# Patient Record
Sex: MALE | Race: WHITE | Employment: OTHER | URBAN - NONMETROPOLITAN AREA
[De-identification: names, ages, dates, MRNs, and addresses within clinical notes are randomized per-mention and may not be internally consistent; named-entity substitution may affect disease eponyms.]

---

## 2017-01-13 DIAGNOSIS — Z76.0 MEDICATION REFILL: Primary | ICD-10-CM

## 2017-01-13 RX ORDER — OMEPRAZOLE 40 MG/1
40 CAPSULE, DELAYED RELEASE ORAL 2 TIMES DAILY
Qty: 180 CAPSULE | Refills: 3 | Status: SHIPPED | OUTPATIENT
Start: 2017-01-13 | End: 2018-04-18 | Stop reason: SDUPTHER

## 2017-01-13 RX ORDER — TIZANIDINE 4 MG/1
4 TABLET ORAL 2 TIMES DAILY
Qty: 180 TABLET | Refills: 3 | Status: SHIPPED | OUTPATIENT
Start: 2017-01-13 | End: 2018-06-12 | Stop reason: SDUPTHER

## 2017-02-16 ENCOUNTER — OFFICE VISIT (OUTPATIENT)
Dept: PRIMARY CARE CLINIC | Age: 57
End: 2017-02-16
Payer: COMMERCIAL

## 2017-02-16 VITALS
OXYGEN SATURATION: 98 % | BODY MASS INDEX: 33.03 KG/M2 | SYSTOLIC BLOOD PRESSURE: 130 MMHG | DIASTOLIC BLOOD PRESSURE: 86 MMHG | HEIGHT: 69 IN | HEART RATE: 72 BPM | RESPIRATION RATE: 16 BRPM | WEIGHT: 223 LBS

## 2017-02-16 DIAGNOSIS — J40 BRONCHITIS: Primary | ICD-10-CM

## 2017-02-16 PROCEDURE — 99213 OFFICE O/P EST LOW 20 MIN: CPT | Performed by: FAMILY MEDICINE

## 2017-02-16 PROCEDURE — 96372 THER/PROPH/DIAG INJ SC/IM: CPT | Performed by: FAMILY MEDICINE

## 2017-02-16 RX ORDER — ALBUTEROL SULFATE 0.63 MG/3ML
1 SOLUTION RESPIRATORY (INHALATION) EVERY 6 HOURS PRN
COMMUNITY
End: 2017-02-16 | Stop reason: ALTCHOICE

## 2017-02-16 RX ORDER — GABAPENTIN 600 MG/1
600 TABLET ORAL 3 TIMES DAILY
Qty: 270 TABLET | Refills: 0 | Status: CANCELLED | OUTPATIENT
Start: 2017-02-16

## 2017-02-16 RX ORDER — PREDNISONE 20 MG/1
40 TABLET ORAL DAILY
Qty: 10 TABLET | Refills: 0 | Status: SHIPPED | OUTPATIENT
Start: 2017-02-16 | End: 2017-02-21

## 2017-02-16 RX ORDER — METHYLPREDNISOLONE SODIUM SUCCINATE 40 MG/ML
40 INJECTION, POWDER, LYOPHILIZED, FOR SOLUTION INTRAMUSCULAR; INTRAVENOUS ONCE
Status: COMPLETED | OUTPATIENT
Start: 2017-02-16 | End: 2017-02-16

## 2017-02-16 RX ORDER — IPRATROPIUM BROMIDE AND ALBUTEROL SULFATE 2.5; .5 MG/3ML; MG/3ML
1 SOLUTION RESPIRATORY (INHALATION) ONCE
Status: COMPLETED | OUTPATIENT
Start: 2017-02-16 | End: 2017-02-16

## 2017-02-16 RX ORDER — ALBUTEROL SULFATE 90 UG/1
2 AEROSOL, METERED RESPIRATORY (INHALATION) EVERY 4 HOURS PRN
Qty: 1 INHALER | Refills: 3 | Status: SHIPPED | OUTPATIENT
Start: 2017-02-16 | End: 2018-03-15

## 2017-02-16 RX ORDER — DOXYCYCLINE HYCLATE 100 MG
100 TABLET ORAL 2 TIMES DAILY
Qty: 20 TABLET | Refills: 0 | Status: SHIPPED | OUTPATIENT
Start: 2017-02-16 | End: 2017-02-26

## 2017-02-16 RX ADMIN — IPRATROPIUM BROMIDE AND ALBUTEROL SULFATE 1 AMPULE: 2.5; .5 SOLUTION RESPIRATORY (INHALATION) at 16:26

## 2017-02-16 RX ADMIN — METHYLPREDNISOLONE SODIUM SUCCINATE 40 MG: 40 INJECTION, POWDER, LYOPHILIZED, FOR SOLUTION INTRAMUSCULAR; INTRAVENOUS at 16:24

## 2017-02-16 ASSESSMENT — ENCOUNTER SYMPTOMS
COUGH: 1
SORE THROAT: 1
SHORTNESS OF BREATH: 1
WHEEZING: 1

## 2017-03-01 ENCOUNTER — OFFICE VISIT (OUTPATIENT)
Dept: PRIMARY CARE CLINIC | Age: 57
End: 2017-03-01
Payer: COMMERCIAL

## 2017-03-01 VITALS
DIASTOLIC BLOOD PRESSURE: 80 MMHG | WEIGHT: 223.38 LBS | HEIGHT: 69 IN | RESPIRATION RATE: 16 BRPM | SYSTOLIC BLOOD PRESSURE: 142 MMHG | OXYGEN SATURATION: 92 % | BODY MASS INDEX: 33.08 KG/M2 | HEART RATE: 92 BPM

## 2017-03-01 DIAGNOSIS — B37.0 THRUSH, ORAL: ICD-10-CM

## 2017-03-01 DIAGNOSIS — L50.9 HIVES: Primary | ICD-10-CM

## 2017-03-01 PROCEDURE — 99213 OFFICE O/P EST LOW 20 MIN: CPT | Performed by: FAMILY MEDICINE

## 2017-03-01 RX ORDER — MONTELUKAST SODIUM 10 MG/1
10 TABLET ORAL DAILY
Qty: 30 TABLET | Refills: 3 | Status: SHIPPED | OUTPATIENT
Start: 2017-03-01 | End: 2017-07-03 | Stop reason: SDUPTHER

## 2017-03-01 ASSESSMENT — ENCOUNTER SYMPTOMS
COUGH: 0
SINUS PRESSURE: 0
SHORTNESS OF BREATH: 0

## 2017-03-14 DIAGNOSIS — B37.0 THRUSH, ORAL: ICD-10-CM

## 2017-05-05 ENCOUNTER — EMPLOYEE WELLNESS (OUTPATIENT)
Dept: OTHER | Age: 57
End: 2017-05-05

## 2017-05-05 LAB
CHOLESTEROL, TOTAL: 168 MG/DL (ref 160–199)
GLUCOSE BLD-MCNC: 84 MG/DL (ref 74–109)
HDLC SERPL-MCNC: 38 MG/DL (ref 55–121)
LDL CHOLESTEROL CALCULATED: 101 MG/DL
TRIGL SERPL-MCNC: 145 MG/DL (ref 150–199)

## 2017-07-03 ENCOUNTER — OFFICE VISIT (OUTPATIENT)
Dept: PRIMARY CARE CLINIC | Age: 57
End: 2017-07-03
Payer: COMMERCIAL

## 2017-07-03 VITALS
BODY MASS INDEX: 32.44 KG/M2 | HEART RATE: 72 BPM | DIASTOLIC BLOOD PRESSURE: 80 MMHG | HEIGHT: 69 IN | SYSTOLIC BLOOD PRESSURE: 150 MMHG | TEMPERATURE: 98.6 F | WEIGHT: 219 LBS | OXYGEN SATURATION: 98 %

## 2017-07-03 DIAGNOSIS — M54.12 CERVICAL RADICULOPATHY: ICD-10-CM

## 2017-07-03 DIAGNOSIS — M50.30 DDD (DEGENERATIVE DISC DISEASE), CERVICAL: ICD-10-CM

## 2017-07-03 DIAGNOSIS — L50.9 HIVES: ICD-10-CM

## 2017-07-03 DIAGNOSIS — M51.36 DDD (DEGENERATIVE DISC DISEASE), LUMBAR: ICD-10-CM

## 2017-07-03 DIAGNOSIS — I10 ESSENTIAL HYPERTENSION: Primary | ICD-10-CM

## 2017-07-03 PROCEDURE — 99214 OFFICE O/P EST MOD 30 MIN: CPT | Performed by: FAMILY MEDICINE

## 2017-07-03 RX ORDER — GABAPENTIN 600 MG/1
600 TABLET ORAL 3 TIMES DAILY
Qty: 270 TABLET | Refills: 0 | Status: SHIPPED | OUTPATIENT
Start: 2017-07-03 | End: 2018-02-20 | Stop reason: SDUPTHER

## 2017-07-03 RX ORDER — MONTELUKAST SODIUM 10 MG/1
10 TABLET ORAL DAILY
Qty: 90 TABLET | Refills: 3 | Status: SHIPPED | OUTPATIENT
Start: 2017-07-03 | End: 2018-07-18 | Stop reason: SDUPTHER

## 2017-07-03 RX ORDER — GABAPENTIN 600 MG/1
TABLET ORAL
Qty: 90 TABLET | Refills: 0 | OUTPATIENT
Start: 2017-07-03

## 2017-07-03 RX ORDER — LISINOPRIL 10 MG/1
10 TABLET ORAL DAILY
Qty: 90 TABLET | Refills: 3 | Status: SHIPPED | OUTPATIENT
Start: 2017-07-03 | End: 2018-03-15

## 2017-07-03 ASSESSMENT — ENCOUNTER SYMPTOMS
COUGH: 0
BACK PAIN: 1
SHORTNESS OF BREATH: 0

## 2017-08-03 RX ORDER — LISINOPRIL 10 MG/1
10 TABLET ORAL DAILY
Qty: 30 TABLET | Refills: 11 | Status: SHIPPED | OUTPATIENT
Start: 2017-08-03 | End: 2018-08-09 | Stop reason: SDUPTHER

## 2017-12-07 ENCOUNTER — OFFICE VISIT (OUTPATIENT)
Dept: PRIMARY CARE CLINIC | Age: 57
End: 2017-12-07
Payer: COMMERCIAL

## 2017-12-07 DIAGNOSIS — Z23 FLU VACCINE NEED: Primary | ICD-10-CM

## 2017-12-07 PROCEDURE — 90471 IMMUNIZATION ADMIN: CPT | Performed by: FAMILY MEDICINE

## 2017-12-07 PROCEDURE — 90688 IIV4 VACCINE SPLT 0.5 ML IM: CPT | Performed by: FAMILY MEDICINE

## 2017-12-07 NOTE — PROGRESS NOTES
After obtaining consent, and per orders of Dr. Jennifer De La Torre, injection of Flu given in Left deltoid by Toya Marino. Patient instructed to remain in clinic for 20 minutes afterwards, and to report any adverse reaction to me immediately.

## 2017-12-20 ENCOUNTER — OFFICE VISIT (OUTPATIENT)
Dept: PRIMARY CARE CLINIC | Age: 57
End: 2017-12-20
Payer: COMMERCIAL

## 2017-12-20 VITALS
HEIGHT: 69 IN | OXYGEN SATURATION: 95 % | WEIGHT: 219 LBS | BODY MASS INDEX: 32.44 KG/M2 | TEMPERATURE: 97.1 F | DIASTOLIC BLOOD PRESSURE: 84 MMHG | SYSTOLIC BLOOD PRESSURE: 134 MMHG | HEART RATE: 78 BPM

## 2017-12-20 DIAGNOSIS — B37.81 THRUSH OF MOUTH AND ESOPHAGUS (HCC): ICD-10-CM

## 2017-12-20 DIAGNOSIS — B37.0 THRUSH OF MOUTH AND ESOPHAGUS (HCC): ICD-10-CM

## 2017-12-20 DIAGNOSIS — R50.9 FEVER, UNSPECIFIED FEVER CAUSE: Primary | ICD-10-CM

## 2017-12-20 DIAGNOSIS — A49.9 BACTERIAL INFECTION: ICD-10-CM

## 2017-12-20 LAB
INFLUENZA A ANTIBODY: NORMAL
INFLUENZA B ANTIBODY: NORMAL

## 2017-12-20 PROCEDURE — 87804 INFLUENZA ASSAY W/OPTIC: CPT | Performed by: NURSE PRACTITIONER

## 2017-12-20 PROCEDURE — 99214 OFFICE O/P EST MOD 30 MIN: CPT | Performed by: NURSE PRACTITIONER

## 2017-12-20 RX ORDER — AMOXICILLIN AND CLAVULANATE POTASSIUM 875; 125 MG/1; MG/1
1 TABLET, FILM COATED ORAL 2 TIMES DAILY
Qty: 20 TABLET | Refills: 0 | Status: SHIPPED | OUTPATIENT
Start: 2017-12-20 | End: 2017-12-30

## 2017-12-20 ASSESSMENT — ENCOUNTER SYMPTOMS
EYES NEGATIVE: 1
SORE THROAT: 1
COUGH: 1
GASTROINTESTINAL NEGATIVE: 1

## 2017-12-26 ENCOUNTER — OFFICE VISIT (OUTPATIENT)
Dept: PRIMARY CARE CLINIC | Age: 57
End: 2017-12-26
Payer: COMMERCIAL

## 2017-12-26 VITALS
WEIGHT: 214 LBS | BODY MASS INDEX: 31.7 KG/M2 | HEIGHT: 69 IN | HEART RATE: 64 BPM | DIASTOLIC BLOOD PRESSURE: 78 MMHG | SYSTOLIC BLOOD PRESSURE: 134 MMHG | OXYGEN SATURATION: 98 % | TEMPERATURE: 97.2 F

## 2017-12-26 DIAGNOSIS — K59.1 FUNCTIONAL DIARRHEA: Primary | ICD-10-CM

## 2017-12-26 DIAGNOSIS — K59.1 FUNCTIONAL DIARRHEA: ICD-10-CM

## 2017-12-26 LAB
ALBUMIN SERPL-MCNC: 4.6 G/DL (ref 3.5–5.2)
ALP BLD-CCNC: 140 U/L (ref 40–130)
ALT SERPL-CCNC: 57 U/L (ref 5–41)
ANION GAP SERPL CALCULATED.3IONS-SCNC: 14 MMOL/L (ref 7–19)
AST SERPL-CCNC: 39 U/L (ref 5–40)
BILIRUB SERPL-MCNC: 0.9 MG/DL (ref 0.2–1.2)
BUN BLDV-MCNC: 17 MG/DL (ref 6–20)
CALCIUM SERPL-MCNC: 9.4 MG/DL (ref 8.6–10)
CHLORIDE BLD-SCNC: 97 MMOL/L (ref 98–111)
CO2: 29 MMOL/L (ref 22–29)
CREAT SERPL-MCNC: 1 MG/DL (ref 0.5–1.2)
GFR NON-AFRICAN AMERICAN: >60
GLUCOSE BLD-MCNC: 93 MG/DL (ref 74–109)
HCT VFR BLD CALC: 51.4 % (ref 42–52)
HEMOGLOBIN: 17.8 G/DL (ref 14–18)
MCH RBC QN AUTO: 32 PG (ref 27–31)
MCHC RBC AUTO-ENTMCNC: 34.6 G/DL (ref 33–37)
MCV RBC AUTO: 92.3 FL (ref 80–94)
PDW BLD-RTO: 12.1 % (ref 11.5–14.5)
PLATELET # BLD: 324 K/UL (ref 130–400)
PMV BLD AUTO: 9.3 FL (ref 9.4–12.4)
POTASSIUM SERPL-SCNC: 4.3 MMOL/L (ref 3.5–5)
RBC # BLD: 5.57 M/UL (ref 4.7–6.1)
SODIUM BLD-SCNC: 140 MMOL/L (ref 136–145)
TOTAL PROTEIN: 7.5 G/DL (ref 6.6–8.7)
WBC # BLD: 7.1 K/UL (ref 4.8–10.8)

## 2017-12-26 PROCEDURE — 99213 OFFICE O/P EST LOW 20 MIN: CPT | Performed by: NURSE PRACTITIONER

## 2017-12-26 RX ORDER — CYCLOBENZAPRINE HCL 10 MG
10 TABLET ORAL 3 TIMES DAILY PRN
COMMUNITY
End: 2018-06-21

## 2017-12-26 RX ORDER — DICYCLOMINE HYDROCHLORIDE 10 MG/1
10 CAPSULE ORAL 3 TIMES DAILY PRN
Qty: 120 CAPSULE | Refills: 3 | Status: SHIPPED | OUTPATIENT
Start: 2017-12-26 | End: 2021-03-16 | Stop reason: ALTCHOICE

## 2017-12-26 RX ORDER — CIPROFLOXACIN 500 MG/1
500 TABLET, FILM COATED ORAL 2 TIMES DAILY
Qty: 14 TABLET | Refills: 0 | Status: SHIPPED | OUTPATIENT
Start: 2017-12-26 | End: 2018-01-02

## 2017-12-26 RX ORDER — METRONIDAZOLE 500 MG/1
500 TABLET ORAL 2 TIMES DAILY
Qty: 20 TABLET | Refills: 0 | Status: CANCELLED | OUTPATIENT
Start: 2017-12-26 | End: 2018-01-05

## 2017-12-26 ASSESSMENT — ENCOUNTER SYMPTOMS
NAUSEA: 0
EYES NEGATIVE: 1
ABDOMINAL PAIN: 1
RESPIRATORY NEGATIVE: 1
DIARRHEA: 1
VOMITING: 0

## 2017-12-26 NOTE — PROGRESS NOTES
[facility] PRIMARY CARE  [facility]  [address] [place] [address]  Dept: [phone]  Dept Fax: [phone]  Loc: [phone]    Thong Bauer is a 65 y.o. male who presents today for his medical conditions/complaints as noted below. Thong Bauer is c/o of Diarrhea (x 1.5 wks)      Chief Complaint   Patient presents with    Diarrhea     x 1.5 wks       HPI:     HPI   Patient here with complaints of diarrhea. He reports that symptoms have been ongoing for 1.5 weeks now. He was seen in our office by myself last week and diagnosed with gastroenteritis and informed to return in symptoms continue. He states that he has not had any relief from the symptoms yet.      Past Medical History:   Diagnosis Date    Backache, unspecified     Artis's esophagus     Bleeds easily (HCC)     Diseases of lips     Esophageal reflux     Glossitis     Hematospermia     Hoarseness     Insomnia, unspecified     Mononeuritis of unspecified site     Neck pain     Neuropathy (HCC)     nate feet    Other abnormal clinical finding     Other chronic pain     Other malaise and fatigue     Other specified disorders of breast     Sore throat     Unspecified essential hypertension     Urinary tract infection, site not specified         Past Surgical History:   Procedure Laterality Date    APPENDECTOMY      CHOLECYSTECTOMY      COLONOSCOPY      Dr Connor Duron @ [facility]-normal per patient    COLONOSCOPY  3-7-14    Dr Edda Primrose Dr Recardo Pals @ [facility]     UPPER GASTROINTESTINAL ENDOSCOPY  2-19-14    Dr Merari Sosa History   Substance Use Topics    Smoking status: Never Smoker    Smokeless tobacco: Never Used    Alcohol use No        Current Outpatient Prescriptions   Medication Sig Dispense Refill    ciprofloxacin (CIPRO) 500 MG tablet Take 1 tablet by mouth 2 times daily for 7 days 14 tablet 0    dicyclomine (BENTYL) 10 MG capsule Take 1 capsule by mouth 3 times daily as needed (cramping) 120 capsule 3    nystatin (MYCOSTATIN) 072251 UNIT/ML suspension Take 5 mLs by mouth 4 times daily 60 mL 1    amoxicillin-clavulanate (AUGMENTIN) 875-125 MG per tablet Take 1 tablet by mouth 2 times daily for 10 days 20 tablet 0    lisinopril (PRINIVIL;ZESTRIL) 10 MG tablet Take 1 tablet by mouth daily 30 tablet 11    gabapentin (NEURONTIN) 600 MG tablet Take 1 tablet by mouth 3 times daily 270 tablet 0    montelukast (SINGULAIR) 10 MG tablet Take 1 tablet by mouth daily 90 tablet 3    lisinopril (PRINIVIL;ZESTRIL) 10 MG tablet Take 1 tablet by mouth daily 90 tablet 3    albuterol sulfate HFA (PROAIR HFA) 108 (90 BASE) MCG/ACT inhaler Inhale 2 puffs into the lungs every 4 hours as needed for Wheezing or Shortness of Breath 1 Inhaler 3    omeprazole (PRILOSEC) 40 MG delayed release capsule Take 1 capsule by mouth 2 times daily 180 capsule 3    tiZANidine (ZANAFLEX) 4 MG tablet Take 1 tablet by mouth 2 times daily 180 tablet 3    DiphenhydrAMINE HCl (BENADRYL ALLERGY PO) Take by mouth      Fexofenadine HCl (ALLEGRA PO) Take by mouth       No current facility-administered medications for this visit. Allergies   Allergen Reactions    Iodine Rash     Skin peels, feet and hands turn red       Family History   Problem Relation Age of Onset    Diabetes Mother     Diabetes Father     Heart Disease Father     Crohn's Disease Daughter     Crohn's Disease Other     Colon Cancer Neg Hx     Colon Polyps Neg Hx     Liver Cancer Neg Hx     Stomach Cancer Neg Hx     Rectal Cancer Neg Hx          Subjective:      Review of Systems   HENT: Negative. Eyes: Negative. Respiratory: Negative. Cardiovascular: Negative. Gastrointestinal: Positive for abdominal pain and diarrhea. Negative for nausea and vomiting. Endocrine: Negative. Genitourinary: Negative. Musculoskeletal: Negative. Skin: Negative.     Neurological: Positive for capsule       No results found for this visit on 12/26/17. Plan:     I am treating diarrhea with abx due to longevity of the symptoms. I am sending swab to [facility]. Patient to have labs drawn today - we will call him with these results once obtained. Return if symptoms worsen or fail to improve. Orders Placed This Encounter   Procedures    CBC     Standing Status:   Future     Standing Expiration Date:   12/26/2018    Comprehensive Metabolic Panel     Standing Status:   Future     Standing Expiration Date:   12/26/2018       Orders Placed This Encounter   Medications    ciprofloxacin (CIPRO) 500 MG tablet     Sig: Take 1 tablet by mouth 2 times daily for 7 days     Dispense:  14 tablet     Refill:  0    dicyclomine (BENTYL) 10 MG capsule     Sig: Take 1 capsule by mouth 3 times daily as needed (cramping)     Dispense:  120 capsule     Refill:  3        Patient given educational materials - see patient instructions. Discussed use, benefit, and side effects of prescribed medications. All patient questions answered. Pt voiced understanding. Reviewed health maintenance. Instructed to continue current medications, diet and exercise. Patient agreed with treatment plan. Follow up as directed.            Electronically signed by MARTIN Leon on 12/26/2017 at 1:48 PM

## 2017-12-27 ENCOUNTER — TELEPHONE (OUTPATIENT)
Dept: PRIMARY CARE CLINIC | Age: 57
End: 2017-12-27

## 2017-12-27 NOTE — TELEPHONE ENCOUNTER
----- Message from MARTIN Smith sent at 12/26/2017  5:20 PM CST -----  Please let patient know that he does not appear to be dehydrated per his labs.  Thanks

## 2017-12-28 ENCOUNTER — TELEPHONE (OUTPATIENT)
Dept: PRIMARY CARE CLINIC | Age: 57
End: 2017-12-28

## 2018-02-20 DIAGNOSIS — M54.12 CERVICAL RADICULOPATHY: ICD-10-CM

## 2018-02-20 NOTE — TELEPHONE ENCOUNTER
patient called left message with request for refill on gabapentin. Last office visit 12-26 with next scheduled appointment not scheduled  Dose verified.    Last UDS  unknown    Last fill per 7-3

## 2018-02-21 RX ORDER — GABAPENTIN 600 MG/1
600 TABLET ORAL 3 TIMES DAILY
Qty: 270 TABLET | Refills: 1 | Status: SHIPPED | OUTPATIENT
Start: 2018-02-21 | End: 2018-06-21

## 2018-03-15 ENCOUNTER — OFFICE VISIT (OUTPATIENT)
Dept: PRIMARY CARE CLINIC | Age: 58
End: 2018-03-15
Payer: COMMERCIAL

## 2018-03-15 VITALS
HEART RATE: 70 BPM | OXYGEN SATURATION: 97 % | WEIGHT: 216.2 LBS | BODY MASS INDEX: 32.02 KG/M2 | TEMPERATURE: 97 F | DIASTOLIC BLOOD PRESSURE: 76 MMHG | SYSTOLIC BLOOD PRESSURE: 136 MMHG | HEIGHT: 69 IN

## 2018-03-15 DIAGNOSIS — B37.0 ORAL THRUSH: Primary | ICD-10-CM

## 2018-03-15 DIAGNOSIS — J02.9 PHARYNGITIS, UNSPECIFIED ETIOLOGY: ICD-10-CM

## 2018-03-15 PROCEDURE — 99214 OFFICE O/P EST MOD 30 MIN: CPT | Performed by: NURSE PRACTITIONER

## 2018-03-15 RX ORDER — FLUCONAZOLE 150 MG/1
TABLET ORAL
Qty: 3 TABLET | Refills: 0 | Status: SHIPPED | OUTPATIENT
Start: 2018-03-15 | End: 2018-06-21

## 2018-03-15 ASSESSMENT — PATIENT HEALTH QUESTIONNAIRE - PHQ9
2. FEELING DOWN, DEPRESSED OR HOPELESS: 0
1. LITTLE INTEREST OR PLEASURE IN DOING THINGS: 0
SUM OF ALL RESPONSES TO PHQ9 QUESTIONS 1 & 2: 0
SUM OF ALL RESPONSES TO PHQ QUESTIONS 1-9: 0

## 2018-03-15 ASSESSMENT — ENCOUNTER SYMPTOMS
RESPIRATORY NEGATIVE: 1
EYES NEGATIVE: 1
GASTROINTESTINAL NEGATIVE: 1
SORE THROAT: 1

## 2018-03-15 NOTE — PROGRESS NOTES
normal. He appears well-developed and well-nourished. HENT:   Head: Normocephalic and atraumatic. Right Ear: Hearing, tympanic membrane, external ear and ear canal normal.   Left Ear: Hearing, tympanic membrane, external ear and ear canal normal.   Nose: Nose normal.   Mouth/Throat: Uvula is midline and mucous membranes are normal. Posterior oropharyngeal erythema present. Eyes: Conjunctivae, EOM and lids are normal. Pupils are equal, round, and reactive to light. Neck: Trachea normal and normal range of motion. Neck supple. No thyroid mass and no thyromegaly present. Cardiovascular: Normal rate, regular rhythm, normal heart sounds and normal pulses. Pulmonary/Chest: Effort normal and breath sounds normal.   Abdominal: Soft. Normal appearance and bowel sounds are normal.   Musculoskeletal: Normal range of motion. Cervical back: Normal. He exhibits normal range of motion and no tenderness. Thoracic back: Normal. He exhibits normal range of motion and no tenderness. Lumbar back: Normal. He exhibits normal range of motion and no tenderness. Neurological: He is alert and oriented to person, place, and time. He has normal strength. Skin: Skin is warm, dry and intact. Psychiatric: He has a normal mood and affect. His speech is normal and behavior is normal. Judgment and thought content normal. Cognition and memory are normal.   Nursing note and vitals reviewed. /76   Pulse 70   Temp 97 °F (36.1 °C)   Ht 5' 9\" (1.753 m)   Wt 216 lb 3.2 oz (98.1 kg)   SpO2 97%   BMI 31.93 kg/m²     Assessment:     1. Oral thrush  Magic Mouthwash (MIRACLE MOUTHWASH)    fluconazole (DIFLUCAN) 150 MG tablet   2. Pharyngitis, unspecified etiology         No results found for this visit on 03/15/18. Plan:     I am sending in magic mouthwash for patient to try. He is to swallow solution since he has developed pain in his throat as well.   I am also sending in diflucan since this has

## 2018-03-20 VITALS — BODY MASS INDEX: 32.49 KG/M2 | WEIGHT: 220 LBS

## 2018-04-18 DIAGNOSIS — Z76.0 MEDICATION REFILL: ICD-10-CM

## 2018-04-18 RX ORDER — OMEPRAZOLE 40 MG/1
40 CAPSULE, DELAYED RELEASE ORAL 2 TIMES DAILY
Qty: 180 CAPSULE | Refills: 3 | Status: SHIPPED | OUTPATIENT
Start: 2018-04-18 | End: 2019-09-30 | Stop reason: SDUPTHER

## 2018-04-21 ENCOUNTER — EMPLOYEE WELLNESS (OUTPATIENT)
Dept: INTERNAL MEDICINE CLINIC | Age: 58
End: 2018-04-21

## 2018-04-21 LAB
CHOLESTEROL, TOTAL: 177 MG/DL (ref 160–199)
GLUCOSE BLD-MCNC: 88 MG/DL (ref 74–109)
HDLC SERPL-MCNC: 39 MG/DL (ref 55–121)
LDL CHOLESTEROL CALCULATED: 112 MG/DL
TRIGL SERPL-MCNC: 129 MG/DL (ref 0–149)

## 2018-05-02 VITALS — WEIGHT: 213 LBS | BODY MASS INDEX: 31.45 KG/M2

## 2018-06-12 DIAGNOSIS — G89.4 CHRONIC PAIN SYNDROME: ICD-10-CM

## 2018-06-13 RX ORDER — TIZANIDINE 4 MG/1
4 TABLET ORAL 2 TIMES DAILY
Qty: 180 TABLET | Refills: 3 | Status: SHIPPED | OUTPATIENT
Start: 2018-06-13 | End: 2018-06-21

## 2018-06-21 ENCOUNTER — OFFICE VISIT (OUTPATIENT)
Dept: PRIMARY CARE CLINIC | Age: 58
End: 2018-06-21
Payer: COMMERCIAL

## 2018-06-21 VITALS
DIASTOLIC BLOOD PRESSURE: 72 MMHG | TEMPERATURE: 98.3 F | OXYGEN SATURATION: 96 % | BODY MASS INDEX: 31.99 KG/M2 | HEIGHT: 69 IN | HEART RATE: 75 BPM | SYSTOLIC BLOOD PRESSURE: 124 MMHG | WEIGHT: 216 LBS

## 2018-06-21 DIAGNOSIS — B37.0 ORAL THRUSH: ICD-10-CM

## 2018-06-21 DIAGNOSIS — R25.2 SPASM: ICD-10-CM

## 2018-06-21 DIAGNOSIS — Z23 NEED FOR PROPHYLACTIC VACCINATION AND INOCULATION AGAINST VARICELLA: ICD-10-CM

## 2018-06-21 DIAGNOSIS — R25.2 SPASM: Primary | ICD-10-CM

## 2018-06-21 DIAGNOSIS — E55.9 VITAMIN D DEFICIENCY: ICD-10-CM

## 2018-06-21 LAB
ALBUMIN SERPL-MCNC: 4.3 G/DL (ref 3.5–5.2)
ALP BLD-CCNC: 107 U/L (ref 40–130)
ALT SERPL-CCNC: 26 U/L (ref 5–41)
ANION GAP SERPL CALCULATED.3IONS-SCNC: 15 MMOL/L (ref 7–19)
AST SERPL-CCNC: 31 U/L (ref 5–40)
BILIRUB SERPL-MCNC: 0.6 MG/DL (ref 0.2–1.2)
BUN BLDV-MCNC: 25 MG/DL (ref 6–20)
CALCIUM SERPL-MCNC: 9.2 MG/DL (ref 8.6–10)
CHLORIDE BLD-SCNC: 100 MMOL/L (ref 98–111)
CO2: 24 MMOL/L (ref 22–29)
CREAT SERPL-MCNC: 0.9 MG/DL (ref 0.5–1.2)
GFR NON-AFRICAN AMERICAN: >60
GLUCOSE BLD-MCNC: 86 MG/DL (ref 74–109)
HBA1C MFR BLD: 5 %
HCT VFR BLD CALC: 49 % (ref 42–52)
HEMOGLOBIN: 16.8 G/DL (ref 14–18)
MAGNESIUM: 2.2 MG/DL (ref 1.6–2.6)
MCH RBC QN AUTO: 31.9 PG (ref 27–31)
MCHC RBC AUTO-ENTMCNC: 34.3 G/DL (ref 33–37)
MCV RBC AUTO: 93 FL (ref 80–94)
PDW BLD-RTO: 12.2 % (ref 11.5–14.5)
PLATELET # BLD: 236 K/UL (ref 130–400)
PMV BLD AUTO: 10.5 FL (ref 9.4–12.4)
POTASSIUM SERPL-SCNC: 4 MMOL/L (ref 3.5–5)
RBC # BLD: 5.27 M/UL (ref 4.7–6.1)
SODIUM BLD-SCNC: 139 MMOL/L (ref 136–145)
TOTAL PROTEIN: 7.1 G/DL (ref 6.6–8.7)
TSH SERPL DL<=0.05 MIU/L-ACNC: 1.35 UIU/ML (ref 0.27–4.2)
VITAMIN B-12: 415 PG/ML (ref 211–946)
VITAMIN D 25-HYDROXY: 40.3 NG/ML
WBC # BLD: 8.3 K/UL (ref 4.8–10.8)

## 2018-06-21 PROCEDURE — 99214 OFFICE O/P EST MOD 30 MIN: CPT | Performed by: NURSE PRACTITIONER

## 2018-06-21 RX ORDER — CLOTRIMAZOLE 10 MG/1
10 LOZENGE ORAL; TOPICAL
Qty: 70 TABLET | Refills: 0 | Status: SHIPPED | OUTPATIENT
Start: 2018-06-21 | End: 2018-07-05

## 2018-06-21 RX ORDER — HYDROXYZINE HYDROCHLORIDE 25 MG/1
25 TABLET, FILM COATED ORAL 3 TIMES DAILY PRN
Qty: 30 TABLET | Refills: 1 | Status: SHIPPED | OUTPATIENT
Start: 2018-06-21 | End: 2018-07-01

## 2018-06-21 RX ORDER — TIZANIDINE 4 MG/1
4 TABLET ORAL EVERY 6 HOURS PRN
COMMUNITY
End: 2019-08-14 | Stop reason: SDUPTHER

## 2018-06-21 RX ORDER — GABAPENTIN 600 MG/1
600 TABLET ORAL 3 TIMES DAILY
COMMUNITY
End: 2019-05-02 | Stop reason: SDUPTHER

## 2018-06-21 ASSESSMENT — ENCOUNTER SYMPTOMS
EYES NEGATIVE: 1
RESPIRATORY NEGATIVE: 1
GASTROINTESTINAL NEGATIVE: 1

## 2018-06-22 ENCOUNTER — TELEPHONE (OUTPATIENT)
Dept: PRIMARY CARE CLINIC | Age: 58
End: 2018-06-22

## 2018-07-18 DIAGNOSIS — L50.9 HIVES: ICD-10-CM

## 2018-07-18 RX ORDER — MONTELUKAST SODIUM 10 MG/1
10 TABLET ORAL DAILY
Qty: 90 TABLET | Refills: 3 | Status: SHIPPED | OUTPATIENT
Start: 2018-07-18 | End: 2019-07-22 | Stop reason: SDUPTHER

## 2018-08-09 RX ORDER — LISINOPRIL 10 MG/1
10 TABLET ORAL DAILY
Qty: 30 TABLET | Refills: 11 | Status: SHIPPED | OUTPATIENT
Start: 2018-08-09 | End: 2019-07-22 | Stop reason: SDUPTHER

## 2019-04-05 ENCOUNTER — EMPLOYEE WELLNESS (OUTPATIENT)
Dept: OTHER | Age: 59
End: 2019-04-05

## 2019-04-05 LAB
CHOLESTEROL, TOTAL: 172 MG/DL (ref 160–199)
GLUCOSE BLD-MCNC: 90 MG/DL (ref 74–109)
HDLC SERPL-MCNC: 42 MG/DL (ref 55–121)
LDL CHOLESTEROL CALCULATED: 115 MG/DL
TRIGL SERPL-MCNC: 75 MG/DL (ref 0–149)

## 2019-04-15 VITALS — WEIGHT: 210 LBS | BODY MASS INDEX: 31.01 KG/M2

## 2019-05-02 ENCOUNTER — OFFICE VISIT (OUTPATIENT)
Dept: PRIMARY CARE CLINIC | Age: 59
End: 2019-05-02
Payer: COMMERCIAL

## 2019-05-02 VITALS
DIASTOLIC BLOOD PRESSURE: 74 MMHG | BODY MASS INDEX: 31.1 KG/M2 | OXYGEN SATURATION: 98 % | TEMPERATURE: 97.8 F | HEART RATE: 74 BPM | HEIGHT: 69 IN | SYSTOLIC BLOOD PRESSURE: 132 MMHG | WEIGHT: 210 LBS

## 2019-05-02 DIAGNOSIS — M54.16 LUMBAR RADICULAR PAIN: Primary | ICD-10-CM

## 2019-05-02 PROCEDURE — 99213 OFFICE O/P EST LOW 20 MIN: CPT | Performed by: NURSE PRACTITIONER

## 2019-05-02 RX ORDER — PREDNISONE 10 MG/1
TABLET ORAL
Qty: 42 TABLET | Refills: 0 | Status: SHIPPED | OUTPATIENT
Start: 2019-05-02 | End: 2019-07-03 | Stop reason: ALTCHOICE

## 2019-05-02 RX ORDER — GABAPENTIN 600 MG/1
600 TABLET ORAL 3 TIMES DAILY
Qty: 90 TABLET | Refills: 0 | Status: SHIPPED | OUTPATIENT
Start: 2019-05-02 | End: 2019-08-14 | Stop reason: SDUPTHER

## 2019-05-02 NOTE — PROGRESS NOTES
[address] [facility]   [address] [address]  Phone:  [phone]  Fax:  [phone]    History of Present Illness     Patient Identification  Hilario Conte is a 63 y.o. male. Chief Complaint   Back Pain (Lower back pain has been getting worse over the past month. Had an MRI in [place], [place] over 10 years ago and was told his back was covered with spurs); Leg Pain (Bilateral leg pain); and Oral Pain (tongue is sore all of the time and medication given at last appt made patient very sick)      Patient presents with complaint of back pain. This is a result of [facility] 2009. Onset of pain was several years ago and has been gradually improving since. The pain is located in left lower back, described as burning and throbbing and rated as severe, with radiation. Symptoms include leg pain. The patient denies weight loss, recent steroid use, abdominal swelling, perianal numbness. The patient denies other injuries. Care prior to arrival consisted of Neurontin with moderate relief. Patient is also concerned with continued thrush like symptoms. He has tried magic mouthwash for a few months without any relief from the thick white coat on his tongue. Treated with clotrimazole (MYCELEX) 10 MG jenni with no improvement.       Past Medical History:   Diagnosis Date    Backache, unspecified     Artis's esophagus     Bleeds easily (HCC)     Diseases of lips     Esophageal reflux     Glossitis     Hematospermia     Hoarseness     Insomnia, unspecified     Mononeuritis of unspecified site     Neck pain     Neuropathy     nate feet    Other abnormal clinical finding     Other chronic pain     Other malaise and fatigue     Other specified disorders of breast     Sore throat     Unspecified essential hypertension     Urinary tract infection, site not specified      Family History   Problem Relation Age of Onset    Diabetes Mother     Diabetes Father     Heart Disease Father     Crohn's Disease Daughter     Crohn's Disease Other     Colon Cancer Neg Hx     Colon Polyps Neg Hx     Liver Cancer Neg Hx     Stomach Cancer Neg Hx     Rectal Cancer Neg Hx      Current Outpatient Medications   Medication Sig Dispense Refill    lisinopril (PRINIVIL;ZESTRIL) 10 MG tablet Take 1 tablet by mouth daily 30 tablet 11    montelukast (SINGULAIR) 10 MG tablet Take 1 tablet by mouth daily 90 tablet 3    tiZANidine (ZANAFLEX) 4 MG tablet Take 4 mg by mouth every 6 hours as needed      gabapentin (NEURONTIN) 600 MG tablet Take 600 mg by mouth 3 times daily. Thurnell Royalty omeprazole (PRILOSEC) 40 MG delayed release capsule TAKE 1 CAPSULE BY MOUTH 2 TIMES DAILY 180 capsule 3    dicyclomine (BENTYL) 10 MG capsule Take 1 capsule by mouth 3 times daily as needed (cramping) 120 capsule 3    Fexofenadine HCl (ALLEGRA PO) Take by mouth       No current facility-administered medications for this visit.       Allergies   Allergen Reactions    Iodine Rash     Skin peels, feet and hands turn red     Social History     Socioeconomic History    Marital status:      Spouse name: Not on file    Number of children: Not on file    Years of education: Not on file    Highest education level: Not on file   Occupational History    Not on file   Social Needs    Financial resource strain: Not on file    Food insecurity:     Worry: Not on file     Inability: Not on file    Transportation needs:     Medical: Not on file     Non-medical: Not on file   Tobacco Use    Smoking status: Never Smoker    Smokeless tobacco: Never Used   Substance and Sexual Activity    Alcohol use: No    Drug use: No    Sexual activity: Not on file   Lifestyle    Physical activity:     Days per week: Not on file     Minutes per session: Not on file    Stress: Not on file   Relationships    Social connections:     Talks on phone: Not on file     Gets together: Not on file     Attends Scientologist service: Not on file     Active member of club or organization: Not on file     Attends meetings of clubs or organizations: Not on file     Relationship status: Not on file    Intimate partner violence:     Fear of current or ex partner: Not on file     Emotionally abused: Not on file     Physically abused: Not on file     Forced sexual activity: Not on file   Other Topics Concern    Not on file   Social History Narrative    Not on file     Review of Systems  Pertinent items are noted in HPI. Physical Exam     /74   Pulse 74   Temp 97.8 °F (36.6 °C)   Ht 5' 9\" (1.753 m)   Wt 210 lb (95.3 kg)   SpO2 98%   BMI 31.01 kg/m²   Physical Exam   Musculoskeletal:        Back:        Assessment and Plan     Yovani was seen today for back pain, leg pain and oral pain. Diagnoses and all orders for this visit:    Lumbar radicular pain  -     gabapentin (NEURONTIN) 600 MG tablet; Take 1 tablet by mouth 3 times daily for 30 days. -     Ambulatory referral to Physical Therapy  -     MRI LUMBAR SPINE WO CONTRAST; Future    Other orders  -     predniSONE (DELTASONE) 10 MG tablet; Take 6 tablets for 2 days, 5 tabs for 2 days, 4 tabs for 2 days, 3 tabs for 2 days, 2 tabs for 2 days, 1 tab for 2 days. Studies: MRI, [facility] to rule out oral thrush    Records Reviewed: Old medical records. Nursing notes. Treatments: Continue Neurontin, Prednisone dose pack, PT,MRI L/S spine.     Consultations: if symptoms worsen and MRI positive referral to Neurosurgery,    Disposition: Home Tylenol

## 2019-05-08 ENCOUNTER — TELEPHONE (OUTPATIENT)
Dept: PRIMARY CARE CLINIC | Age: 59
End: 2019-05-08

## 2019-05-08 RX ORDER — FLUCONAZOLE 100 MG/1
100 TABLET ORAL DAILY
Qty: 7 TABLET | Refills: 0 | Status: SHIPPED | OUTPATIENT
Start: 2019-05-08 | End: 2019-05-15

## 2019-05-20 DIAGNOSIS — B37.81 THRUSH OF MOUTH AND ESOPHAGUS (HCC): ICD-10-CM

## 2019-05-20 DIAGNOSIS — B37.0 THRUSH OF MOUTH AND ESOPHAGUS (HCC): ICD-10-CM

## 2019-05-20 DIAGNOSIS — K21.00 GASTROESOPHAGEAL REFLUX DISEASE WITH ESOPHAGITIS: Primary | ICD-10-CM

## 2019-05-29 ENCOUNTER — HOSPITAL ENCOUNTER (OUTPATIENT)
Dept: MRI IMAGING | Age: 59
Discharge: HOME | End: 2019-05-29
Payer: COMMERCIAL

## 2019-05-29 DIAGNOSIS — M54.16 LUMBAR RADICULAR PAIN: ICD-10-CM

## 2019-05-29 PROCEDURE — 72148 MRI LUMBAR SPINE W/O DYE: CPT

## 2019-06-06 ENCOUNTER — TELEPHONE (OUTPATIENT)
Dept: PRIMARY CARE CLINIC | Age: 59
End: 2019-06-06

## 2019-06-06 NOTE — TELEPHONE ENCOUNTER
Patient notified    Please notify patient of abnormal results.   [place] DISEASE BUT IT APPEARS TO BE STABLE, physical therapy would be most beneficial measure for him at this point. He is welcome to follow up with Dr. Luzma Lundy if symptoms worsen or persist. If the patient is willing to do physical therapy please initiate an order for [facility] physical therapy. Result Notes for MRI LUMBAR SPINE WO CONTRAST     Notes recorded by MARTIN Cervantes on 5/30/2019 at 10:30 AM CDT  Please notify patient of abnormal results.  [place] 6 DISEASE BUT IT APPEARS TO BE STABLE, physical therapy would be most beneficial measure for him at this point. He is welcome to follow up with Dr. Luzma Lundy if symptoms worsen or persist. If the patient is willing to do physical therapy please initiate an order for [facility] physical therapy. MRI LUMBAR SPINE WO CONTRAST   Order: [id number]   Status:  Final result   Visible to patient:  No (Not Released) Dx:  Lumbar radicular pain   Details     Reading Physician Reading Date Result Priority   Katiana Keita MD 5/29/2019       Narrative     EXAMINATION:  MRI LUMBAR SPINE WO CONTRAST  5/29/2019 4:47 PM  HISTORY: Chronic low back pain. COMPARISON: 3/1/2013. X-rays on 1/2/2013. TECHNIQUE: Multiplanar imaging was performed. FINDINGS: Antonio Dominguez is a transitional S1 vertebral body. There is a small  disc space at S1-S2. With this numbering system, the inferior tip of  the conus is at the T12-L-1 junction. At S1-S2, there is no spinal or foraminal narrowing. There is minimal  facet arthropathy. At L5-S1, there is minimal disc bulging. There is mild facet  arthropathy and mild thickening of ligamentum flavum. There is a left  foraminal disc osteophyte complex causing moderate to severe narrowing  of the left-sided foramen. There is mild right-sided foraminal  narrowing predominantly due to disc bulge. At L4-5, there is minimal disc bulge. There is mild facet arthropathy. There is minimal inferior recess foraminal narrowing due to disc  bulging. At L3-4, there is minimal disc bulge. There is mild facet arthropathy. There is minimal inferior recess foraminal narrowing due to disc  bulging. At L1-2 and L2-3, the disc spaces are well-maintained. There is no  disc herniation or significant disc bulging. There is no spinal or  foraminal stenosis. There are similar findings at T12-L1. Impression     Mild degenerative changes are noted. Please see the above  report. No significant change compared to the previous study.   Signed by Dr Ava Pickering on 5/29/2019 4:53 PM             Last Resulted: 05/29/19 16:53        Order Details       View Encounter       Lab and Collection Details       Routing       Result History

## 2019-06-06 NOTE — TELEPHONE ENCOUNTER
----- Message from MARTIN Dickens sent at 5/30/2019 10:30 AM CDT -----  Please notify patient of abnormal results. HE DOES HAVE SOME MILD DEGENERATIVE DISC DISEASE BUT IT APPEARS TO BE STABLE, physical therapy would be most beneficial measure for him at this point. He is welcome to follow up with Dr. Kj Verduzco if symptoms worsen or persist. If the patient is willing to do physical therapy please initiate an order for [facility] physical therapy.

## 2019-06-10 DIAGNOSIS — M54.16 LUMBAR RADICULAR PAIN: Primary | ICD-10-CM

## 2019-06-21 ENCOUNTER — TELEPHONE (OUTPATIENT)
Dept: NEUROSURGERY | Age: 59
End: 2019-06-21

## 2019-06-21 NOTE — TELEPHONE ENCOUNTER
Neurosurgical New Patient Questionnaire       Referring physician? Dr Torres Lynn for referral?         Lower back pain. Radiates to Bilat legs. Who is completing questionnaire? Patient      Has the patient had any previous spinal/brain surgeries? no      MRI images obtained? Yes, @ [facility]       What part of the body? Lumbar spine     When was it performed? May 2019    Has the patient had a NCV/EMG? Yes       If yes,  where was it performed? [facility]     When was it performed? 10 years ago    Physical Therapy?                       no       Pain Management?                        no     Is the patient currently taking anything for pain control? 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 LBP and it radiates towars legs dariel to toes. He says he has tingling in both legs. Neck pain and radiates to his left arm.

## 2019-06-28 ENCOUNTER — OFFICE VISIT (OUTPATIENT)
Dept: URGENT CARE | Age: 59
End: 2019-06-28
Payer: COMMERCIAL

## 2019-06-28 VITALS
SYSTOLIC BLOOD PRESSURE: 135 MMHG | HEIGHT: 69 IN | DIASTOLIC BLOOD PRESSURE: 81 MMHG | HEART RATE: 78 BPM | BODY MASS INDEX: 31.84 KG/M2 | TEMPERATURE: 98.6 F | RESPIRATION RATE: 16 BRPM | WEIGHT: 215 LBS | OXYGEN SATURATION: 98 %
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
## 2019-06-28 PROCEDURE — 99212 OFFICE O/P EST SF 10 MIN: CPT | Performed by: NURSE PRACTITIONER

## 2019-06-28 RX ORDER — DOXYCYCLINE 100 MG/1
100 CAPSULE ORAL 2 TIMES DAILY
Qty: 14 CAPSULE | Refills: 0 | Status: SHIPPED | OUTPATIENT
Start: 2019-06-28 | End: 2019-07-03 | Stop reason: ALTCHOICE

## 2019-06-28 ASSESSMENT — ENCOUNTER SYMPTOMS
SORE THROAT: 0
ABDOMINAL PAIN: 0
COUGH: 0
EYES NEGATIVE: 1
SHORTNESS OF BREATH: 0
SINUS PRESSURE: 0
ALLERGIC/IMMUNOLOGIC NEGATIVE: 1
BACK PAIN: 0

## 2019-06-28 NOTE — PROGRESS NOTES
Negative. Genitourinary: Negative for dysuria, frequency and urgency. Musculoskeletal: Negative for arthralgias, back pain and myalgias. Skin: Negative for rash. Tick bite right wrist   Allergic/Immunologic: Negative. Neurological: Negative for weakness and headaches. Hematological: Negative. Psychiatric/Behavioral: Negative.        :Objective      Physical Exam   Constitutional: He is oriented to person, place, and time. Vital signs are normal. He appears well-developed and well-nourished. He is active and cooperative. He is easily aroused. No distress. HENT:   Head: Normocephalic. Right Ear: External ear normal.   Left Ear: External ear normal.   Nose: Nose normal.   Mouth/Throat: Mucous membranes are normal.   Eyes: Pupils are equal, round, and reactive to light. Conjunctivae and lids are normal. Right eye exhibits no discharge. Left eye exhibits no discharge. Neck: Trachea normal, normal range of motion and full passive range of motion without pain. Neck supple. Cardiovascular: Normal rate, regular rhythm, S1 normal, S2 normal and normal heart sounds. Exam reveals no gallop and no friction rub. No murmur heard. Pulmonary/Chest: Effort normal and breath sounds normal. No respiratory distress. He has no wheezes. He has no rhonchi. He has no rales. He exhibits no tenderness. Abdominal: Soft. Normal appearance. Musculoskeletal: Normal range of motion. He exhibits no edema, tenderness or deformity. Lymphadenopathy:     He has no cervical adenopathy. Neurological: He is alert, oriented to person, place, and time and easily aroused. He has normal strength. No cranial nerve deficit or sensory deficit. Skin: Skin is warm, dry and intact. Capillary refill takes less than 2 seconds. Ecchymosis and lesion noted. No rash noted. There is erythema. No cyanosis. No pallor. Psychiatric: He has a normal mood and affect.  His speech is normal and behavior is normal.   Nursing note and vitals reviewed. /81   Pulse 78   Temp 98.6 °F (37 °C)   Resp 16   Ht 5' 9\" (1.753 m)   Wt 215 lb (97.5 kg)   SpO2 98%   BMI 31.75 kg/m²     :Assessment       Diagnosis Orders   1. Tick bite, initial encounter         :Plan    No orders of the defined types were placed in this encounter. Return if symptoms worsen or fail to improve. 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 They bite to fasten themselves onto your skin and feed on your blood. Ticks can carry diseases. But most ticks do not carry diseases, and most tick bites do not cause serious health problems. Some people may have an allergic reaction to a tick bite. This reaction may be mild, with symptoms like itching and swelling. In rare cases, a severe allergic reaction may occur. Most of the time, all you need to do for a tick bite is relieve any symptoms you may have. Follow-up care is a key part of your treatment and safety. Be sure to make and go to all appointments, and call your doctor if you are having problems.  It's also a good idea to know your test results and keep a list of the medicines you take. How can you care for yourself at home? · Put ice or a cold pack on the bite for 15 to 20 minutes once an hour. Put a thin cloth between the ice and your skin. · Try an over-the-counter medicine to relieve itching, redness, swelling, and pain. Be safe with medicines. Read and follow all instructions on the label. ? Take an antihistamine medicine, such as a nondrowsy one like loratadine (Claritin) or one that might make you sleepy like diphenhydramine (Benadryl). These medicines may help relieve itching, redness, and swelling. ? Use a spray of local anesthetic that contains benzocaine, such as Solarcaine. It may help relieve pain. If your skin reacts to the spray, stop using it. ? Put calamine lotion on the skin. It may help relieve itching. To avoid tick bites  · Avoid ticks:  ? Learn where ticks are found in your community, and stay away from those areas if possible. ? Cover as much of your body as possible when you work or play in grassy or wooded areas. ? Use insect repellents, such as products containing DEET. You can spray them on your skin. ? Take steps to control ticks on your property if you live in an area where Lyme disease occurs. Clear leaves, brush, tall grasses, woodpiles, and stone fences from around your house and the edges of your yard or garden. This may help get rid of ticks. · When you come in from outdoors, check your body for ticks, including your groin, head, and underarms. The ticks may be about the size of a sesame seed. If no one else can help you check for ticks on your scalp, comb your hair with a fine-tooth comb. · If you find a tick, remove it quickly. Use tweezers to grasp the tick as close to its mouth (the part in your skin) as possible. Slowly pull the tick straight out--do not twist or yank--until its mouth releases from your skin. If part of the tick stays in the skin, leave it alone.  It will likely come out on its own

## 2019-06-28 NOTE — PATIENT INSTRUCTIONS
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 They bite to fasten themselves onto your skin and feed on your blood. Ticks can carry diseases. But most ticks do not carry diseases, and most tick bites do not cause serious health problems. Some people may have an allergic reaction to a tick bite. This reaction may be mild, with symptoms like itching and swelling. In rare cases, a severe allergic reaction may occur. Most of the time, all you need to do for a tick bite is relieve any symptoms you may have. Follow-up care is a key part of your treatment and safety. Be sure to make and go to all appointments, and call your doctor if you are having problems. It's also a good idea to know your test results and keep a list of the medicines you take. How can you care for yourself at home? · Put ice or a cold pack on the bite for 15 to 20 minutes once an hour. Put a thin cloth between the ice and your skin. · Try an over-the-counter medicine to relieve itching, redness, swelling, and pain. Be safe with medicines. Read and follow all instructions on the label. ? Take an antihistamine medicine, such as a nondrowsy one like loratadine (Claritin) or one that might make you sleepy like diphenhydramine (Benadryl). These medicines may help relieve itching, redness, and swelling. ?  Use a spray of local anesthetic that contains benzocaine, such as Solarcaine. It may help relieve pain. If your skin reacts to the spray, stop using it. ? Put calamine lotion on the skin. It may help relieve itching. To avoid tick bites  · Avoid ticks:  ? Learn where ticks are found in your community, and stay away from those areas if possible. ? Cover as much of your body as possible when you work or play in grassy or wooded areas. ? Use insect repellents, such as products containing DEET. You can spray them on your skin. ? Take steps to control ticks on your property if you live in an area where Lyme disease occurs. Clear leaves, brush, tall grasses, woodpiles, and stone fences from around your house and the edges of your yard or garden. This may help get rid of ticks. · When you come in from outdoors, check your body for ticks, including your groin, head, and underarms. The ticks may be about the size of a sesame seed. If no one else can help you check for ticks on your scalp, comb your hair with a fine-tooth comb. · If you find a tick, remove it quickly. Use tweezers to grasp the tick as close to its mouth (the part in your skin) as possible. Slowly pull the tick straight out--do not twist or yank--until its mouth releases from your skin. If part of the tick stays in the skin, leave it alone. It will likely come out on its own in a few days. · Ticks can come into your house on clothing, outdoor gear, and pets. These ticks can fall off and attach to you. ? Check your clothing and outdoor gear. Remove any ticks you find. Then put your clothing in a clothes dryer on high heat for 1 hour to kill any ticks that might remain. ? Check your pets for ticks after they have been outdoors. · When hiking in the woods, carry a small dry jar or ziplock bag. If you find a tick on your body, remove the tick and put it in the jar or bag.  Store the container in the freezer so you can give it to your doctor if symptoms develop. The tick can be tested to learn whether it is carrying the bacteria that cause Lyme disease. When should you call for help? Call 911 anytime you think you may need emergency care. For example, call if:    · You have symptoms of a severe allergic reaction. These may include:  ? Sudden raised, red areas (hives) all over your body. ? Swelling of the throat, mouth, lips, or tongue. ? Trouble breathing. ? Passing out (losing consciousness). Or you may feel very lightheaded or suddenly feel weak, confused, or restless.    Call your doctor now or seek immediate medical care if:    · You have signs of infection, such as:  ? Increased pain, swelling, warmth, or redness around the bite. ? Red streaks leading from the bite. ? Pus draining from the bite. ? A fever.    Watch closely for changes in your health, and be sure to contact your doctor if:    · You develop a new rash.     · You have joint pain.     · You are very tired.     · You have flu-like symptoms.     · You have symptoms for more than 1 week. Where can you learn more? Go to [email or web address]://[email or web address]. org and sign in to your "[employer]" account. Enter W311 in the DRESSBOOM box to learn more about \"Tick Bite: Care Instructions. \"     If you do not have an account, please click on the \"Sign Up Now\" link. Current as of: September 23, 2018  Content Version: 12.0  © 6146-3444 [employer], [employer]. Care instructions adapted under license by [facility] ([facility]). If you have questions about a medical condition or this instruction, always ask your healthcare professional. [place] [address] any warranty or liability for your use of this information.

## 2019-07-03 ENCOUNTER — OFFICE VISIT (OUTPATIENT)
Dept: NEUROSURGERY | Age: 59
End: 2019-07-03
Payer: COMMERCIAL

## 2019-07-03 VITALS
DIASTOLIC BLOOD PRESSURE: 75 MMHG | WEIGHT: 217 LBS | HEART RATE: 88 BPM | BODY MASS INDEX: 32.14 KG/M2 | SYSTOLIC BLOOD PRESSURE: 138 MMHG | HEIGHT: 69 IN

## 2019-07-03 DIAGNOSIS — M79.10 MYALGIA: ICD-10-CM

## 2019-07-03 DIAGNOSIS — M79.10 MYALGIA: Primary | ICD-10-CM

## 2019-07-03 LAB
ALBUMIN SERPL-MCNC: 4.5 G/DL (ref 3.5–5.2)
ALP BLD-CCNC: 100 U/L (ref 40–130)
ALT SERPL-CCNC: 22 U/L (ref 5–41)
ANION GAP SERPL CALCULATED.3IONS-SCNC: 15 MMOL/L (ref 7–19)
AST SERPL-CCNC: 24 U/L (ref 5–40)
BASOPHILS ABSOLUTE: 0.1 K/UL (ref 0–0.2)
BASOPHILS RELATIVE PERCENT: 0.8 % (ref 0–1)
BILIRUB SERPL-MCNC: 0.6 MG/DL (ref 0.2–1.2)
BILIRUBIN DIRECT: 0.2 MG/DL (ref 0–0.3)
BILIRUBIN, INDIRECT: 0.4 MG/DL (ref 0.1–1)
BUN BLDV-MCNC: 27 MG/DL (ref 6–20)
C-REACTIVE PROTEIN: 0.06 MG/DL (ref 0–0.5)
CALCIUM SERPL-MCNC: 9.4 MG/DL (ref 8.6–10)
CHLORIDE BLD-SCNC: 102 MMOL/L (ref 98–111)
CO2: 25 MMOL/L (ref 22–29)
CREAT SERPL-MCNC: 1 MG/DL (ref 0.5–1.2)
EOSINOPHILS ABSOLUTE: 0.3 K/UL (ref 0–0.6)
EOSINOPHILS RELATIVE PERCENT: 3.1 % (ref 0–5)
FOLATE: 19 NG/ML (ref 4.5–32.2)
GFR NON-AFRICAN AMERICAN: >60
GLUCOSE BLD-MCNC: 123 MG/DL (ref 74–109)
HCT VFR BLD CALC: 48 % (ref 42–52)
HEMOGLOBIN: 16.4 G/DL (ref 14–18)
LYMPHOCYTES ABSOLUTE: 2 K/UL (ref 1.1–4.5)
LYMPHOCYTES RELATIVE PERCENT: 24.6 % (ref 20–40)
MAGNESIUM: 2.3 MG/DL (ref 1.6–2.6)
MCH RBC QN AUTO: 32.3 PG (ref 27–31)
MCHC RBC AUTO-ENTMCNC: 34.2 G/DL (ref 33–37)
MCV RBC AUTO: 94.5 FL (ref 80–94)
MONOCYTES ABSOLUTE: 0.6 K/UL (ref 0–0.9)
MONOCYTES RELATIVE PERCENT: 7.6 % (ref 0–10)
NEUTROPHILS ABSOLUTE: 5.1 K/UL (ref 1.5–7.5)
NEUTROPHILS RELATIVE PERCENT: 63.5 % (ref 50–65)
PDW BLD-RTO: 12.4 % (ref 11.5–14.5)
PHOSPHORUS: 3.2 MG/DL (ref 2.5–4.5)
PLATELET # BLD: 243 K/UL (ref 130–400)
PMV BLD AUTO: 10 FL (ref 9.4–12.4)
POTASSIUM SERPL-SCNC: 3.7 MMOL/L (ref 3.5–5)
RBC # BLD: 5.08 M/UL (ref 4.7–6.1)
SEDIMENTATION RATE, ERYTHROCYTE: 2 MM/HR (ref 0–15)
SODIUM BLD-SCNC: 142 MMOL/L (ref 136–145)
TOTAL PROTEIN: 6.9 G/DL (ref 6.6–8.7)
VITAMIN B-12: 466 PG/ML (ref 211–946)
WBC # BLD: 7.9 K/UL (ref 4.8–10.8)

## 2019-07-03 PROCEDURE — 99204 OFFICE O/P NEW MOD 45 MIN: CPT | Performed by: NEUROLOGICAL SURGERY

## 2019-07-03 ASSESSMENT — ENCOUNTER SYMPTOMS
HEARTBURN: 1
SORE THROAT: 1
BLURRED VISION: 1
SINUS PAIN: 1
COUGH: 1
BACK PAIN: 1

## 2019-07-03 NOTE — PROGRESS NOTES
no bony deformities, symmetric bulk  Extremities- no clubbing, cyanosis or edema  Skin - warm, dry, and intact. No rash,erythema, or pallor. Psychiatric - mood, affect, and behavior appear normal.       NEUROLOGIC EXAM:    MENTAL STATUS: Alert, oriented, thought content appropriate    CRANIAL NERVES: PERRL, EOMI, symmetric facies, tongue midline    MOTOR:     Right Upper Extremity:    Deltoid: 5/5  Biceps: 5/5  Triceps: 5/5  Wrist Extension: 5/5  Finger Abduction: 5/5  APB: 5/5    Left Upper Extremity:    Deltoid: 5/5  Biceps: 5/5  Triceps: 5/5  Wrist Extension: 5/5  Finger Abduction: 5/5  APB: 5/5    Right Lower Extremity:    Hip Flexion: 5/5  Knee Extension: 5/5  Ankle Plantarflexion: 5/5  Ankle Dorsiflexion: 5/5  EHL: 5/5      Left Lower Extremity:    Hip Flexion: 5/5  Knee Extension: 5/5  Ankle Plantarflexion: 5/5  Ankle Dorsiflexion: 5/5  EHL: 5/5      SOMATOSENSORY:     Right Upper Extremity: normal light touch sensation  Left Upper Extremity: normal light touch sensation  Right Lower Extremity: normal light touch sensation  Left Lower Extremity: normal light touch sensation      REFLEXES:    Biceps: 2+  Patella: 2+  Ankle Jerk: 2+    Garcia's: Negative      COORDINATION and GAIT:      Gait: Antalgic      IMAGING:    My interpretation of imaging studies: There is transitional lumbar anatomy with a prominent S1/2 disc. Minimal degenerative changes, no high-grade central, lateral recess or foraminal stenosis at any level in the lumbar spine. ASSESSMENT AND PLAN:  This is a 63 y.o. male who presents with chronic back pain, diffuse myalgias and intermittent radicular pain in his legs. There is no anatomic correlate to explain this on his MRI. Given this, I do not feel he is a candidate for surgical intervention. He does have many complaints that are concerning for either a primary neuropathy or rheumatologic disease. I have ordered bloodwork to help clarify this further.   Based on the results of

## 2019-07-06 LAB — LYME, EIA: 0.39 LIV (ref 0–1.2)

## 2019-07-22 DIAGNOSIS — L50.9 HIVES: ICD-10-CM

## 2019-07-22 RX ORDER — MONTELUKAST SODIUM 10 MG/1
10 TABLET ORAL DAILY
Qty: 90 TABLET | Refills: 3 | Status: SHIPPED | OUTPATIENT
Start: 2019-07-22 | End: 2020-06-18

## 2019-07-24 ENCOUNTER — OFFICE VISIT (OUTPATIENT)
Dept: GASTROENTEROLOGY | Age: 59
End: 2019-07-24
Payer: COMMERCIAL

## 2019-07-24 VITALS
SYSTOLIC BLOOD PRESSURE: 130 MMHG | HEIGHT: 69 IN | HEART RATE: 64 BPM | BODY MASS INDEX: 32.32 KG/M2 | DIASTOLIC BLOOD PRESSURE: 80 MMHG | OXYGEN SATURATION: 99 % | WEIGHT: 218.2 LBS

## 2019-07-24 DIAGNOSIS — Z86.010 HISTORY OF COLON POLYPS: ICD-10-CM

## 2019-07-24 DIAGNOSIS — K22.70 BARRETT'S ESOPHAGUS DETERMINED BY BIOPSY: ICD-10-CM

## 2019-07-24 DIAGNOSIS — R13.10 DYSPHAGIA, UNSPECIFIED TYPE: Primary | ICD-10-CM

## 2019-07-24 DIAGNOSIS — B37.0 THRUSH: ICD-10-CM

## 2019-07-24 DIAGNOSIS — R49.0 HOARSENESS: ICD-10-CM

## 2019-07-24 DIAGNOSIS — Z12.11 SCREENING FOR COLON CANCER: ICD-10-CM

## 2019-07-24 DIAGNOSIS — K21.9 CHRONIC GERD: ICD-10-CM

## 2019-07-24 PROCEDURE — 99204 OFFICE O/P NEW MOD 45 MIN: CPT | Performed by: NURSE PRACTITIONER

## 2019-07-24 ASSESSMENT — ENCOUNTER SYMPTOMS
VOMITING: 0
BLOOD IN STOOL: 0
NAUSEA: 0
COUGH: 0
DIARRHEA: 0
SORE THROAT: 0
ABDOMINAL DISTENTION: 0
VOICE CHANGE: 1
TROUBLE SWALLOWING: 1
RECTAL PAIN: 0
BACK PAIN: 1
ABDOMINAL PAIN: 0
CONSTIPATION: 0
CHEST TIGHTNESS: 0
SHORTNESS OF BREATH: 0
ROS SKIN COMMENTS: HIVES AND ITCHING

## 2019-08-06 ENCOUNTER — ANESTHESIA EVENT (OUTPATIENT)
Dept: OPERATING ROOM | Age: 59
End: 2019-08-06

## 2019-08-06 ENCOUNTER — TELEPHONE (OUTPATIENT)
Dept: GASTROENTEROLOGY | Age: 59
End: 2019-08-06

## 2019-08-06 ENCOUNTER — APPOINTMENT (OUTPATIENT)
Dept: OPERATING ROOM | Age: 59
End: 2019-08-06

## 2019-08-06 ENCOUNTER — HOSPITAL ENCOUNTER (OUTPATIENT)
Age: 59
Setting detail: OUTPATIENT SURGERY
Discharge: HOME | End: 2019-08-06
Attending: INTERNAL MEDICINE | Admitting: INTERNAL MEDICINE
Payer: COMMERCIAL

## 2019-08-06 ENCOUNTER — ANESTHESIA (OUTPATIENT)
Dept: OPERATING ROOM | Age: 59
End: 2019-08-06

## 2019-08-06 ENCOUNTER — HOSPITAL ENCOUNTER (OUTPATIENT)
Age: 59
Setting detail: SPECIMEN
Discharge: HOME | End: 2019-08-06
Payer: COMMERCIAL

## 2019-08-06 VITALS
HEART RATE: 70 BPM | RESPIRATION RATE: 18 BRPM | HEIGHT: 69 IN | OXYGEN SATURATION: 96 % | WEIGHT: 215 LBS | SYSTOLIC BLOOD PRESSURE: 97 MMHG | DIASTOLIC BLOOD PRESSURE: 67 MMHG | BODY MASS INDEX: 31.84 KG/M2

## 2019-08-06 VITALS — SYSTOLIC BLOOD PRESSURE: 132 MMHG | OXYGEN SATURATION: 94 % | DIASTOLIC BLOOD PRESSURE: 88 MMHG

## 2019-08-06 PROCEDURE — 43248 EGD GUIDE WIRE INSERTION: CPT | Performed by: INTERNAL MEDICINE

## 2019-08-06 PROCEDURE — 43248 EGD GUIDE WIRE INSERTION: CPT

## 2019-08-06 PROCEDURE — 88312 SPECIAL STAINS GROUP 1: CPT

## 2019-08-06 PROCEDURE — G8907 PT DOC NO EVENTS ON DISCHARG: HCPCS

## 2019-08-06 PROCEDURE — 43239 EGD BIOPSY SINGLE/MULTIPLE: CPT | Performed by: INTERNAL MEDICINE

## 2019-08-06 PROCEDURE — G8918 PT W/O PREOP ORDER IV AB PRO: HCPCS

## 2019-08-06 PROCEDURE — 43239 EGD BIOPSY SINGLE/MULTIPLE: CPT

## 2019-08-06 PROCEDURE — 88305 TISSUE EXAM BY PATHOLOGIST: CPT

## 2019-08-06 RX ORDER — DIPHENHYDRAMINE HYDROCHLORIDE 50 MG/ML
12.5 INJECTION INTRAMUSCULAR; INTRAVENOUS
Status: DISCONTINUED | OUTPATIENT
Start: 2019-08-06 | End: 2019-08-06 | Stop reason: HOSPADM

## 2019-08-06 RX ORDER — PROMETHAZINE HYDROCHLORIDE 25 MG/ML
6.25 INJECTION, SOLUTION INTRAMUSCULAR; INTRAVENOUS
Status: DISCONTINUED | OUTPATIENT
Start: 2019-08-06 | End: 2019-08-06 | Stop reason: HOSPADM

## 2019-08-06 RX ORDER — ONDANSETRON 2 MG/ML
4 INJECTION INTRAMUSCULAR; INTRAVENOUS
Status: DISCONTINUED | OUTPATIENT
Start: 2019-08-06 | End: 2019-08-06 | Stop reason: HOSPADM

## 2019-08-06 RX ORDER — SODIUM CHLORIDE 9 MG/ML
INJECTION, SOLUTION INTRAVENOUS CONTINUOUS
Status: DISCONTINUED | OUTPATIENT
Start: 2019-08-06 | End: 2019-08-06 | Stop reason: HOSPADM

## 2019-08-06 RX ORDER — LIDOCAINE HYDROCHLORIDE 10 MG/ML
INJECTION, SOLUTION INFILTRATION; PERINEURAL PRN
Status: DISCONTINUED | OUTPATIENT
Start: 2019-08-06 | End: 2019-08-06 | Stop reason: SDUPTHER

## 2019-08-06 RX ORDER — PROPOFOL 10 MG/ML
INJECTION, EMULSION INTRAVENOUS PRN
Status: DISCONTINUED | OUTPATIENT
Start: 2019-08-06 | End: 2019-08-06 | Stop reason: SDUPTHER

## 2019-08-06 RX ADMIN — SODIUM CHLORIDE: 9 INJECTION, SOLUTION INTRAVENOUS at 08:00

## 2019-08-06 RX ADMIN — PROPOFOL 50 MG: 10 INJECTION, EMULSION INTRAVENOUS at 08:51

## 2019-08-06 RX ADMIN — PROPOFOL 50 MG: 10 INJECTION, EMULSION INTRAVENOUS at 08:58

## 2019-08-06 RX ADMIN — PROPOFOL 50 MG: 10 INJECTION, EMULSION INTRAVENOUS at 08:53

## 2019-08-06 RX ADMIN — LIDOCAINE HYDROCHLORIDE 5 ML: 10 INJECTION, SOLUTION INFILTRATION; PERINEURAL at 08:51

## 2019-08-06 NOTE — ANESTHESIA POSTPROCEDURE EVALUATION
Department of Anesthesiology  Postprocedure Note    Patient: Sherrie Amin  MRN: [id number]  YOB: 1960  Date of evaluation: 8/6/2019  Time:  9:02 AM     Procedure Summary     Date:  08/06/19 Room / Location:  [facility] ENDO 01 / [facility] OR    Anesthesia Start:  0846 Anesthesia Stop:      Procedures:       EGD BIOPSY (N/A Esophagus)      EGD DILATION SAVORY Diagnosis:  Dana [place] - THRUSH)    Surgeon:  Demond Hampton MD Responsible Provider:  MARTIN Higgins CRNA    Anesthesia Type:  MAC ASA Status:  2          Anesthesia Type: MAC    Lis Phase I:      Lis Phase II:      Last vitals: Reviewed and per EMR flowsheets.        Anesthesia Post Evaluation    Patient location during evaluation: bedside  Patient participation: waiting for patient participation  Level of consciousness: awake  Pain score: 0  Airway patency: patent  Nausea & Vomiting: no nausea and no vomiting  Complications: no  Cardiovascular status: blood pressure returned to baseline  Respiratory status: acceptable  Hydration status: euvolemic  Comments:  Report to RN

## 2019-08-14 DIAGNOSIS — M54.16 LUMBAR RADICULAR PAIN: ICD-10-CM

## 2019-08-14 RX ORDER — GABAPENTIN 600 MG/1
600 TABLET ORAL 3 TIMES DAILY
Qty: 270 TABLET | Refills: 0 | OUTPATIENT
Start: 2019-08-14 | End: 2020-02-20 | Stop reason: SDUPTHER

## 2019-08-14 RX ORDER — TIZANIDINE 4 MG/1
4 TABLET ORAL EVERY 6 HOURS PRN
Qty: 60 TABLET | Refills: 2 | Status: SHIPPED | OUTPATIENT
Start: 2019-08-14 | End: 2020-02-20 | Stop reason: SDUPTHER

## 2019-09-30 DIAGNOSIS — Z76.0 MEDICATION REFILL: ICD-10-CM

## 2019-09-30 RX ORDER — OMEPRAZOLE 40 MG/1
40 CAPSULE, DELAYED RELEASE ORAL 2 TIMES DAILY
Qty: 180 CAPSULE | Refills: 3 | Status: SHIPPED | OUTPATIENT
Start: 2019-09-30 | End: 2020-02-20 | Stop reason: SDUPTHER

## 2020-02-20 ENCOUNTER — OFFICE VISIT (OUTPATIENT)
Dept: PRIMARY CARE CLINIC | Age: 60
End: 2020-02-20
Payer: COMMERCIAL

## 2020-02-20 VITALS
HEIGHT: 69 IN | BODY MASS INDEX: 31.4 KG/M2 | TEMPERATURE: 97.9 F | WEIGHT: 212 LBS | DIASTOLIC BLOOD PRESSURE: 72 MMHG | HEART RATE: 69 BPM | OXYGEN SATURATION: 98 % | RESPIRATION RATE: 20 BRPM | SYSTOLIC BLOOD PRESSURE: 138 MMHG

## 2020-02-20 LAB
AMPHETAMINE SCREEN, URINE: NORMAL
APPEARANCE FLUID: CLEAR
BARBITURATE SCREEN, URINE: NORMAL
BENZODIAZEPINE SCREEN, URINE: NORMAL
BILIRUBIN, POC: NORMAL
BLOOD URINE, POC: NORMAL
BUPRENORPHINE URINE: NORMAL
CLARITY, POC: CLEAR
COCAINE METABOLITE SCREEN URINE: NORMAL
COLOR, POC: YELLOW
GABAPENTIN SCREEN, URINE: NORMAL
GLUCOSE URINE, POC: NORMAL
KETONES, POC: NORMAL
LEUKOCYTE EST, POC: NORMAL
MDMA URINE: NORMAL
METHADONE SCREEN, URINE: NORMAL
METHAMPHETAMINE, URINE: NORMAL
NITRITE, POC: NORMAL
OPIATE SCREEN URINE: NORMAL
OXYCODONE SCREEN URINE: NORMAL
PH, POC: 6
PHENCYCLIDINE SCREEN URINE: NORMAL
PROPOXYPHENE SCREEN, URINE: NORMAL
PROTEIN, POC: NORMAL
SPECIFIC GRAVITY, POC: 1.03
THC SCREEN, URINE: NORMAL
TRICYCLIC ANTIDEPRESSANTS, UR: NORMAL
UROBILINOGEN, POC: 0.2

## 2020-02-20 PROCEDURE — 80305 DRUG TEST PRSMV DIR OPT OBS: CPT | Performed by: NURSE PRACTITIONER

## 2020-02-20 PROCEDURE — 99396 PREV VISIT EST AGE 40-64: CPT | Performed by: NURSE PRACTITIONER

## 2020-02-20 PROCEDURE — 81002 URINALYSIS NONAUTO W/O SCOPE: CPT | Performed by: NURSE PRACTITIONER

## 2020-02-20 RX ORDER — GABAPENTIN 600 MG/1
600 TABLET ORAL 3 TIMES DAILY
Qty: 270 TABLET | Refills: 0 | Status: CANCELLED | OUTPATIENT
Start: 2020-02-20 | End: 2020-05-20

## 2020-02-20 RX ORDER — TIZANIDINE 4 MG/1
4 TABLET ORAL EVERY 6 HOURS PRN
Qty: 60 TABLET | Refills: 2 | Status: SHIPPED | OUTPATIENT
Start: 2020-02-20 | End: 2020-08-17

## 2020-02-20 RX ORDER — GABAPENTIN 600 MG/1
600 TABLET ORAL 2 TIMES DAILY
Qty: 60 TABLET | Refills: 2 | Status: SHIPPED | OUTPATIENT
Start: 2020-02-20 | End: 2020-02-20 | Stop reason: SDUPTHER

## 2020-02-20 RX ORDER — OMEPRAZOLE 40 MG/1
40 CAPSULE, DELAYED RELEASE ORAL 2 TIMES DAILY
Qty: 180 CAPSULE | Refills: 3 | Status: SHIPPED | OUTPATIENT
Start: 2020-02-20 | End: 2021-03-15

## 2020-02-20 RX ORDER — GABAPENTIN 600 MG/1
600 TABLET ORAL 2 TIMES DAILY
Qty: 180 TABLET | Refills: 0 | OUTPATIENT
Start: 2020-02-20 | End: 2020-11-18

## 2020-02-20 ASSESSMENT — ENCOUNTER SYMPTOMS
BACK PAIN: 1
RESPIRATORY NEGATIVE: 1
GASTROINTESTINAL NEGATIVE: 1
EYES NEGATIVE: 1

## 2020-02-20 NOTE — PROGRESS NOTES
[facility] PRIMARY CARE  [address] [address]  [address] [address]  Dept: [phone]  Dept Fax: [phone]  Loc: [phone]    Rey Stephenson is a 62 y.o. male who presents today for his medical conditions/complaints as noted below. Rey Stephenson is c/o of Annual Exam (questions on shingles); Flank Pain (increased restroom use, hip pain past PSA but its been a while); and Back Pain (back and neck pain has trouble driving for longer than 45 minutes at a time)      Chief Complaint   Patient presents with    Annual Exam     questions on shingles    Flank Pain     increased restroom use, hip pain past PSA but its been a while    Back Pain     back and neck pain has trouble driving for longer than 45 minutes at a time       HPI:     HPI   Patient here for annual physical exam and is having complaints of  flank pain/back pain. Patient reports that back pain and neck pain has been worsening. He has noticed increased urination during the night as well. He has had issues with keeping a stream during urination. Having difficulties emptying bladder completely. He has been taking Neurontin 600 mg 2 tablets at night. He does report it makes him very tired. Does take Naprosyn in the AM 2 tablets.        Past Medical History:   Diagnosis Date    Backache, unspecified     Artis's esophagus     Bleeds easily (HCC)     Diseases of lips     Esophageal reflux     Glossitis     Hematospermia     Hoarseness     Insomnia, unspecified     Mononeuritis of unspecified site     Neck pain     Neuropathy     nate feet    Other abnormal clinical finding     Other chronic pain     Other malaise and fatigue     Other specified disorders of breast     Sore throat     Unspecified essential hypertension     Urinary tract infection, site not specified         Past Surgical History:   Procedure Laterality Date    APPENDECTOMY     Gary Agrawal Dr Pati Jackson @ [facility]-normal per patient    COLONOSCOPY  3-7-14    Dr Norma Jackson @ [facility]     UPPER GASTROINTESTINAL ENDOSCOPY  2-19-14    Dr Norma Oliva 8/6/2019    Dr Army Armstrong over wire-54 F-Gastropathy, gastric polyps, esophagitis-No recall    UPPER GASTROINTESTINAL ENDOSCOPY  8/6/2019    Dr Army Armstrong over wire-54 F-Gastropathy, gastric polyps, esophagitis-No recall       Social History     Tobacco Use    Smoking status: Never Smoker    Smokeless tobacco: Never Used   Substance Use Topics    Alcohol use: No        Current Outpatient Medications   Medication Sig Dispense Refill    tiZANidine (ZANAFLEX) 4 MG tablet Take 1 tablet by mouth every 6 hours as needed (PRN) 60 tablet 2    omeprazole (PRILOSEC) 40 MG delayed release capsule Take 1 capsule by mouth 2 times daily 180 capsule 3    gabapentin (NEURONTIN) 600 MG tablet Take 1 tablet by mouth 2 times daily for 30 days. 180 tablet 0    Multiple Vitamins-Minerals (MULTIVITAMIN ADULT PO) Take by mouth daily      lisinopril (PRINIVIL;ZESTRIL) 10 MG tablet TAKE 1 TABLET BY MOUTH ONE TIME A DAY 30 tablet 11    montelukast (SINGULAIR) 10 MG tablet Take 1 tablet by mouth daily 90 tablet 3    dicyclomine (BENTYL) 10 MG capsule Take 1 capsule by mouth 3 times daily as needed (cramping) 120 capsule 3    Fexofenadine HCl (ALLEGRA PO) Take by mouth       No current facility-administered medications for this visit.         Allergies   Allergen Reactions    Iodine Rash     Skin peels, feet and hands turn red       Family History   Problem Relation Age of Onset    Diabetes Mother     Diabetes Father     Heart Disease Father     Crohn's Disease Daughter     Crohn's Disease Other     Colon Cancer Neg Hx     Colon Polyps Neg Hx     Liver Cancer Neg Hx     Stomach Cancer Neg Hx     Rectal Cancer Neg Hx     Esophageal Cancer Neg Hx     Liver Disease Neg Hx          Subjective:      Review of Systems   Constitutional: Negative. HENT: Negative. Eyes: Negative. Respiratory: Negative. Cardiovascular: Negative. Gastrointestinal: Negative. Endocrine: Negative. Genitourinary: Positive for difficulty urinating and urgency. Musculoskeletal: Positive for arthralgias and back pain. Skin: Negative. Neurological: Positive for numbness ( Lower legs). Hematological: Negative. Psychiatric/Behavioral: Negative. Objective:     Physical Exam  Vitals signs and nursing note reviewed. Constitutional:       Appearance: Normal appearance. HENT:      Head: Normocephalic and atraumatic. Right Ear: Hearing, tympanic membrane, ear canal and external ear normal.      Left Ear: Hearing, tympanic membrane, ear canal and external ear normal.      Nose: Nose normal.      Mouth/Throat:      Lips: Pink. Mouth: Mucous membranes are moist.      Pharynx: Oropharynx is clear. Eyes:      General: Lids are normal.      Extraocular Movements: Extraocular movements intact. Conjunctiva/sclera: Conjunctivae normal.      Pupils: Pupils are equal, round, and reactive to light. Neck:      Musculoskeletal: Full passive range of motion without pain, normal range of motion and neck supple. Thyroid: No thyromegaly. Cardiovascular:      Rate and Rhythm: Normal rate and regular rhythm. Pulses: Normal pulses. Dorsalis pedis pulses are 2+ on the right side and 2+ on the left side. Posterior tibial pulses are 2+ on the right side and 2+ on the left side. Heart sounds: Normal heart sounds. Pulmonary:      Effort: Pulmonary effort is normal.      Breath sounds: Normal breath sounds and air entry. Abdominal:      General: Bowel sounds are normal.      Palpations: Abdomen is soft. Musculoskeletal:      Thoracic back: He exhibits normal range of motion and no tenderness.       Lumbar back: He exhibits decreased capsule     Sig: Take 1 capsule by mouth 2 times daily     Dispense:  180 capsule     Refill:  3    DISCONTD: gabapentin (NEURONTIN) 600 MG tablet     Sig: Take 1 tablet by mouth 2 times daily for 30 days. Dispense:  60 tablet     Refill:  2    gabapentin (NEURONTIN) 600 MG tablet     Sig: Take 1 tablet by mouth 2 times daily for 30 days. Dispense:  180 tablet     Refill:  0        Patient offered educational handouts and has had all questions answered. Patient voices understanding and agrees to plans along with risks and benefits of plan. Patient is instructed to continue prior meds, diet, and exercise plans as instructed. Patient agrees to follow up as instructed and sooner if needed. Patient agrees to go to ER if condition becomes emergent. EMR Dragon/transcription disclaimer: Some of this encounter note is an electronic transcription/translation of spoken language to printed text. The electronic translation of spoken language may permit erroneous, or at times, nonsensical words or phrases to be inadvertently transcribed.  Although I have reviewed the note for such errors, some may still exist.    Electronically signed by MARTIN Gardner on 2/20/2020 at 4:32 PM

## 2020-02-24 DIAGNOSIS — E55.9 VITAMIN D DEFICIENCY: ICD-10-CM

## 2020-02-24 DIAGNOSIS — E78.2 MIXED HYPERLIPIDEMIA: ICD-10-CM

## 2020-02-24 DIAGNOSIS — M54.16 LUMBAR RADICULAR PAIN: ICD-10-CM

## 2020-02-24 DIAGNOSIS — M79.10 MYALGIA: ICD-10-CM

## 2020-02-24 DIAGNOSIS — R73.09 ELEVATED GLUCOSE: ICD-10-CM

## 2020-02-24 DIAGNOSIS — Z13.29 THYROID DISORDER SCREEN: ICD-10-CM

## 2020-02-24 DIAGNOSIS — Z12.5 ENCOUNTER FOR SCREENING FOR MALIGNANT NEOPLASM OF PROSTATE: ICD-10-CM

## 2020-02-24 LAB
ALBUMIN SERPL-MCNC: 4.6 G/DL (ref 3.5–5.2)
ALP BLD-CCNC: 98 U/L (ref 40–130)
ALT SERPL-CCNC: 21 U/L (ref 5–41)
ANION GAP SERPL CALCULATED.3IONS-SCNC: 14 MMOL/L (ref 7–19)
AST SERPL-CCNC: 26 U/L (ref 5–40)
BASOPHILS ABSOLUTE: 0.1 K/UL (ref 0–0.2)
BASOPHILS RELATIVE PERCENT: 1.1 % (ref 0–1)
BILIRUB SERPL-MCNC: 0.8 MG/DL (ref 0.2–1.2)
BUN BLDV-MCNC: 25 MG/DL (ref 6–20)
CALCIUM SERPL-MCNC: 9.4 MG/DL (ref 8.6–10)
CHLORIDE BLD-SCNC: 104 MMOL/L (ref 98–111)
CHOLESTEROL, FASTING: 174 MG/DL (ref 160–199)
CO2: 25 MMOL/L (ref 22–29)
CREAT SERPL-MCNC: 1.1 MG/DL (ref 0.5–1.2)
EOSINOPHILS ABSOLUTE: 0.3 K/UL (ref 0–0.6)
EOSINOPHILS RELATIVE PERCENT: 5.2 % (ref 0–5)
GFR NON-AFRICAN AMERICAN: >60
GLUCOSE BLD-MCNC: 92 MG/DL (ref 74–109)
HBA1C MFR BLD: 4.9 % (ref 4–6)
HCT VFR BLD CALC: 50.1 % (ref 42–52)
HDLC SERPL-MCNC: 43 MG/DL (ref 55–121)
HEMOGLOBIN: 17.1 G/DL (ref 14–18)
IMMATURE GRANULOCYTES #: 0 K/UL
LDL CHOLESTEROL CALCULATED: 102 MG/DL
LYMPHOCYTES ABSOLUTE: 1.8 K/UL (ref 1.1–4.5)
LYMPHOCYTES RELATIVE PERCENT: 32.9 % (ref 20–40)
MCH RBC QN AUTO: 32.1 PG (ref 27–31)
MCHC RBC AUTO-ENTMCNC: 34.1 G/DL (ref 33–37)
MCV RBC AUTO: 94.2 FL (ref 80–94)
MONOCYTES ABSOLUTE: 0.4 K/UL (ref 0–0.9)
MONOCYTES RELATIVE PERCENT: 7.1 % (ref 0–10)
NEUTROPHILS ABSOLUTE: 2.9 K/UL (ref 1.5–7.5)
NEUTROPHILS RELATIVE PERCENT: 53.5 % (ref 50–65)
PDW BLD-RTO: 12.3 % (ref 11.5–14.5)
PLATELET # BLD: 219 K/UL (ref 130–400)
PMV BLD AUTO: 10.2 FL (ref 9.4–12.4)
POTASSIUM SERPL-SCNC: 4.5 MMOL/L (ref 3.5–5)
PROSTATE SPECIFIC ANTIGEN: 0.65 NG/ML (ref 0–4)
RBC # BLD: 5.32 M/UL (ref 4.7–6.1)
SODIUM BLD-SCNC: 143 MMOL/L (ref 136–145)
TOTAL CK: 150 U/L (ref 39–308)
TOTAL PROTEIN: 7.2 G/DL (ref 6.6–8.7)
TRIGLYCERIDE, FASTING: 147 MG/DL (ref 0–149)
TSH REFLEX FT4: 2.2 UIU/ML (ref 0.35–5.5)
VITAMIN D 25-HYDROXY: 32.8 NG/ML
WBC # BLD: 5.4 K/UL (ref 4.8–10.8)

## 2020-06-18 RX ORDER — MONTELUKAST SODIUM 10 MG/1
TABLET ORAL
Qty: 90 TABLET | Refills: 3 | Status: SHIPPED | OUTPATIENT
Start: 2020-06-18 | End: 2021-07-19

## 2020-08-17 RX ORDER — TIZANIDINE 4 MG/1
TABLET ORAL
Qty: 60 TABLET | Refills: 2 | Status: SHIPPED | OUTPATIENT
Start: 2020-08-17 | End: 2021-02-22

## 2020-08-20 ENCOUNTER — OFFICE VISIT (OUTPATIENT)
Dept: PRIMARY CARE CLINIC | Age: 60
End: 2020-08-20
Payer: COMMERCIAL

## 2020-08-20 ENCOUNTER — HOSPITAL ENCOUNTER (OUTPATIENT)
Dept: GENERAL RADIOLOGY | Age: 60
Discharge: HOME | End: 2020-08-20
Payer: COMMERCIAL

## 2020-08-20 VITALS
HEIGHT: 69 IN | OXYGEN SATURATION: 98 % | BODY MASS INDEX: 32.14 KG/M2 | HEART RATE: 69 BPM | TEMPERATURE: 98.4 F | DIASTOLIC BLOOD PRESSURE: 78 MMHG | WEIGHT: 217 LBS | SYSTOLIC BLOOD PRESSURE: 126 MMHG

## 2020-08-20 PROCEDURE — 72040 X-RAY EXAM NECK SPINE 2-3 VW: CPT

## 2020-08-20 PROCEDURE — 80305 DRUG TEST PRSMV DIR OPT OBS: CPT | Performed by: NURSE PRACTITIONER

## 2020-08-20 PROCEDURE — 99214 OFFICE O/P EST MOD 30 MIN: CPT | Performed by: NURSE PRACTITIONER

## 2020-08-20 RX ORDER — SALIVA STIMULANT COMB. NO.3
SPRAY, NON-AEROSOL (ML) MUCOUS MEMBRANE
Qty: 44 ML | Refills: 2 | Status: SHIPPED | OUTPATIENT
Start: 2020-08-20 | End: 2021-03-16 | Stop reason: ALTCHOICE

## 2020-08-20 RX ORDER — LISINOPRIL 10 MG/1
TABLET ORAL
Qty: 90 TABLET | Refills: 2 | Status: SHIPPED | OUTPATIENT
Start: 2020-08-20 | End: 2021-05-03

## 2020-08-20 RX ORDER — GABAPENTIN 100 MG/1
100 CAPSULE ORAL DAILY
Qty: 30 CAPSULE | Refills: 3 | Status: SHIPPED | OUTPATIENT
Start: 2020-08-20 | End: 2021-08-27 | Stop reason: SDUPTHER

## 2020-08-20 RX ORDER — PRENATAL VIT 91/IRON/FOLIC/DHA 28-975-200
COMBINATION PACKAGE (EA) ORAL
Qty: 28 G | Refills: 0 | Status: SHIPPED | OUTPATIENT
Start: 2020-08-20

## 2020-08-20 RX ORDER — FLUCONAZOLE 150 MG/1
150 TABLET ORAL DAILY
Qty: 3 TABLET | Refills: 0 | Status: SHIPPED | OUTPATIENT
Start: 2020-08-20 | End: 2020-08-23

## 2020-08-20 ASSESSMENT — PATIENT HEALTH QUESTIONNAIRE - PHQ9
2. FEELING DOWN, DEPRESSED OR HOPELESS: 0
SUM OF ALL RESPONSES TO PHQ QUESTIONS 1-9: 0
SUM OF ALL RESPONSES TO PHQ QUESTIONS 1-9: 0
SUM OF ALL RESPONSES TO PHQ9 QUESTIONS 1 & 2: 0
1. LITTLE INTEREST OR PLEASURE IN DOING THINGS: 0

## 2020-08-20 NOTE — PROGRESS NOTES
[facility] PRIMARY CARE  [address] [address]  [address] [address]  Dept: [phone]  Dept Fax: [phone]  Loc: [phone]    Eve Estrada is a 62 y.o. male who presents today for his medical conditions/complaints as noted below. Eve Estrada is c/o of 6 Month Follow-Up; Chest Pain (pressure in chest); and Headache (for about 3 weeks)      Chief Complaint   Patient presents with    6 Month Follow-Up    Chest Pain     pressure in chest    Headache     for about 3 weeks       HPI:     HPI   Patient here for follow up on chronic conditions including HTN, neuropathy, and insomnia. He has had some chest tightness that pushes into his throat. He has a known hx of esophageal spasms. He is having some neck pain as well. He is also complaining of extreme dry mouth and yeast again.       Past Medical History:   Diagnosis Date    Backache, unspecified     Artis's esophagus     Bleeds easily (HCC)     Diseases of lips     Esophageal reflux     Glossitis     Hematospermia     Hoarseness     Insomnia, unspecified     Mononeuritis of unspecified site     Neck pain     Neuropathy     nate feet    Other abnormal clinical finding     Other chronic pain     Other malaise and fatigue     Other specified disorders of breast     Sore throat     Unspecified essential hypertension     Urinary tract infection, site not specified         Past Surgical History:   Procedure Laterality Date    APPENDECTOMY      CHOLECYSTECTOMY      COLONOSCOPY      [facility] Gabi Ratliff @ [facility]-normal per patient    COLONOSCOPY  3-7-14    Dr Drea Ratliff @ [facility]     UPPER GASTROINTESTINAL ENDOSCOPY  2-19-14    Dr Drea Vang 8/6/2019    Dr Sarina Byers over wire-54 F-Gastropathy, gastric polyps, esophagitis-No recall   [address]  8/6/2019     Subjective:      Review of Systems   Constitutional: Positive for fatigue. HENT: Negative. Eyes: Negative. Respiratory: Positive for chest tightness. Cardiovascular: Positive for chest pain. Gastrointestinal: Negative. Endocrine: Negative. Genitourinary: Negative. Musculoskeletal: Negative. Skin: Negative. Neurological: Negative. Hematological: Negative. Psychiatric/Behavioral: Negative. Objective:     Physical Exam  Vitals signs and nursing note reviewed. Constitutional:       Appearance: Normal appearance. HENT:      Head: Normocephalic and atraumatic. Right Ear: Hearing, tympanic membrane, ear canal and external ear normal.      Left Ear: Hearing, tympanic membrane, ear canal and external ear normal.      Nose: Nose normal.      Mouth/Throat:      Lips: Pink. Mouth: Mucous membranes are moist.      Pharynx: Oropharynx is clear. Eyes:      General: Lids are normal.      Extraocular Movements: Extraocular movements intact. Conjunctiva/sclera: Conjunctivae normal.      Pupils: Pupils are equal, round, and reactive to light. Neck:      Musculoskeletal: Full passive range of motion without pain, normal range of motion and neck supple. Thyroid: No thyromegaly. Cardiovascular:      Rate and Rhythm: Normal rate and regular rhythm. Pulses: Normal pulses. Dorsalis pedis pulses are 2+ on the right side and 2+ on the left side. Posterior tibial pulses are 2+ on the right side and 2+ on the left side. Heart sounds: Normal heart sounds. Pulmonary:      Effort: Pulmonary effort is normal.      Breath sounds: Normal breath sounds and air entry. Abdominal:      General: Bowel sounds are normal.      Palpations: Abdomen is soft. Musculoskeletal:      Thoracic back: He exhibits normal range of motion and no tenderness. Lumbar back: He exhibits normal range of motion and no tenderness.    Lymphadenopathy: Cervical: No cervical adenopathy. Skin:     General: Skin is warm and dry. Capillary Refill: Capillary refill takes 2 to 3 seconds. Neurological:      General: No focal deficit present. Mental Status: He is alert and oriented to person, place, and time. Mental status is at baseline. Coordination: Coordination is intact. Psychiatric:         Mood and Affect: Mood normal.         Speech: Speech normal.         Behavior: Behavior normal.         Thought Content: Thought content normal.         Cognition and Memory: Cognition and memory normal.         Judgment: Judgment normal.         /78   Pulse 69   Temp 98.4 °F (36.9 °C)   Ht 5' 9\" (1.753 m)   Wt 217 lb (98.4 kg)   SpO2 98%   BMI 32.05 kg/m²     Assessment:      Diagnosis Orders   1. Cervical herniation     2. Lumbar radicular pain  gabapentin (NEURONTIN) 100 MG capsule   3. Chest tightness     4. Esophageal spasm     5. Mixed hyperlipidemia     6. Gastroesophageal reflux disease with esophagitis     7. Dry mouth  Magic Mouthwash (MIRACLE MOUTHWASH)    Artificial Saliva (BIOTENE DRY MOUTH MOISTURIZING) SOLN liquid   8. Essential hypertension  lisinopril (PRINIVIL;ZESTRIL) 10 MG tablet       No results found for this visit on 08/20/20. Plan:     Increase omeprazole up to BID if no improvement will need stress test ordered. Ok to order stress echo. Refills on meds    Mouth wash for dry mouth and yeast.     No follow-ups on file. No orders of the defined types were placed in this encounter. Orders Placed This Encounter   Medications    lisinopril (PRINIVIL;ZESTRIL) 10 MG tablet     Sig: TAKE 1 TABLET BY MOUTH ONE TIME A DAY     Dispense:  90 tablet     Refill:  2    gabapentin (NEURONTIN) 100 MG capsule     Sig: Take 1 capsule by mouth daily for 30 days.      Dispense:  30 capsule     Refill:  3    Magic Mouthwash (MIRACLE MOUTHWASH)     Sig: Swish and spit 5 mLs 4 times daily as needed for Irritation     Dispense: 480 mL     Refill:  1    fluconazole (DIFLUCAN) 150 MG tablet     Sig: Take 1 tablet by mouth daily for 3 days     Dispense:  3 tablet     Refill:  0    terbinafine (LAMISIL) 1 % cream     Sig: Apply topically 2 times daily. Dispense:  28 g     Refill:  0    Artificial Saliva (BIOTENE DRY MOUTH MOISTURIZING) SOLN liquid     Sig: Use daily with teeth brushing     Dispense:  44 mL     Refill:  2        Patient offered educational handouts and has had all questions answered. Patient voices understanding and agrees to plans along with risks and benefits of plan. Patient is instructed to continue prior meds, diet, and exercise plans as instructed. Patient agrees to follow up as instructed and sooner if needed. Patient agrees to go to ER if condition becomes emergent. EMR Dragon/transcription disclaimer: Some of this encounter note is an electronic transcription/translation of spoken language to printed text. The electronic translation of spoken language may permit erroneous, or at times, nonsensical words or phrases to be inadvertently transcribed.  Although I have reviewed the note for such errors, some may still exist.    Electronically signed by MARTIN Palacios on 8/28/2020 at 9:51 PM

## 2020-08-28 ASSESSMENT — ENCOUNTER SYMPTOMS
CHEST TIGHTNESS: 1
EYES NEGATIVE: 1
GASTROINTESTINAL NEGATIVE: 1

## 2020-11-18 NOTE — TELEPHONE ENCOUNTER
Tova Maza called to request a refill on his medication. Last office visit : 8/20/2020   Next office visit : 2/18/2021     Last UDS:   Amphetamine Screen, Urine   Date Value Ref Range Status   02/20/2020 neg  Final     Barbiturate Screen, Urine   Date Value Ref Range Status   02/20/2020 neg  Final     Benzodiazepine Screen, Urine   Date Value Ref Range Status   02/20/2020 neg  Final     Buprenorphine Urine   Date Value Ref Range Status   02/20/2020 neg  Final     Cocaine Metabolite Screen, Urine   Date Value Ref Range Status   02/20/2020 neg  Final     Gabapentin Screen, Urine   Date Value Ref Range Status   02/20/2020 neg  Final     MDMA, Urine   Date Value Ref Range Status   02/20/2020 neg  Final     Methamphetamine, Urine   Date Value Ref Range Status   02/20/2020 neg  Final     Opiate Scrn, Ur   Date Value Ref Range Status   02/20/2020 neg  Final     Oxycodone Screen, Ur   Date Value Ref Range Status   02/20/2020 neg  Final     PCP Screen, Urine   Date Value Ref Range Status   02/20/2020 neg  Final     Propoxyphene Screen, Urine   Date Value Ref Range Status   02/20/2020 neg  Final     THC Screen, Urine   Date Value Ref Range Status   02/20/2020 neg  Final     Tricyclic Antidepressants, Urine   Date Value Ref Range Status   02/20/2020 neg  Final       Last Court : 11/18/2020  Medication Contract: 8/20/2020  Last Fill: 8/20/2020    Requested Prescriptions     Pending Prescriptions Disp Refills    gabapentin (NEURONTIN) 600 MG tablet [Pharmacy Med Name: GABAPENTIN 600MG TABS] 180 tablet 0     Sig: TAKE ONE TABLET TWO TIMES A DAY         Please approve or refuse this medication.    Romulo Carrion

## 2020-11-19 RX ORDER — GABAPENTIN 600 MG/1
TABLET ORAL
Qty: 180 TABLET | Refills: 0 | Status: SHIPPED | OUTPATIENT
Start: 2020-11-19 | End: 2021-05-04

## 2020-11-24 ENCOUNTER — OFFICE VISIT (OUTPATIENT)
Dept: PRIMARY CARE CLINIC | Age: 60
End: 2020-11-24
Payer: COMMERCIAL

## 2020-11-24 VITALS
SYSTOLIC BLOOD PRESSURE: 120 MMHG | TEMPERATURE: 98.2 F | HEIGHT: 69 IN | BODY MASS INDEX: 33.03 KG/M2 | WEIGHT: 223 LBS | RESPIRATION RATE: 18 BRPM | DIASTOLIC BLOOD PRESSURE: 78 MMHG | HEART RATE: 62 BPM | OXYGEN SATURATION: 98 %

## 2020-11-24 PROCEDURE — 99213 OFFICE O/P EST LOW 20 MIN: CPT | Performed by: NURSE PRACTITIONER

## 2020-11-24 RX ORDER — PREDNISONE 10 MG/1
10 TABLET ORAL DAILY
Qty: 7 TABLET | Refills: 0 | Status: SHIPPED | OUTPATIENT
Start: 2020-11-24 | End: 2020-12-01

## 2020-11-24 ASSESSMENT — PATIENT HEALTH QUESTIONNAIRE - PHQ9
SUM OF ALL RESPONSES TO PHQ9 QUESTIONS 1 & 2: 0
SUM OF ALL RESPONSES TO PHQ QUESTIONS 1-9: 0
2. FEELING DOWN, DEPRESSED OR HOPELESS: 0
SUM OF ALL RESPONSES TO PHQ QUESTIONS 1-9: 0
1. LITTLE INTEREST OR PLEASURE IN DOING THINGS: 0
SUM OF ALL RESPONSES TO PHQ QUESTIONS 1-9: 0

## 2020-11-24 ASSESSMENT — ENCOUNTER SYMPTOMS
EYES NEGATIVE: 1
GASTROINTESTINAL NEGATIVE: 1
RESPIRATORY NEGATIVE: 1

## 2020-11-24 NOTE — PROGRESS NOTES
[address] [facility] CARE  [address] [id number] [address] [address]  Dept: [phone]  Dept Fax: [phone]  Loc: [phone]    Loreta Green is a 62 y.o. male who presents today for his medical conditions/complaints as noted below. Loreta Green is c/o of Arm Pain (R forearm pain goes down into wrist and hand and is having trouble and pain when picking things up started 3 weeks ago and has gotten worse states it has been up and down one day it feels better but then other days he cant hardly use it. )      Chief Complaint   Patient presents with    Arm Pain     R forearm pain goes down into wrist and hand and is having trouble and pain when picking things up started 3 weeks ago and has gotten worse states it has been up and down one day it feels better but then other days he cant hardly use it. HPI:     HPI  Patient is here with complaints of right wrist pain. He has noticed the pain worsening over the last 4 weeks. 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 He has not tried any otc meds yet.     Past Medical History:   Diagnosis Date    Backache, unspecified     Artis's esophagus     Bleeds easily (HCC)     Diseases of lips     Esophageal reflux     Glossitis     Hematospermia     Hoarseness     Insomnia, unspecified     Mononeuritis of unspecified site     Neck pain     Neuropathy     nate feet    Other abnormal clinical finding     Other chronic pain     Other malaise and fatigue     Other specified disorders of breast     Sore throat     Unspecified essential hypertension     Urinary tract infection, site not specified         Past Surgical History:   Procedure Laterality Date    APPENDECTOMY      CHOLECYSTECTOMY      COLONOSCOPY      Dr Nelly Crow @ [facility]-normal per patient    COLONOSCOPY  3-7-14    Dr David Crow @ [facility]     UPPER GASTROINTESTINAL ENDOSCOPY  2-19-14    Dr Mendez Perez 8/6/2019    Dr Cullen Ramirez over wire-54 F-Gastropathy, gastric polyps, esophagitis-No recall    UPPER GASTROINTESTINAL ENDOSCOPY  8/6/2019    Dr Cullen Ramirez over wire-54 F-Gastropathy, gastric polyps, esophagitis-No recall       Social History     Tobacco Use    Smoking status: Never Smoker    Smokeless tobacco: Never Used   Substance Use Topics    Alcohol use: No        Current Outpatient Medications   Medication Sig Dispense Refill    predniSONE (DELTASONE) 10 MG tablet Take 1 tablet by mouth daily for 7 days 7 tablet 0    diclofenac (VOLTAREN) 50 MG EC tablet Take 1 tablet by mouth 2 times daily 60 tablet 1    gabapentin (NEURONTIN) 600 MG tablet TAKE ONE TABLET TWO TIMES A  tablet 0    lisinopril (PRINIVIL;ZESTRIL) 10 MG tablet TAKE 1 TABLET BY MOUTH ONE TIME A DAY 90 tablet 2    Magic Mouthwash (MIRACLE MOUTHWASH) Swish and spit 5 mLs 4 times daily as needed for Irritation 480 mL 1    terbinafine (LAMISIL) 1 % cream Apply topically 2 times daily. 28 g 0    Artificial Saliva (BIOTENE DRY MOUTH MOISTURIZING) SOLN liquid Use daily with teeth brushing 44 mL 2    tiZANidine (ZANAFLEX) 4 MG tablet TAKE ONE TABLET EVERY 6 HOURS AS NEEDED 60 tablet 2    montelukast (SINGULAIR) 10 MG tablet TAKE 1 TABLET BY MOUTH ONE TIME A DAY 90 tablet 3    omeprazole (PRILOSEC) 40 MG delayed release capsule Take 1 capsule by mouth 2 times daily 180 capsule 3    Multiple Vitamins-Minerals (MULTIVITAMIN ADULT PO) Take by mouth daily      dicyclomine (BENTYL) 10 MG capsule Take 1 capsule by mouth 3 times daily as needed (cramping) 120 capsule 3    Fexofenadine HCl (ALLEGRA PO) Take by mouth      gabapentin (NEURONTIN) 100 MG capsule Take 1 capsule by mouth daily for 30 days. 30 capsule 3     No current facility-administered medications for this visit.         Allergies   Allergen Reactions    Iodine Rash     Skin peels, feet and hands turn red       Family History   Problem Relation Age of Onset    Diabetes Mother     Diabetes Father     Heart Disease Father     Crohn's Disease Daughter     Crohn's Disease Other     Colon Cancer Neg Hx     Colon Polyps Neg Hx     Liver Cancer Neg Hx     Stomach Cancer Neg Hx     Rectal Cancer Neg Hx     Esophageal Cancer Neg Hx     Liver Disease Neg Hx                Subjective:      Review of Systems   Constitutional: Negative. HENT: Negative. Eyes: Negative. Respiratory: Negative. Cardiovascular: Negative. Gastrointestinal: Negative. Endocrine: Negative. Genitourinary: Negative. Musculoskeletal: Positive for arthralgias (right wrist pain). Skin: Negative. Neurological: Negative. Hematological: Negative. Psychiatric/Behavioral: Negative. Objective:     Physical Exam  Vitals signs and nursing note reviewed. Constitutional:       Appearance: Normal appearance. HENT:      Head: Normocephalic and atraumatic. Right Ear: Hearing, tympanic membrane, ear canal and external ear normal.      Left Ear: Hearing, tympanic membrane, ear canal and external ear normal.      Nose: Nose normal.      Mouth/Throat:      Lips: Pink. Mouth: Mucous membranes are moist.      Pharynx: Oropharynx is clear. Eyes:      General: Lids are normal.      Extraocular Movements: Extraocular movements intact. Conjunctiva/sclera: Conjunctivae normal.      Pupils: Pupils are equal, round, and reactive to light. Neck:      Musculoskeletal: Full passive range of motion without pain, normal range of motion and neck supple. Thyroid: No thyromegaly. Cardiovascular:      Rate and Rhythm: Normal rate and regular rhythm. Pulses: Normal pulses. Dorsalis pedis pulses are 2+ on the right side and 2+ on the left side. Posterior tibial pulses are 2+ on the right side and 2+ on the left side.       Heart sounds: Normal heart sounds. Pulmonary:      Effort: Pulmonary effort is normal.      Breath sounds: Normal breath sounds and air entry. Abdominal:      General: Bowel sounds are normal.      Palpations: Abdomen is soft. Musculoskeletal:      Right wrist: He exhibits decreased range of motion. Thoracic back: He exhibits normal range of motion and no tenderness. Lumbar back: He exhibits normal range of motion and no tenderness. Lymphadenopathy:      Cervical: No cervical adenopathy. Skin:     General: Skin is warm and dry. Capillary Refill: Capillary refill takes less than 2 seconds. Neurological:      General: No focal deficit present. Mental Status: He is alert and oriented to person, place, and time. Mental status is at baseline. Coordination: Coordination is intact. Psychiatric:         Mood and Affect: Mood normal.         Speech: Speech normal.         Behavior: Behavior normal.         Thought Content: Thought content normal.         Cognition and Memory: Cognition and memory normal.         Judgment: Judgment normal.         /78   Pulse 62   Temp 98.2 °F (36.8 °C) (Temporal)   Resp 18   Ht 5' 9\" (1.753 m)   Wt 223 lb (101.2 kg)   SpO2 98%   BMI 32.93 kg/m²     Assessment:      Diagnosis Orders   1. Right wrist tendonitis  predniSONE (DELTASONE) 10 MG tablet    diclofenac (VOLTAREN) 50 MG EC tablet       No results found for this visit on 11/24/20. Plan:     I am treating for tendonitis. If symptoms do not improve will need to have nerve conduction study. He is to get wrist brace and wear as discussed over the next week as well. Return if symptoms worsen or fail to improve. No orders of the defined types were placed in this encounter.       Orders Placed This Encounter   Medications    predniSONE (DELTASONE) 10 MG tablet     Sig: Take 1 tablet by mouth daily for 7 days     Dispense:  7 tablet     Refill:  0    diclofenac (VOLTAREN) 50 MG EC tablet Sig: Take 1 tablet by mouth 2 times daily     Dispense:  60 tablet     Refill:  1        Patient offered educational handouts and has had all questions answered. Patient voices understanding and agrees to plans along with risks and benefits of plan. Patient is instructed to continue prior meds, diet, and exercise plans as instructed. Patient agrees to follow up as instructed and sooner if needed. Patient agrees to go to ER if condition becomes emergent. EMR Dragon/transcription disclaimer: Some of this encounter note is an electronic transcription/translation of spoken language to printed text. The electronic translation of spoken language may permit erroneous, or at times, nonsensical words or phrases to be inadvertently transcribed.  Although I have reviewed the note for such errors, some may still exist.    Electronically signed by MARTIN Delgadillo on 11/24/2020 at 8:16 AM

## 2021-02-22 ENCOUNTER — NURSE TRIAGE (OUTPATIENT)
Dept: OTHER | Facility: CLINIC | Age: 61
End: 2021-02-22

## 2021-02-22 RX ORDER — TIZANIDINE 4 MG/1
TABLET ORAL
Qty: 60 TABLET | Refills: 0 | Status: SHIPPED | OUTPATIENT
Start: 2021-02-22 | End: 2021-04-15

## 2021-02-22 NOTE — TELEPHONE ENCOUNTER
Reason for Disposition   Message left on identified voice mail    Protocols used: NO CONTACT OR DUPLICATE CONTACT CALL-ADULT-AH    Calling back pt for triage , who resides in [place]. No answer, this RN left message for pt to call back office.

## 2021-02-22 NOTE — TELEPHONE ENCOUNTER
I call and [facility] that I would send in his Zanaflex but no further refills until he is seen

## 2021-03-02 ENCOUNTER — OFFICE VISIT (OUTPATIENT)
Dept: PRIMARY CARE CLINIC | Age: 61
End: 2021-03-02
Payer: COMMERCIAL

## 2021-03-02 VITALS
SYSTOLIC BLOOD PRESSURE: 138 MMHG | HEART RATE: 87 BPM | DIASTOLIC BLOOD PRESSURE: 82 MMHG | TEMPERATURE: 97.4 F | RESPIRATION RATE: 20 BRPM | BODY MASS INDEX: 32.73 KG/M2 | HEIGHT: 69 IN | OXYGEN SATURATION: 97 % | WEIGHT: 221 LBS

## 2021-03-02 DIAGNOSIS — M19.90 ARTHRITIS: ICD-10-CM

## 2021-03-02 DIAGNOSIS — I10 ESSENTIAL HYPERTENSION: Primary | ICD-10-CM

## 2021-03-02 DIAGNOSIS — E78.2 MIXED HYPERLIPIDEMIA: ICD-10-CM

## 2021-03-02 DIAGNOSIS — Z12.11 COLON CANCER SCREENING: ICD-10-CM

## 2021-03-02 DIAGNOSIS — H61.91 EARLOBE LESION, RIGHT: ICD-10-CM

## 2021-03-02 DIAGNOSIS — R20.2 PARESTHESIAS: ICD-10-CM

## 2021-03-02 PROCEDURE — 90471 IMMUNIZATION ADMIN: CPT | Performed by: NURSE PRACTITIONER

## 2021-03-02 PROCEDURE — 99214 OFFICE O/P EST MOD 30 MIN: CPT | Performed by: NURSE PRACTITIONER

## 2021-03-02 PROCEDURE — 90750 HZV VACC RECOMBINANT IM: CPT | Performed by: NURSE PRACTITIONER

## 2021-03-02 ASSESSMENT — ENCOUNTER SYMPTOMS
GASTROINTESTINAL NEGATIVE: 1
BACK PAIN: 1
EYES NEGATIVE: 1
RESPIRATORY NEGATIVE: 1

## 2021-03-02 NOTE — PROGRESS NOTES
[address] [facility]  [id number] [place] [address] [address] [address]  Dept: [phone]  Dept Fax: [phone]  Loc: [phone]    Alex Qiu is a 62 y.o. male who presents today for his medical conditions/complaints as noted below. Alex Qiu is c/o of Arm Pain      Chief Complaint   Patient presents with    Arm Pain       HPI:     HPI   Patient is here for follow up continued arm pain. He has been seen in the past for this same pain. He reports that the pain has continued without relief. He has had some cervical radiculopathy. He is having a lot of muscle spasms in his neck as well.      Past Medical History:   Diagnosis Date    Backache, unspecified     Artis's esophagus     Bleeds easily (HCC)     Diseases of lips     Esophageal reflux     Glossitis     Hematospermia     Hoarseness     Insomnia, unspecified     Mononeuritis of unspecified site     Neck pain     Neuropathy     nate feet    Other abnormal clinical finding     Other chronic pain     Other malaise and fatigue     Other specified disorders of breast     Sore throat     Unspecified essential hypertension     Urinary tract infection, site not specified         Past Surgical History:   Procedure Laterality Date    APPENDECTOMY      CHOLECYSTECTOMY      COLONOSCOPY      Dr Sade Montgomery @ [facility]-normal per patient    COLONOSCOPY  3-7-14    Dr Vargas Montgomery @ [facility]     UPPER GASTROINTESTINAL ENDOSCOPY  2-19-14    Dr Vargas Park 8/6/2019    Dr Valeria Thompson over wire-54 F-Gastropathy, gastric polyps, esophagitis-No recall    UPPER GASTROINTESTINAL ENDOSCOPY  8/6/2019    Dr Valeria Thompson over wire-54 F-Gastropathy, gastric polyps, esophagitis-No recall       Social History     Tobacco Use    Smoking status: Never Smoker    Smokeless tobacco: Never Used   Substance Use Topics    Alcohol use: No        Current Outpatient Medications   Medication Sig Dispense Refill    tiZANidine (ZANAFLEX) 4 MG tablet TAKE ONE TABLET BY MOUTH EVERY 6 HOURS AS NEEDED 60 tablet 0    diclofenac (VOLTAREN) 50 MG EC tablet Take 1 tablet by mouth 2 times daily 60 tablet 1    gabapentin (NEURONTIN) 600 MG tablet TAKE ONE TABLET TWO TIMES A  tablet 0    lisinopril (PRINIVIL;ZESTRIL) 10 MG tablet TAKE 1 TABLET BY MOUTH ONE TIME A DAY 90 tablet 2    gabapentin (NEURONTIN) 100 MG capsule Take 1 capsule by mouth daily for 30 days. 30 capsule 3    Magic Mouthwash (MIRACLE MOUTHWASH) Swish and spit 5 mLs 4 times daily as needed for Irritation 480 mL 1    terbinafine (LAMISIL) 1 % cream Apply topically 2 times daily. 28 g 0    Artificial Saliva (BIOTENE DRY MOUTH MOISTURIZING) SOLN liquid Use daily with teeth brushing 44 mL 2    montelukast (SINGULAIR) 10 MG tablet TAKE 1 TABLET BY MOUTH ONE TIME A DAY 90 tablet 3    omeprazole (PRILOSEC) 40 MG delayed release capsule Take 1 capsule by mouth 2 times daily 180 capsule 3    Multiple Vitamins-Minerals (MULTIVITAMIN ADULT PO) Take by mouth daily      dicyclomine (BENTYL) 10 MG capsule Take 1 capsule by mouth 3 times daily as needed (cramping) 120 capsule 3    Fexofenadine HCl (ALLEGRA PO) Take by mouth       No current facility-administered medications for this visit. Allergies   Allergen Reactions    Iodine Rash     Skin peels, feet and hands turn red       Family History   Problem Relation Age of Onset    Diabetes Mother     Diabetes Father     Heart Disease Father     Crohn's Disease Daughter     Crohn's Disease Other     Colon Cancer Neg Hx     Colon Polyps Neg Hx     Liver Cancer Neg Hx     Stomach Cancer Neg Hx     Rectal Cancer Neg Hx     Esophageal Cancer Neg Hx     Liver Disease Neg Hx                Subjective:      Review of Systems   Constitutional: Negative. HENT: Negative. Eyes: Negative. Respiratory: Negative. Cardiovascular: Negative. Gastrointestinal: Negative. Endocrine: Negative. Genitourinary: Negative. Musculoskeletal: Positive for arthralgias, back pain and myalgias. Skin: Negative. Neurological: Positive for numbness (tingling in arms). Hematological: Negative. Psychiatric/Behavioral: Negative. Objective:     Physical Exam  Vitals signs and nursing note reviewed. Constitutional:       Appearance: Normal appearance. HENT:      Head: Normocephalic and atraumatic. Right Ear: Hearing, tympanic membrane, ear canal and external ear normal.      Left Ear: Hearing, tympanic membrane, ear canal and external ear normal.      Ears:        Nose: Nose normal.      Mouth/Throat:      Lips: Pink. Mouth: Mucous membranes are moist.      Pharynx: Oropharynx is clear. Eyes:      General: Lids are normal.      Extraocular Movements: Extraocular movements intact. Conjunctiva/sclera: Conjunctivae normal.      Pupils: Pupils are equal, round, and reactive to light. Neck:      Musculoskeletal: Full passive range of motion without pain, normal range of motion and neck supple. Thyroid: No thyromegaly. Cardiovascular:      Rate and Rhythm: Normal rate and regular rhythm. Pulses: Normal pulses. Dorsalis pedis pulses are 2+ on the right side and 2+ on the left side. Posterior tibial pulses are 2+ on the right side and 2+ on the left side. Heart sounds: Normal heart sounds. Pulmonary:      Effort: Pulmonary effort is normal.      Breath sounds: Normal breath sounds and air entry. Abdominal:      General: Bowel sounds are normal.      Palpations: Abdomen is soft. Musculoskeletal:      Cervical back: He exhibits decreased range of motion and tenderness. Thoracic back: He exhibits normal range of motion and no tenderness. Lumbar back: He exhibits normal range of motion and no tenderness.         Arms:    Lymphadenopathy:      Cervical: No cervical adenopathy. Skin:     General: Skin is warm and dry. Capillary Refill: Capillary refill takes 2 to 3 seconds. Neurological:      General: No focal deficit present. Mental Status: He is alert and oriented to person, place, and time. Mental status is at baseline. Coordination: Coordination is intact. Psychiatric:         Mood and Affect: Mood normal.         Speech: Speech normal.         Behavior: Behavior normal.         Thought Content: Thought content normal.         Cognition and Memory: Cognition and memory normal.         Judgment: Judgment normal.         /82   Pulse 87   Temp 97.4 °F (36.3 °C) (Temporal)   Resp 20   Ht 5' 9\" (1.753 m)   Wt 221 lb (100.2 kg)   SpO2 97%   BMI 32.64 kg/m²     Assessment:      Diagnosis Orders   1. Essential hypertension  CBC Auto Differential    Comprehensive Metabolic Panel   2. Mixed hyperlipidemia  Microalbumin / Creatinine Urine Ratio    Lipid, Fasting   3. Colon cancer screening  Jonnathan Davis MD, Gastroenterology, [place]   4. Paresthesias  Nerve conduction test    Sedimentation Rate    Rheumatoid Factor   5. Arthritis  Sedimentation Rate    Rheumatoid Factor   6. Earlobe lesion, right  [address], [place], Otolaryngology, [facility]       No results found for this visit on 03/02/21. Plan:     Shingle vaccine in office,  I am checking labs -we will call with these results. Referral to GI for colon cancer screen  Nerve conduction study for upper extremities. If this returns normal will check MRI of cervical spine as discussed. Return in about 6 months (around 9/2/2021), or if symptoms worsen or fail to improve.     Orders Placed This Encounter   Procedures    Zoster recombinant [facility])    Microalbumin / Creatinine Urine Ratio     Standing Status:   Future     Standing Expiration Date:   3/2/2022    CBC Auto Differential     Standing Status:   Future     Standing Expiration Date:   3/2/2022   [facility] Comprehensive Metabolic Panel     Standing Status:   Future     Standing Expiration Date:   3/2/2022    Lipid, Fasting     Standing Status:   Future     Standing Expiration Date:   3/2/2022    Sedimentation Rate     Standing Status:   Future     Standing Expiration Date:   3/2/2022    Rheumatoid Factor     Standing Status:   Future     Standing Expiration Date:   3/2/2022   Frankie Bass MD, Gastroenterology, [place]     Referral Priority:   Routine     Referral Type:   Eval and Treat     Referral Reason:   Specialty Services Required     Referred to Provider:   Kim Portillo MD     Requested Specialty:   Gastroenterology     Number of Visits Requested:   [facility], Otolaryngology, [place]     Referral Priority:   Routine     Referral Type:   Eval and Treat     Referral Reason:   Specialty Services Required     Referred to Provider:   Alex Quick PA-C     Requested Specialty:   General Surgery     Number of Visits Requested:   1    Nerve conduction test     Standing Status:   Future     Standing Expiration Date:   3/2/2022       No orders of the defined types were placed in this encounter. Patient offered educational handouts and has had all questions answered. Patient voices understanding and agrees to plans along with risks and benefits of plan. Patient is instructed to continue prior meds, diet, and exercise plans as instructed. Patient agrees to follow up as instructed and sooner if needed. Patient agrees to go to ER if condition becomes emergent. EMR Dragon/transcription disclaimer: Some of this encounter note is an electronic transcription/translation of spoken language to printed text. The electronic translation of spoken language may permit erroneous, or at times, nonsensical words or phrases to be inadvertently transcribed.  Although I have reviewed the note for such errors, some may still exist.    Electronically signed by MARTIN Ren on 3/2/2021 at 3:57 PM

## 2021-03-02 NOTE — PROGRESS NOTES
After obtaining consent, and per orders of MARTIN Luna, an injection of Shingrix was given in Left deltoid by [facility]. Patient tolerated well with no immediate adverse reaction. Patient was released from the office with no concerns.

## 2021-03-10 ENCOUNTER — OFFICE VISIT (OUTPATIENT)
Dept: ENT CLINIC | Age: 61
End: 2021-03-10
Payer: COMMERCIAL

## 2021-03-10 VITALS
DIASTOLIC BLOOD PRESSURE: 70 MMHG | BODY MASS INDEX: 31.99 KG/M2 | SYSTOLIC BLOOD PRESSURE: 134 MMHG | WEIGHT: 216 LBS | HEIGHT: 69 IN

## 2021-03-10 DIAGNOSIS — H61.23 BILATERAL IMPACTED CERUMEN: Primary | ICD-10-CM

## 2021-03-10 DIAGNOSIS — H61.91 EARLOBE LESION, RIGHT: ICD-10-CM

## 2021-03-10 PROCEDURE — 99203 OFFICE O/P NEW LOW 30 MIN: CPT | Performed by: PHYSICIAN ASSISTANT

## 2021-03-10 PROCEDURE — 69210 REMOVE IMPACTED EAR WAX UNI: CPT | Performed by: PHYSICIAN ASSISTANT

## 2021-03-10 ASSESSMENT — ENCOUNTER SYMPTOMS
VOICE CHANGE: 0
SINUS PRESSURE: 0
EYE PAIN: 0
FACIAL SWELLING: 0
SINUS PAIN: 0
PHOTOPHOBIA: 0
TROUBLE SWALLOWING: 0
RHINORRHEA: 0
SORE THROAT: 0

## 2021-03-10 NOTE — ASSESSMENT & PLAN NOTE
Right earlobe lesion2 to 3 mm in sizerule out basal cell carcinoma  Plan: The risks, benefits, and options were discussed with the patient. He is agreeable to undergo excisional surgery under local anesthesia here at the office in the next week or so. He is agreeable to accept all risks and proceed as planned.

## 2021-03-10 NOTE — PROGRESS NOTES
[facility] OTOLARYNGOLOGY/ENT  Mr. Cesar Hope is a pleasant 81-year-old  male that was referred by Richie Cullen due to an enlarging skin lesion to the right anterior earlobe. He reports this was first noted about a month or 2 ago. Since he initially noticed this lesion, he believes the lesion has gotten larger in size and will spontaneously bleed off and on. 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 He denies having a skin cancer in the past.        Allergies: Iodine      Current Outpatient Medications   Medication Sig Dispense Refill    tiZANidine (ZANAFLEX) 4 MG tablet TAKE ONE TABLET BY MOUTH EVERY 6 HOURS AS NEEDED 60 tablet 0    diclofenac (VOLTAREN) 50 MG EC tablet Take 1 tablet by mouth 2 times daily 60 tablet 1    lisinopril (PRINIVIL;ZESTRIL) 10 MG tablet TAKE 1 TABLET BY MOUTH ONE TIME A DAY 90 tablet 2    Magic Mouthwash (MIRACLE MOUTHWASH) Swish and spit 5 mLs 4 times daily as needed for Irritation 480 mL 1    terbinafine (LAMISIL) 1 % cream Apply topically 2 times daily. 28 g 0    Artificial Saliva (BIOTENE DRY MOUTH MOISTURIZING) SOLN liquid Use daily with teeth brushing 44 mL 2    montelukast (SINGULAIR) 10 MG tablet TAKE 1 TABLET BY MOUTH ONE TIME A DAY 90 tablet 3    omeprazole (PRILOSEC) 40 MG delayed release capsule Take 1 capsule by mouth 2 times daily 180 capsule 3    Multiple Vitamins-Minerals (MULTIVITAMIN ADULT PO) Take by mouth daily      dicyclomine (BENTYL) 10 MG capsule Take 1 capsule by mouth 3 times daily as needed (cramping) 120 capsule 3    Fexofenadine HCl (ALLEGRA PO) Take by mouth      gabapentin (NEURONTIN) 600 MG tablet TAKE ONE TABLET TWO TIMES A  tablet 0    gabapentin (NEURONTIN) 100 MG capsule Take 1 capsule by mouth daily for 30 days. 30 capsule 3     No current facility-administered medications for this visit.         Past Surgical History:   Procedure Laterality Date    APPENDECTOMY      Adeline Prabhakar @ [facility]-normal per patient    COLONOSCOPY  3-7-14    Dr Carlos Prabhakar @ [facility]     UPPER GASTROINTESTINAL ENDOSCOPY  2-19-14    Dr Carlos Ashford 8/6/2019    Dr Kimberli Shaffer over wire-54 F-Gastropathy, gastric polyps, esophagitis-No recall    UPPER GASTROINTESTINAL ENDOSCOPY  8/6/2019    Dr Kimberli Shaffer over wire-54 F-Gastropathy, gastric polyps, esophagitis-No recall       Past Medical History:   Diagnosis Date    Backache, unspecified     Artis's esophagus     Bleeds easily ([address]     Diseases of lips     Esophageal reflux     Glossitis     Hematospermia     Hoarseness     Insomnia, unspecified     Mononeuritis of unspecified site     Neck pain     Neuropathy     nate feet    Other abnormal clinical finding     Other chronic pain     Other malaise and fatigue     Other specified disorders of breast     Sore throat     Unspecified essential hypertension     Urinary tract infection, site not specified        Family History   Problem Relation Age of Onset    Diabetes Mother     Diabetes Father     Heart Disease Father     Crohn's Disease Daughter     Crohn's Disease Other     Colon Cancer Neg Hx     Colon Polyps Neg Hx     Liver Cancer Neg Hx     Stomach Cancer Neg Hx     Rectal Cancer Neg Hx     Esophageal Cancer Neg Hx     Liver Disease Neg Hx        Social History     Tobacco Use    Smoking status: Never Smoker    Smokeless tobacco: Never Used   Substance Use Topics    Alcohol use: No           REVIEW OF SYSTEMS:  all other systems reviewed and are negative  Review of Systems   Constitutional: Negative for chills and fever.    HENT: Negative for congestion, dental problem, ear discharge, ear pain, facial swelling, hearing loss, postnasal drip, rhinorrhea, sinus pressure, sinus pain, sore throat, tinnitus, trouble swallowing and voice change. Eyes: Negative for photophobia and pain. Comments:     PHYSICAL EXAM:    /70   Ht 5' 9\" (1.753 m)   Wt 216 lb (98 kg)   BMI 31.90 kg/m²   Body mass index is 31.9 kg/m². General Appearance: well developed  and well nourished  Head/ Face: normocephalic and atraumatic  Vocal Quality: good/ normal  Ears: Right Ear: External: external ears normal Otoscopy Ear Canal: cerumen impaction Otoscopy TM: TM's normal and TM's mobile Left Ear: External: external ears normal Otoscopy Ear Canal: cerumen impaction Otoscopy TM: TM's normal and TM's mobile  Hearing: grossly intact  Nose: nares normal and septum midline  Neck: supple, adenopathy none palpable and mass No  Thyroid: normal and nodules No   The patient is noted to have a 2 mm smooth lesion to the right anterior earlobe that is felt to be fairly superficial to palpation. A skin cancer cannot be ruled out specifically a basal cell carcinoma. Due to this issue, excisional surgery has been recommended here in the office under local anesthesia. Assessment & Plan:    Problem List Items Addressed This Visit     Earlobe lesion, right     Right earlobe lesion2 to 3 mm in sizerule out basal cell carcinoma  Plan: The risks, benefits, and options were discussed with the patient. He is agreeable to undergo excisional surgery under local anesthesia here at the office in the next week or so. He is agreeable to accept all risks and proceed as planned. Bilateral impacted cerumen - Primary     Bilateral cerumen impactionwill clean today with microscopic guidance         Relevant Orders    49434 - DE REMOVE IMPACTED EAR WAX (Completed)          Orders Placed This Encounter   Procedures    42473 - DE REMOVE IMPACTED EAR WAX     With microscopic assistance, the cerumen impaction was removed successfully bilaterally with assistance of alligator graspers.   The TMs were well visualized bilaterally and demonstrated no evidence of infection or perforation. The patient tolerated the procedure nicely with no complications. No orders of the defined types were placed in this encounter. Electronically signed by Porsha Pozo PA-C on 3/10/21 at 3:32 PM CST          Please note that this chart was generated using dragon dictation software. Although every effort was made to ensure the accuracy of this automated transcription, some errors in transcription may have occurred.

## 2021-03-15 DIAGNOSIS — I10 ESSENTIAL HYPERTENSION: ICD-10-CM

## 2021-03-15 DIAGNOSIS — E78.2 MIXED HYPERLIPIDEMIA: ICD-10-CM

## 2021-03-15 DIAGNOSIS — R20.2 PARESTHESIAS: ICD-10-CM

## 2021-03-15 DIAGNOSIS — M19.90 ARTHRITIS: ICD-10-CM

## 2021-03-15 DIAGNOSIS — Z76.0 MEDICATION REFILL: ICD-10-CM

## 2021-03-15 LAB
ALBUMIN SERPL-MCNC: 4.2 G/DL (ref 3.5–5.2)
ALP BLD-CCNC: 114 U/L (ref 40–130)
ALT SERPL-CCNC: 23 U/L (ref 5–41)
ANION GAP SERPL CALCULATED.3IONS-SCNC: 11 MMOL/L (ref 7–19)
AST SERPL-CCNC: 24 U/L (ref 5–40)
BASOPHILS ABSOLUTE: 0.1 K/UL (ref 0–0.2)
BASOPHILS RELATIVE PERCENT: 1.1 % (ref 0–1)
BILIRUB SERPL-MCNC: 0.6 MG/DL (ref 0.2–1.2)
BUN BLDV-MCNC: 23 MG/DL (ref 8–23)
CALCIUM SERPL-MCNC: 9.3 MG/DL (ref 8.8–10.2)
CHLORIDE BLD-SCNC: 105 MMOL/L (ref 98–111)
CHOLESTEROL, FASTING: 170 MG/DL (ref 160–199)
CO2: 27 MMOL/L (ref 22–29)
CREAT SERPL-MCNC: 0.9 MG/DL (ref 0.5–1.2)
CREATININE URINE: 278.6 MG/DL (ref 4.2–622)
EOSINOPHILS ABSOLUTE: 0.4 K/UL (ref 0–0.6)
EOSINOPHILS RELATIVE PERCENT: 6.7 % (ref 0–5)
GFR AFRICAN AMERICAN: >59
GFR NON-AFRICAN AMERICAN: >60
GLUCOSE BLD-MCNC: 90 MG/DL (ref 74–109)
HCT VFR BLD CALC: 47.4 % (ref 42–52)
HDLC SERPL-MCNC: 42 MG/DL (ref 55–121)
HEMOGLOBIN: 16.3 G/DL (ref 14–18)
IMMATURE GRANULOCYTES #: 0 K/UL
LDL CHOLESTEROL CALCULATED: 110 MG/DL
LYMPHOCYTES ABSOLUTE: 2 K/UL (ref 1.1–4.5)
LYMPHOCYTES RELATIVE PERCENT: 30.8 % (ref 20–40)
MCH RBC QN AUTO: 31.9 PG (ref 27–31)
MCHC RBC AUTO-ENTMCNC: 34.4 G/DL (ref 33–37)
MCV RBC AUTO: 92.8 FL (ref 80–94)
MICROALBUMIN UR-MCNC: <1.2 MG/DL (ref 0–19)
MICROALBUMIN/CREAT UR-RTO: NORMAL MG/G
MONOCYTES ABSOLUTE: 0.4 K/UL (ref 0–0.9)
MONOCYTES RELATIVE PERCENT: 6.4 % (ref 0–10)
NEUTROPHILS ABSOLUTE: 3.6 K/UL (ref 1.5–7.5)
NEUTROPHILS RELATIVE PERCENT: 54.7 % (ref 50–65)
PDW BLD-RTO: 12.2 % (ref 11.5–14.5)
PLATELET # BLD: 206 K/UL (ref 130–400)
PMV BLD AUTO: 10 FL (ref 9.4–12.4)
POTASSIUM SERPL-SCNC: 4 MMOL/L (ref 3.5–5)
RBC # BLD: 5.11 M/UL (ref 4.7–6.1)
RHEUMATOID FACTOR: <10 IU/ML
SEDIMENTATION RATE, ERYTHROCYTE: 3 MM/HR (ref 0–15)
SODIUM BLD-SCNC: 143 MMOL/L (ref 136–145)
TOTAL PROTEIN: 7 G/DL (ref 6.6–8.7)
TRIGLYCERIDE, FASTING: 90 MG/DL (ref 0–149)
WBC # BLD: 6.6 K/UL (ref 4.8–10.8)

## 2021-03-15 RX ORDER — OMEPRAZOLE 40 MG/1
CAPSULE, DELAYED RELEASE ORAL
Qty: 180 CAPSULE | Refills: 3 | Status: SHIPPED | OUTPATIENT
Start: 2021-03-15 | End: 2022-03-01 | Stop reason: SDUPTHER

## 2021-03-16 ENCOUNTER — OFFICE VISIT (OUTPATIENT)
Dept: GASTROENTEROLOGY | Age: 61
End: 2021-03-16
Payer: COMMERCIAL

## 2021-03-16 VITALS
OXYGEN SATURATION: 97 % | DIASTOLIC BLOOD PRESSURE: 58 MMHG | HEIGHT: 69 IN | SYSTOLIC BLOOD PRESSURE: 110 MMHG | WEIGHT: 218.6 LBS | HEART RATE: 71 BPM | BODY MASS INDEX: 32.38 KG/M2

## 2021-03-16 DIAGNOSIS — Z86.010 HX OF COLONIC POLYPS: Primary | ICD-10-CM

## 2021-03-16 DIAGNOSIS — K64.8 INTERNAL HEMORRHOIDS: ICD-10-CM

## 2021-03-16 PROCEDURE — 99213 OFFICE O/P EST LOW 20 MIN: CPT | Performed by: NURSE PRACTITIONER

## 2021-03-16 ASSESSMENT — ENCOUNTER SYMPTOMS
VOMITING: 0
BLOOD IN STOOL: 0
CHOKING: 0
SHORTNESS OF BREATH: 0
CONSTIPATION: 0
ABDOMINAL PAIN: 0
NAUSEA: 0
ABDOMINAL DISTENTION: 0
TROUBLE SWALLOWING: 0
DIARRHEA: 0
COUGH: 0
RECTAL PAIN: 0
ANAL BLEEDING: 1

## 2021-03-16 NOTE — PROGRESS NOTES
Subjective:     Patient ID: Bacilio Daniels is a 62 y.o. male  PCP: MARTIN Corona  Referring Provider: MARTIN Holden    HPI  Patient presents to the office today with the following complaints: Colonoscopy      Pt seen today for colonoscopy. He also has c/o rectal bleeding. He reports intermittent BRBPR on toilet tissue. He relates this to hemorrhoids. Denies any changes in bowel habits, abdominal pain, constipation and diarrhea. Last EGD 2019 - dilation, esophagitis  Last Colonoscopy 2014 - HP, 5 year recall  Denies any family history of colon cancer or colon polyps    Assessment:     1. Hx of colonic polyps    2. Internal hemorrhoids            Plan:   - Schedule colonoscopy  Instruct on bowel prep. Nothing to eat or drink after midnight the day of the exam.  Unable to drive for 24 hours after the procedure. No aspirin or nonsteroidal anti-inflammatories for 5 days before procedure. I have discussed the benefits, alternatives, and risks (including bleeding, perforation and death)  for pursuing Endoscopy (EGD/Colonscopy/EUS/ERCP) with the patient and they are willing to continue. We also discussed the need for anesthesia, IV access, proper dietary changes, medication changes if necessary, and need for bowel prep (if ordered) prior to their Endoscopic procedure. They are aware they must have someone accompany them to their scheduled procedure to drive them home - they agree to the above and are willing to continue. Orders  No orders of the defined types were placed in this encounter. Medications  No orders of the defined types were placed in this encounter.         Patient History:     Past Medical History:   Diagnosis Date    Backache, unspecified     Artis's esophagus     Bleeds easily ([address]     Diseases of lips     Esophageal reflux     Glossitis     Hematospermia     Hoarseness     Insomnia, unspecified     Mononeuritis of unspecified site     Neck pain     Neuropathy     nate feet    Other abnormal clinical finding     Other chronic pain     Other malaise and fatigue     Other specified disorders of breast     Sore throat     Unspecified essential hypertension     Urinary tract infection, site not specified        Past Surgical History:   Procedure Laterality Date   Zak Boateng @ [facility]-normal per patient    COLONOSCOPY  03/07/2014    Dr Kilgore Shadow:  HP, 5y recall   Fransisco Blake      Dr Selena Boateng @ [facility]     UPPER GASTROINTESTINAL ENDOSCOPY  02/19/2014    Dr Kilgore Shadow:  Biopsy for [facility] surveillance inadequate for evaluation.  3 year repeat    UPPER GASTROINTESTINAL ENDOSCOPY N/A 08/06/2019    Dr Jaylon Luna over wire-54 F-Gastropathy, gastric polyps, esophagitis-No recall    UPPER GASTROINTESTINAL ENDOSCOPY  08/06/2019    Dr Jaylon Luna over wire-54 F-Gastropathy, gastric polyps, esophagitis-No recall       Family History   Problem Relation Age of Onset    Diabetes Mother     Diabetes Father     Heart Disease Father     Crohn's Disease Daughter     Crohn's Disease Other     Colon Cancer Neg Hx     Colon Polyps Neg Hx     Liver Cancer Neg Hx     Stomach Cancer Neg Hx     Rectal Cancer Neg Hx     Esophageal Cancer Neg Hx     Liver Disease Neg Hx        Social History     Socioeconomic History    Marital status:      Spouse name: None    Number of children: None    Years of education: None    Highest education level: None   Occupational History    None   Social Needs    Financial resource strain: None    Food insecurity     Worry: None     Inability: None    Transportation needs     Medical: None     Non-medical: None   Tobacco Use    Smoking status: Never Smoker    Smokeless tobacco: Never Used   Substance and Sexual Activity    Alcohol use: No    Drug use: No    Sexual activity: None   Lifestyle    Physical activity     Days per week: None     Minutes per session: None    Stress: None   Relationships    Social connections     Talks on phone: None     Gets together: None     Attends Church service: None     Active member of club or organization: None     Attends meetings of clubs or organizations: None     Relationship status: None    Intimate partner violence     Fear of current or ex partner: None     Emotionally abused: None     Physically abused: None     Forced sexual activity: None   Other Topics Concern    None   Social History Narrative    None       Current Outpatient Medications   Medication Sig Dispense Refill    omeprazole (PRILOSEC) 40 MG delayed release capsule TAKE ONE CAPSULE TWO TIMES A  capsule 3    tiZANidine (ZANAFLEX) 4 MG tablet TAKE ONE TABLET BY MOUTH EVERY 6 HOURS AS NEEDED 60 tablet 0    gabapentin (NEURONTIN) 600 MG tablet TAKE ONE TABLET TWO TIMES A  tablet 0    lisinopril (PRINIVIL;ZESTRIL) 10 MG tablet TAKE 1 TABLET BY MOUTH ONE TIME A DAY 90 tablet 2    gabapentin (NEURONTIN) 100 MG capsule Take 1 capsule by mouth daily for 30 days. 30 capsule 3    terbinafine (LAMISIL) 1 % cream Apply topically 2 times daily. 28 g 0    montelukast (SINGULAIR) 10 MG tablet TAKE 1 TABLET BY MOUTH ONE TIME A DAY 90 tablet 3    Multiple Vitamins-Minerals (MULTIVITAMIN ADULT PO) Take by mouth daily      Fexofenadine HCl (ALLEGRA PO) Take by mouth daily        No current facility-administered medications for this visit. Allergies   Allergen Reactions    Iodine Rash     Skin peels, feet and hands turn red       Review of Systems   Constitutional: Negative for activity change, appetite change, fatigue, fever and unexpected weight change. HENT: Negative for trouble swallowing. Respiratory: Negative for cough, choking and shortness of breath. Cardiovascular: Negative for chest pain. Gastrointestinal: Positive for anal bleeding (BRBPR, hx hemorrhoids).  Negative for abdominal distention, abdominal pain, blood in stool, constipation, diarrhea, nausea, rectal pain and vomiting. Allergic/Immunologic: Negative for food allergies. All other systems reviewed and are negative. Objective:     BP (!) 110/58   Pulse 71   Ht 5' 9\" (1.753 m)   Wt 218 lb 9.6 oz (99.2 kg)   SpO2 97%   BMI 32.28 kg/m²     Physical Exam  Vitals signs reviewed. Constitutional:       General: He is not in acute distress. Appearance: He is well-developed. HENT:      Head: Normocephalic and atraumatic. Right Ear: External ear normal.      Left Ear: External ear normal.      Nose: Nose normal.      Comments: Mask on     Mouth/Throat:      Comments: Mask on  Eyes:      General: No scleral icterus. Right eye: No discharge. Left eye: No discharge. Conjunctiva/sclera: Conjunctivae normal.      Pupils: Pupils are equal, round, and reactive to light. Neck:      Musculoskeletal: Normal range of motion and neck supple. Cardiovascular:      Rate and Rhythm: Normal rate and regular rhythm. Heart sounds: Normal heart sounds. No murmur. Pulmonary:      Effort: Pulmonary effort is normal. No respiratory distress. Breath sounds: Normal breath sounds. No wheezing or rales. Abdominal:      General: Bowel sounds are normal. There is no distension. Palpations: Abdomen is soft. There is no mass. Tenderness: There is no abdominal tenderness. There is no guarding or rebound. Musculoskeletal: Normal range of motion. Skin:     General: Skin is warm and dry. Coloration: Skin is not pale. Neurological:      Mental Status: He is alert and oriented to person, place, and time.    Psychiatric:         Behavior: Behavior normal.

## 2021-03-16 NOTE — PATIENT INSTRUCTIONS
restrictions to diet and bowel prep instructions including laxatives. Please read these instructions one week prior to your scheduled procedure to ensure that you are prepared. If you have any questions regarding these instructions please call our office Mon through Fri from 8:00 am to 4:00 pm.     Follow prep instructions provided for bowel prep. Take all of the bowel prep as directed. If you are having problems with nausea, stop your prep for 30-45 min to allow the nausea to subside before resuming your prep. It is important to drink plenty of fluids throughout the day before taking your laxatives. This will help to protect your kidneys, prevent dehydration and maximize the effect of the bowel prep. Your diet before a colonoscopy bowel preparation is very important to ensure a successful colon exam. It is recommended to consider certain changes to your diet three to four days prior to the procedure. Remember that your bowels need to be completely empty for the exam.    What foods are good to eat? Cut down on heavy solid foods three to four days before the procedure and start introducing lighter meals to your diet. The following food suggestions are a good part of your diet before a colonoscopy bowel preparation.  Light meat that is easily digestible such as chicken (without the skin)    Potatoes without skin    Cheese    Eggs    A light meal of steamed white fish    Light clear soups    Foods and drinks to avoid  Avoid foods that contain too much fiber. Stay clear of dark colored beverages. They can stick to the walls of the digestive tract and make it difficult to differentiate from blood.  Some of these foods are:   Red meat, rice, nuts and vegetables    Milk, other milk based fluids and cream    Most fruit and puddings    Whole grain pasta    Cereals, bran and seeds    Colored beverages, especially those that are red or purple in color    Red colored Jell-O     On the day before the colonoscopy, continue to drink plenty of clear fluids. It is important   to keep yourself hydrated before the exam.     Please follow all instructions as provided for cleansing the bowel. Failure to have an adequately prepped colon may cause you to have incomplete exam with further testing required.      [email or web address]

## 2021-03-17 ENCOUNTER — HOSPITAL ENCOUNTER (OUTPATIENT)
Dept: NEUROLOGY | Age: 61
Discharge: HOME | End: 2021-03-17
Payer: COMMERCIAL

## 2021-03-17 PROCEDURE — 95886 MUSC TEST DONE W/N TEST COMP: CPT | Performed by: PSYCHIATRY & NEUROLOGY

## 2021-03-17 PROCEDURE — 95911 NRV CNDJ TEST 9-10 STUDIES: CPT | Performed by: PSYCHIATRY & NEUROLOGY

## 2021-03-17 PROCEDURE — 95886 MUSC TEST DONE W/N TEST COMP: CPT

## 2021-03-17 PROCEDURE — 95911 NRV CNDJ TEST 9-10 STUDIES: CPT

## 2021-03-18 ENCOUNTER — ANESTHESIA EVENT (OUTPATIENT)
Dept: OPERATING ROOM | Age: 61
End: 2021-03-18

## 2021-03-18 ENCOUNTER — OFFICE VISIT (OUTPATIENT)
Age: 61
End: 2021-03-18

## 2021-03-18 DIAGNOSIS — Z11.59 SCREENING FOR VIRAL DISEASE: Primary | ICD-10-CM

## 2021-03-18 PROCEDURE — 99999 PR OFFICE/OUTPT VISIT,PROCEDURE ONLY: CPT | Performed by: NURSE PRACTITIONER

## 2021-03-20 LAB — SARS-COV-2, NAA: NOT DETECTED

## 2021-03-22 ENCOUNTER — APPOINTMENT (OUTPATIENT)
Dept: OPERATING ROOM | Age: 61
End: 2021-03-22

## 2021-03-22 ENCOUNTER — HOSPITAL ENCOUNTER (OUTPATIENT)
Age: 61
Setting detail: OUTPATIENT SURGERY
Discharge: HOME | End: 2021-03-22
Attending: INTERNAL MEDICINE | Admitting: INTERNAL MEDICINE
Payer: COMMERCIAL

## 2021-03-22 ENCOUNTER — ANESTHESIA (OUTPATIENT)
Dept: OPERATING ROOM | Age: 61
End: 2021-03-22

## 2021-03-22 VITALS
OXYGEN SATURATION: 97 % | SYSTOLIC BLOOD PRESSURE: 117 MMHG | HEART RATE: 72 BPM | HEIGHT: 69 IN | TEMPERATURE: 98.2 F | WEIGHT: 215 LBS | RESPIRATION RATE: 18 BRPM | BODY MASS INDEX: 31.84 KG/M2 | DIASTOLIC BLOOD PRESSURE: 67 MMHG

## 2021-03-22 VITALS — DIASTOLIC BLOOD PRESSURE: 83 MMHG | SYSTOLIC BLOOD PRESSURE: 116 MMHG | OXYGEN SATURATION: 96 %

## 2021-03-22 PROCEDURE — 45378 DIAGNOSTIC COLONOSCOPY: CPT | Performed by: INTERNAL MEDICINE

## 2021-03-22 PROCEDURE — G8907 PT DOC NO EVENTS ON DISCHARG: HCPCS

## 2021-03-22 PROCEDURE — G0105 COLORECTAL SCRN; HI RISK IND: HCPCS

## 2021-03-22 PROCEDURE — G8918 PT W/O PREOP ORDER IV AB PRO: HCPCS

## 2021-03-22 RX ORDER — LIDOCAINE HYDROCHLORIDE 10 MG/ML
INJECTION, SOLUTION INFILTRATION; PERINEURAL PRN
Status: DISCONTINUED | OUTPATIENT
Start: 2021-03-22 | End: 2021-03-22 | Stop reason: SDUPTHER

## 2021-03-22 RX ORDER — PROPOFOL 10 MG/ML
INJECTION, EMULSION INTRAVENOUS PRN
Status: DISCONTINUED | OUTPATIENT
Start: 2021-03-22 | End: 2021-03-22 | Stop reason: SDUPTHER

## 2021-03-22 RX ORDER — SODIUM CHLORIDE 9 MG/ML
INJECTION, SOLUTION INTRAVENOUS CONTINUOUS
Status: DISCONTINUED | OUTPATIENT
Start: 2021-03-22 | End: 2021-03-22 | Stop reason: HOSPADM

## 2021-03-22 RX ADMIN — PROPOFOL 200 MG: 10 INJECTION, EMULSION INTRAVENOUS at 08:00

## 2021-03-22 RX ADMIN — LIDOCAINE HYDROCHLORIDE 40 MG: 10 INJECTION, SOLUTION INFILTRATION; PERINEURAL at 08:00

## 2021-03-22 RX ADMIN — SODIUM CHLORIDE: 9 INJECTION, SOLUTION INTRAVENOUS at 07:24

## 2021-03-22 RX ADMIN — SODIUM CHLORIDE: 9 INJECTION, SOLUTION INTRAVENOUS at 07:57

## 2021-03-22 NOTE — H&P
Patient Name: Woody Hussein  : 1960  MRN: [id number]  DATE: 21    Allergies: Allergies   Allergen Reactions    Iodine Rash     Skin peels, feet and hands turn red        ENDOSCOPY  History and Physical    Procedure:    [] Diagnostic Colonoscopy       [x] Screening Colonoscopy  [] EGD      [] ERCP      [] EUS       [] Other    [x] Previous office notes/History and Physical reviewed from the patients chart. Please see EMR for further details of HPI. I have examined the patient's status immediately prior to the procedure and:      Indications/HPI:       [x] Screening              [x] History of Polyps      []Fhx of colon CA/polyps       Anesthesia:   [x] MAC [] Moderate Sedation   [] General   [] None     ROS: 12 pt review of systems was negative unless stated above    Medications:   Prior to Admission medications    Medication Sig Start Date End Date Taking? Authorizing Provider   omeprazole (PRILOSEC) 40 MG delayed release capsule TAKE ONE CAPSULE TWO TIMES A DAY 3/15/21  Yes MARTIN Cisneros   tiZANidine (ZANAFLEX) 4 MG tablet TAKE ONE TABLET BY MOUTH EVERY 6 HOURS AS NEEDED 21  Yes MARTIN Cisneros   gabapentin (NEURONTIN) 600 MG tablet TAKE ONE TABLET TWO TIMES A DAY 11/19/20 3/22/21 Yes MARTIN Cisneros   lisinopril (PRINIVIL;ZESTRIL) 10 MG tablet TAKE 1 TABLET BY MOUTH ONE TIME A DAY 20  Yes MARTIN Cisneros   gabapentin (NEURONTIN) 100 MG capsule Take 1 capsule by mouth daily for 30 days. 8/20/20 3/22/21 Yes MARTIN Cisneros   terbinafine (LAMISIL) 1 % cream Apply topically 2 times daily.  20  Yes MARTIN Cisneros   montelukast (SINGULAIR) 10 MG tablet TAKE 1 TABLET BY MOUTH ONE TIME A DAY 20  Yes MARTIN Cisneros   Multiple Vitamins-Minerals (MULTIVITAMIN ADULT PO) Take by mouth daily   Yes Historical Provider, MD   Fexofenadine HCl (ALLEGRA PO) Take by mouth daily    Yes Historical Provider, MD       Past Medical History:  Past Medical History: Diagnosis Date    Backache, unspecified     Artis's esophagus     Bleeds easily ([address]     Diseases of lips     Esophageal reflux     Glossitis     Hematospermia     Hoarseness     Insomnia, unspecified     Mononeuritis of unspecified site     Neck pain     Neuropathy     nate feet    Other abnormal clinical finding     Other chronic pain     Other malaise and fatigue     Other specified disorders of breast     Sore throat     Unspecified essential hypertension     Urinary tract infection, site not specified        Past Surgical History:  Past Surgical History:   Procedure Laterality Date   Shania Nails @ [facility]-normal per patient    COLONOSCOPY  03/07/2014    Dr Danielito Menendez:  HP, 5y recall   Teretha Favor      Dr Kanchan Nails @ [facility]     UPPER GASTROINTESTINAL ENDOSCOPY  02/19/2014    Dr Danielito Menendez:  Biopsy for [facility] surveillance inadequate for evaluation. 3 year repeat    UPPER GASTROINTESTINAL ENDOSCOPY N/A 08/06/2019    Dr Felicia Mccallum over wire-54 F-Gastropathy, gastric polyps, esophagitis-No recall    UPPER GASTROINTESTINAL ENDOSCOPY  08/06/2019    Dr Felicia Mccallum over wire-[address] F-Gastropathy, gastric polyps, esophagitis-No recall       Social History:  Social History     Tobacco Use    Smoking status: Never Smoker    Smokeless tobacco: Never Used   Substance Use Topics    Alcohol use: No    Drug use: No       Vital Signs:   Vitals:    03/22/21 0718   BP: 124/73   Pulse: 63   Resp: 18   Temp: 98.2 °F (36.8 °C)   SpO2: 96%        Physical Exam:  Cardiac:  [x]WNL  []Comments:  Pulmonary:  [x]WNL   []Comments:  Neuro/Mental Status:  [x]WNL  []Comments:  Abdominal:  [x]WNL    []Comments:  Other:   []WNL  []Comments:    Informed Consent:  The risks and benefits of the procedure have been discussed with either the patient or if they cannot consent, their representative.     Assessment:  Patient examined and appropriate for planned sedation and procedure. Plan:  Proceed with planned sedation and procedure as above. Jing Lehman am scribing for and in the presence of Dr. Val Zapata MD.  Electronically signed by Nabeel Robert RN on 3/22/2021 at 7:46 AM    I personally performed the services described in this documentation as scribed by [place], and it appears accurate and complete.      Val Zapata MD  3/22/2021

## 2021-03-22 NOTE — ANESTHESIA PRE PROCEDURE
Department of Anesthesiology  Preprocedure Note       Name:  Dolores Boland   Age:  62 y.o.  :  1960                                          MRN:  [id number]         Date:  3/22/2021      Surgeon: Maru Graves):  Martín Yancey MD    Procedure: Procedure(s):  COLORECTAL CANCER SCREENING, NOT HIGH RISK    Medications prior to admission:   Prior to Admission medications    Medication Sig Start Date End Date Taking? Authorizing Provider   omeprazole (PRILOSEC) 40 MG delayed release capsule TAKE ONE CAPSULE TWO TIMES A DAY 3/15/21  Yes MARITN Paz   tiZANidine (ZANAFLEX) 4 MG tablet TAKE ONE TABLET BY MOUTH EVERY 6 HOURS AS NEEDED 21  Yes MARTIN Paz   gabapentin (NEURONTIN) 600 MG tablet TAKE ONE TABLET TWO TIMES A DAY 11/19/20 3/22/21 Yes MARTIN Paz   lisinopril (PRINIVIL;ZESTRIL) 10 MG tablet TAKE 1 TABLET BY MOUTH ONE TIME A DAY 20  Yes MARTIN Paz   gabapentin (NEURONTIN) 100 MG capsule Take 1 capsule by mouth daily for 30 days. 8/20/20 3/22/21 Yes MARTIN Paz   terbinafine (LAMISIL) 1 % cream Apply topically 2 times daily. 20  Yes MARTIN Paz   montelukast (SINGULAIR) 10 MG tablet TAKE 1 TABLET BY MOUTH ONE TIME A DAY 20  Yes MARTIN Paz   Multiple Vitamins-Minerals (MULTIVITAMIN ADULT PO) Take by mouth daily   Yes Historical Provider, MD   Fexofenadine HCl (ALLEGRA PO) Take by mouth daily    Yes Historical Provider, MD       Current medications:    Current Facility-Administered Medications   Medication Dose Route Frequency Provider Last Rate Last Admin    0.9 % sodium chloride infusion   Intravenous Continuous Martín Yancey MD        0.9 % sodium chloride infusion   Intravenous Continuous Martín Yancey  mL/hr at 21 0724 New Bag at 21 0724       Allergies:     Allergies   Allergen Reactions    Iodine Rash     Skin peels, feet and hands turn red       Problem List:    Patient Active Problem List   Diagnosis Code    GERD (gastroesophageal reflux disease) K21.9    Artis's esophagus K22.70    Nausea R11.0    Internal hemorrhoids K64.8    History of colon polyps Z86.010    Earlobe lesion, right H61.91    Bilateral impacted cerumen H61.23       Past Medical History:        Diagnosis Date    Backache, unspecified     Artis's esophagus     Bleeds easily (HCC)     Diseases of lips     Esophageal reflux     Glossitis     Hematospermia     Hoarseness     Insomnia, unspecified     Mononeuritis of unspecified site     Neck pain     Neuropathy     nate feet    Other abnormal clinical finding     Other chronic pain     Other malaise and fatigue     Other specified disorders of breast     Sore throat     Unspecified essential hypertension     Urinary tract infection, site not specified        Past Surgical History:        Procedure Laterality Date    APPENDECTOMY      CHOLECYSTECTOMY      COLONOSCOPY      Dr Tyler Collazo @ [facility]-normal per patient    COLONOSCOPY  03/07/2014    Dr Alize Moreno:  HP, 5y recall   Geoffry Goldberg      Dr Tyler Collazo @ [facility]-Artis's     UPPER GASTROINTESTINAL ENDOSCOPY  02/19/2014    Dr Alize Moreno:  Biopsy for Artis's surveillance inadequate for evaluation. 3 year repeat    UPPER GASTROINTESTINAL ENDOSCOPY N/A 08/06/2019    Dr Tim Reed over wire-55 F-Gastropathy, gastric polyps, esophagitis-No recall    UPPER GASTROINTESTINAL ENDOSCOPY  08/06/2019    Dr Tim Reed over wire-55 F-Gastropathy, gastric polyps, esophagitis-No recall       Social History:    Social History     Tobacco Use    Smoking status: Never Smoker    Smokeless tobacco: Never Used   Substance Use Topics    Alcohol use:  No                                Counseling given: Not Answered      Vital Signs (Current):   Vitals:    03/22/21 0718   BP: 124/73   Pulse: 63   Resp: 18   Temp: 98.2 °F (36.8 °C)   SpO2: 96%   Weight: 215 lb (97.5 kg)   Height: 5' 9\" (1.753 m) BP Readings from Last 3 Encounters:   03/22/21 124/73   03/16/21 (!) 110/58   03/10/21 134/70       NPO Status: Time of last liquid consumption: 0300                        Time of last solid consumption: 2300                        Date of last liquid consumption: 03/22/21                        Date of last solid food consumption: 03/20/21    BMI:   Wt Readings from Last 3 Encounters:   03/22/21 215 lb (97.5 kg)   03/16/21 218 lb 9.6 oz (99.2 kg)   03/10/21 216 lb (98 kg)     Body mass index is 31.75 kg/m². CBC:   Lab Results   Component Value Date    WBC 6.6 03/15/2021    RBC 5.11 03/15/2021    HGB 16.3 03/15/2021    HCT 47.4 03/15/2021    MCV 92.8 03/15/2021    RDW 12.2 03/15/2021     03/15/2021       CMP:   Lab Results   Component Value Date     03/15/2021    K 4.0 03/15/2021     03/15/2021    CO2 27 03/15/2021    BUN 23 03/15/2021    CREATININE 0.9 03/15/2021    GFRAA >59 03/15/2021    LABGLOM >60 03/15/2021    GLUCOSE 90 03/15/2021    PROT 7.0 03/15/2021    CALCIUM 9.3 03/15/2021    BILITOT 0.6 03/15/2021    ALKPHOS 114 03/15/2021    AST 24 03/15/2021    ALT 23 03/15/2021       POC Tests: No results for input(s): POCGLU, POCNA, POCK, POCCL, POCBUN, POCHEMO, POCHCT in the last 72 hours.     Coags: No results found for: PROTIME, INR, APTT    HCG (If Applicable): No results found for: PREGTESTUR, PREGSERUM, HCG, HCGQUANT     ABGs: No results found for: PHART, PO2ART, XMA1GRI, COL2SMN, BEART, J5MJLGGJ     Type & Screen (If Applicable):  No results found for: LABABO, LABRH    Drug/Infectious Status (If Applicable):  No results found for: HIV, HEPCAB    COVID-19 Screening (If Applicable):   Lab Results   Component Value Date    COVID19 NOT DETECTED 03/18/2021           Anesthesia Evaluation  Patient summary reviewed and Nursing notes reviewed no history of anesthetic complications:   Airway: Mallampati: II  TM distance: >3 FB   Neck ROM: full  Mouth opening: < 3 FB Dental: normal exam         Pulmonary:normal exam                              ROS comment: hoarseness   Cardiovascular:    (+) hypertension:,          Beta Blocker:  Not on Beta Blocker         Neuro/Psych:                ROS comment: Neurontin  GI/Hepatic/Renal:   (+) GERD: well controlled, bowel prep,          ROS comment: Artis's esophagus. Endo/Other: Negative Endo/Other ROS                    Abdominal:           Vascular: negative vascular ROS. Anesthesia Plan      general and TIVA     ASA 2       Induction: intravenous. Anesthetic plan and risks discussed with patient.                       MARTIN Esparza - CRNA   3/22/2021

## 2021-03-22 NOTE — ANESTHESIA POSTPROCEDURE EVALUATION
Department of Anesthesiology  Postprocedure Note    Patient: Marcelo Gonzalez  MRN: [id number]  YOB: 1960  Date of evaluation: 3/22/2021  Time:  8:13 AM     Procedure Summary     Date: 03/22/21 Room / Location: [facility] ENDO 02 / [address] [place] [address]    Anesthesia Start: 7364 Anesthesia Stop:     Procedure: [address], NOT HIGH RISK (N/A Abdomen) Diagnosis: (HX AP)    Surgeons: Sravan Parson MD Responsible Provider: MARTIN Torres CRNA    Anesthesia Type: general, TIVA ASA Status: 2          Anesthesia Type: No value filed. Lis Phase I:      Lis Phase II:      Last vitals: Reviewed and per EMR flowsheets.        Anesthesia Post Evaluation    Patient location during evaluation: bedside  Patient participation: complete - patient participated  Level of consciousness: sleepy but conscious  Pain score: 0  Airway patency: patent  Nausea & Vomiting: no nausea and no vomiting  Complications: no  Cardiovascular status: hemodynamically stable and blood pressure returned to baseline  Respiratory status: acceptable  Hydration status: stable

## 2021-03-22 NOTE — OP NOTE
Patient: Gaynell Goodell : 1960  [facility] Rec#: [id number] Acc#: [id number]   Primary Care Provider MARTIN Morrison    Date of Procedure:  3/22/2021    Endoscopist: Cedric Martell MD    Referring Provider: MARTIN Morrison, MARTIN Mckeon    Operation Performed: Colonoscopy     Indications: Screening- hx of polyps    Anesthesia:  Sedation was administered by anesthesia who monitored the patient during the procedure. I met with Gaynell Goodell prior to procedure. We discussed the procedure itself, and I have discussed the risks of endoscopy (including-- but not limited to-- pain, discomfort, bleeding potentially requiring second endoscopic procedure and/or blood transfusion, organ perforation requiring operative repair, damage to organs near the colon, infection, aspiration, cardiopulmonary/allergic reaction), benefits, indications to endoscopy. Additionally, we discussed options other than colonoscopy. The patient expressed understanding. All questions answered. The patient decided to proceed with the procedure. Signed informed consent was placed on the chart. Blood Loss: minimal    Withdrawal time: > 6 min  Bowel Prep: adequate     Complications: no immediate complications    DESCRIPTION OF PROCEDURE:     A time out was performed. After written informed consent was obtained, the patient was placed in the left lateral position. The perianal area was inspected, and a digital rectal exam was performed. A rectal exam was performed: normal tone, no palpable lesions. At this point, a forward viewing Olympus colonoscope was inserted into the anus and carefully advanced to the cecum. The cecum was identified by the ileocecal valve and the appendiceal orifice. The colonoscope was then slowly withdrawn with careful inspection of the mucosa in a linear and circumferential fashion. The scope was retroflexed in the rectum.  Suction was utilized during the procedure to remove as much air as possible from the bowel. The colonoscope was removed from the patient, and the procedure was terminated. Findings are listed below. Findings: The mucosa appeared normal throughout the entire examined colon. Retroflexion in the rectum was normal and revealed no further abnormalities. Recommendations:  1. Repeat colonoscopy: 5 years, due to hx of polyps     Findings and recommendations were discussed w/ the patient. A copy of the images was provided. Pawan Welch am scribing for and in the presence of Dr. Trent Fung MD.  Electronically signed by Kenrick Kidd RN on 3/22/2021 at 7:47 AM    I personally performed the services described in this documentation as scribed by Katiana Urrutia, and it appears accurate and complete.      Trent Fung MD  3/22/2021

## 2021-03-23 ENCOUNTER — PROCEDURE VISIT (OUTPATIENT)
Dept: ENT CLINIC | Age: 61
End: 2021-03-23
Payer: COMMERCIAL

## 2021-03-23 VITALS — BODY MASS INDEX: 31.84 KG/M2 | HEIGHT: 69 IN | WEIGHT: 215 LBS

## 2021-03-23 DIAGNOSIS — H61.91 EARLOBE LESION, RIGHT: Primary | ICD-10-CM

## 2021-03-23 PROCEDURE — 11442 EXC FACE-MM B9+MARG 1.1-2 CM: CPT | Performed by: PHYSICIAN ASSISTANT

## 2021-03-23 NOTE — PROGRESS NOTES
[facility] OTOLARYNGOLOGY/ENT        PREOP NOTE   Duane is a pleasant 28-year-old  male that seen in the office about a week ago due to bilateral cerumen impaction of the ears as well as a lesion on the right earlobe. After the risks, benefits, and options were discussed with the patient. He was agreeable for excisional surgery under local anesthesia here at the office. PROCEDURE NOTE:  After informed consent was obtained, the ear was anesthetized with 3 cc of lidocaine with epinephrine. The lesion was prepped in Betadine. An elliptical incision was made around the lesion that was located in the anterior earlobe. A full-thickness biopsy was obtained utilizing Adson pickups and Metzenbaums. The specimen was placed in formalin for pathological analysis. The size of the lesion measured 2 cm. Direct pressure was held for bleeding, which was minimal.  The wound was closed with interrupted 4-0 Vicryl x1 in the central portion. A running subcuticular layer was ran for the closure of the skin. Dermabond was applied to the wound after complete closure. The patient tolerated the procedure quite nicely with no complications. Wound care instructions were discussed with the patient. He is to follow-up in the clinic in 2 weeks for reevaluation. He is advised to take over-the-counter Tylenol or ibuprofen for the postprocedural pain. He was reminded to call if he has any further questions or problems.       Electronically signed by Josee Trinidad PA-C on 3/23/21 at 4:21 PM CDT

## 2021-04-15 RX ORDER — TIZANIDINE 4 MG/1
TABLET ORAL
Qty: 60 TABLET | Refills: 0 | Status: SHIPPED | OUTPATIENT
Start: 2021-04-15 | End: 2021-07-01

## 2021-05-03 DIAGNOSIS — I10 ESSENTIAL HYPERTENSION: ICD-10-CM

## 2021-05-03 DIAGNOSIS — M54.16 LUMBAR RADICULAR PAIN: ICD-10-CM

## 2021-05-03 RX ORDER — LISINOPRIL 10 MG/1
TABLET ORAL
Qty: 90 TABLET | Refills: 2 | Status: SHIPPED | OUTPATIENT
Start: 2021-05-03 | End: 2021-08-27 | Stop reason: SDUPTHER

## 2021-05-03 NOTE — TELEPHONE ENCOUNTER
Ilana Charles called to request a refill on his medication. Last office visit : 3/2/2021   Next office visit : Visit date not found     Last UDS:   Amphetamine Screen, Urine   Date Value Ref Range Status   02/20/2020 neg  Final     Barbiturate Screen, Urine   Date Value Ref Range Status   02/20/2020 neg  Final     Benzodiazepine Screen, Urine   Date Value Ref Range Status   02/20/2020 neg  Final     Buprenorphine Urine   Date Value Ref Range Status   02/20/2020 neg  Final     Cocaine Metabolite Screen, Urine   Date Value Ref Range Status   02/20/2020 neg  Final     Gabapentin Screen, Urine   Date Value Ref Range Status   02/20/2020 neg  Final     MDMA, Urine   Date Value Ref Range Status   02/20/2020 neg  Final     Methamphetamine, Urine   Date Value Ref Range Status   02/20/2020 neg  Final     Opiate Scrn, Ur   Date Value Ref Range Status   02/20/2020 neg  Final     Oxycodone Screen, Ur   Date Value Ref Range Status   02/20/2020 neg  Final     PCP Screen, Urine   Date Value Ref Range Status   02/20/2020 neg  Final     Propoxyphene Screen, Urine   Date Value Ref Range Status   02/20/2020 neg  Final     THC Screen, Urine   Date Value Ref Range Status   02/20/2020 neg  Final     Tricyclic Antidepressants, Urine   Date Value Ref Range Status   02/20/2020 neg  Final       Last Marcie Gold: 5/3/2021  Medication Contract: 8/20/2020  Last Fill: 11/19/2020    Requested Prescriptions     Pending Prescriptions Disp Refills    gabapentin (NEURONTIN) 600 MG tablet [Pharmacy Med Name: GABAPENTIN 600MG TABS] 180 tablet 0     Sig: TAKE 1 TABLET BY MOUTH 2 TIMES A DAY         Please approve or refuse this medication.    Edd Valdovinos

## 2021-05-04 RX ORDER — GABAPENTIN 600 MG/1
TABLET ORAL
Qty: 180 TABLET | Refills: 0 | Status: SHIPPED | OUTPATIENT
Start: 2021-05-04 | End: 2021-08-20

## 2021-05-17 ENCOUNTER — NURSE TRIAGE (OUTPATIENT)
Dept: OTHER | Facility: CLINIC | Age: 61
End: 2021-05-17

## 2021-05-17 NOTE — TELEPHONE ENCOUNTER
Reason for Disposition   MODERATE pain (e.g., interferes with normal activities) and present > 3 days    Answer Assessment - Initial Assessment Questions  1. ONSET: \"When did the pain start? \"      States is has been ongoing for a couple of months but worsened about 4 days ago    2. LOCATION: \"Where is the pain located? \"      Right wrist    3. PAIN: \"How bad is the pain? \" (Scale 1-10; or mild, moderate, severe)    - MILD (1-3): doesn't interfere with normal activities    - MODERATE (4-7): interferes with normal activities (e.g., work or school) or awakens from sleep    - SEVERE (8-10): excruciating pain, unable to use hand at all      States with movement 8-9/10 no movement 5/10    4. 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 CAUSE: \"What do you think is causing the pain? \"      States carpel tunnel in both hands but this pain has worsened    6. AGGRAVATING FACTORS: \"What makes the pain worse? \" (e.g., using computer)      Movement    7. OTHER SYMPTOMS: \"Do you have any other symptoms? \" (e.g., neck pain, swelling, rash, numbness, fever)      States it radiates up forearm and into shoulder and neck     8. PREGNANCY: \"Is there any chance you are pregnant? \" \"When was your last menstrual period? \"      n/a    Protocols used: HAND AND WRIST PAIN-ADULT-OH    Received call from Umm Pandey at pre-service center [facility] with Red Flag Complaint. Brief description of triage: see above    Triage indicates for patient to be seen in the next 3 days. Care advice provided, patient verbalizes understanding; denies any other questions or concerns; instructed to call back for any new or worsening symptoms. Writer provided warm transfer to Azalia at [facility] for appointment scheduling. Attention Provider: Thank you for allowing me to participate in the care of your patient.   The patient was connected to triage in response to information

## 2021-05-19 ENCOUNTER — OFFICE VISIT (OUTPATIENT)
Dept: PRIMARY CARE CLINIC | Age: 61
End: 2021-05-19
Payer: COMMERCIAL

## 2021-05-19 ENCOUNTER — TELEPHONE (OUTPATIENT)
Dept: PRIMARY CARE CLINIC | Age: 61
End: 2021-05-19

## 2021-05-19 VITALS
BODY MASS INDEX: 31.84 KG/M2 | HEIGHT: 69 IN | DIASTOLIC BLOOD PRESSURE: 76 MMHG | TEMPERATURE: 98.2 F | SYSTOLIC BLOOD PRESSURE: 116 MMHG | WEIGHT: 215 LBS | OXYGEN SATURATION: 98 % | HEART RATE: 71 BPM | RESPIRATION RATE: 18 BRPM

## 2021-05-19 DIAGNOSIS — M54.12 CERVICAL RADICULOPATHY: Primary | ICD-10-CM

## 2021-05-19 DIAGNOSIS — G56.01 RIGHT CARPAL TUNNEL SYNDROME: ICD-10-CM

## 2021-05-19 DIAGNOSIS — F41.9 ANXIETY: Primary | ICD-10-CM

## 2021-05-19 DIAGNOSIS — M25.531 WRIST PAIN, RIGHT: ICD-10-CM

## 2021-05-19 DIAGNOSIS — M19.90 ARTHRITIS: ICD-10-CM

## 2021-05-19 DIAGNOSIS — M50.30 DDD (DEGENERATIVE DISC DISEASE), CERVICAL: ICD-10-CM

## 2021-05-19 PROCEDURE — 20526 THER INJECTION CARP TUNNEL: CPT | Performed by: NURSE PRACTITIONER

## 2021-05-19 PROCEDURE — 99213 OFFICE O/P EST LOW 20 MIN: CPT | Performed by: NURSE PRACTITIONER

## 2021-05-19 RX ORDER — MELOXICAM 7.5 MG/1
7.5 TABLET ORAL DAILY
Qty: 90 TABLET | Refills: 1 | Status: SHIPPED | OUTPATIENT
Start: 2021-05-19 | End: 2021-08-27 | Stop reason: SDUPTHER

## 2021-05-19 RX ORDER — LIDOCAINE HYDROCHLORIDE 10 MG/ML
1 INJECTION, SOLUTION INFILTRATION; PERINEURAL ONCE
Status: COMPLETED | OUTPATIENT
Start: 2021-05-19 | End: 2021-05-19

## 2021-05-19 RX ORDER — TRIAMCINOLONE ACETONIDE 40 MG/ML
40 INJECTION, SUSPENSION INTRA-ARTICULAR; INTRAMUSCULAR ONCE
Status: COMPLETED | OUTPATIENT
Start: 2021-05-19 | End: 2021-05-19

## 2021-05-19 RX ADMIN — TRIAMCINOLONE ACETONIDE 40 MG: 40 INJECTION, SUSPENSION INTRA-ARTICULAR; INTRAMUSCULAR at 15:35

## 2021-05-19 RX ADMIN — LIDOCAINE HYDROCHLORIDE 1 ML: 10 INJECTION, SOLUTION INFILTRATION; PERINEURAL at 15:35

## 2021-05-19 SDOH — ECONOMIC STABILITY: FOOD INSECURITY: WITHIN THE PAST 12 MONTHS, YOU WORRIED THAT YOUR FOOD WOULD RUN OUT BEFORE YOU GOT MONEY TO BUY MORE.: OFTEN TRUE

## 2021-05-19 ASSESSMENT — SOCIAL DETERMINANTS OF HEALTH (SDOH): HOW HARD IS IT FOR YOU TO PAY FOR THE VERY BASICS LIKE FOOD, HOUSING, MEDICAL CARE, AND HEATING?: NOT HARD AT ALL

## 2021-05-19 ASSESSMENT — PATIENT HEALTH QUESTIONNAIRE - PHQ9
SUM OF ALL RESPONSES TO PHQ QUESTIONS 1-9: 0

## 2021-05-19 NOTE — TELEPHONE ENCOUNTER
I spoke with pt and gave him his appt for mri c-spine on [email or web address] but to arrive at 1100 and to bring insurance card and id with him. Pt stated that if he needs to change the appt he will call the mri center and call us to let us know of the changed. Pt also wanted to see if Martínez Venegas could call him in something to calm him down before the mri is to be done. Per Martínez Venegas ok to give the pt Valium 5 mg to take one hour prior to having the test done and not to drive after taking it. If pt does reschedule his appt he is to call us and we will send in the medication for that.

## 2021-05-19 NOTE — PROGRESS NOTES
Noa-w/dilation over wire-55 F-Gastropathy, gastric polyps, esophagitis-No recall    UPPER GASTROINTESTINAL ENDOSCOPY  08/06/2019    Dr Persaud Balloon over wire-55 F-Gastropathy, gastric polyps, esophagitis-No recall       Social History     Tobacco Use    Smoking status: Never Smoker    Smokeless tobacco: Never Used   Substance Use Topics    Alcohol use: No        Current Outpatient Medications   Medication Sig Dispense Refill    meloxicam (MOBIC) 7.5 MG tablet Take 1 tablet by mouth daily 90 tablet 1    gabapentin (NEURONTIN) 600 MG tablet TAKE 1 TABLET BY MOUTH 2 TIMES A  tablet 0    lisinopril (PRINIVIL;ZESTRIL) 10 MG tablet TAKE ONE TABLET ONE TIME DAILY 90 tablet 2    tiZANidine (ZANAFLEX) 4 MG tablet TAKE ONE TABLET BY MOUTH EVERY 6 HOURS AS NEEDED MUST BE SEEN BEFORE FURTHER REFILLS 60 tablet 0    omeprazole (PRILOSEC) 40 MG delayed release capsule TAKE ONE CAPSULE TWO TIMES A  capsule 3    terbinafine (LAMISIL) 1 % cream Apply topically 2 times daily. 28 g 0    montelukast (SINGULAIR) 10 MG tablet TAKE 1 TABLET BY MOUTH ONE TIME A DAY 90 tablet 3    Multiple Vitamins-Minerals (MULTIVITAMIN ADULT PO) Take by mouth daily      Fexofenadine HCl (ALLEGRA PO) Take by mouth daily       [START ON 6/7/2021] diazePAM (VALIUM) 5 MG tablet Take 1 tablet by mouth as needed for Anxiety (to take one hour prior to having mri done do not drive after taking medication) for up to 1 day. 1 tablet 0    gabapentin (NEURONTIN) 100 MG capsule Take 1 capsule by mouth daily for 30 days. 30 capsule 3     No current facility-administered medications for this visit.        Allergies   Allergen Reactions    Iodine Rash     Skin peels, feet and hands turn red       Family History   Problem Relation Age of Onset    Diabetes Mother     Diabetes Father     Heart Disease Father     Crohn's Disease Daughter     Crohn's Disease Other     Colon Cancer Neg Hx     Colon Polyps Neg Hx     Liver Cancer Neg Hx     Stomach Cancer Neg Hx     Rectal Cancer Neg Hx     Esophageal Cancer Neg Hx     Liver Disease Neg Hx          Subjective:      Review of Systems   Constitutional: Negative. HENT: Negative. Eyes: Negative. Respiratory: Negative. Cardiovascular: Negative. Gastrointestinal: Negative. Endocrine: Negative. Genitourinary: Negative. Musculoskeletal: Positive for arthralgias (bilateral wrist pain right worse than left) and neck pain. Skin: Negative. Neurological: Positive for numbness. Hematological: Negative. Psychiatric/Behavioral: Negative. Objective:     Physical Exam  Vitals and nursing note reviewed. Constitutional:       Appearance: Normal appearance. HENT:      Head: Normocephalic and atraumatic. Right Ear: Hearing, tympanic membrane, ear canal and external ear normal.      Left Ear: Hearing, tympanic membrane, ear canal and external ear normal.      Nose: Nose normal.      Mouth/Throat:      Lips: Pink. Mouth: Mucous membranes are moist.      Pharynx: Oropharynx is clear. Eyes:      General: Lids are normal.      Extraocular Movements: Extraocular movements intact. Conjunctiva/sclera: Conjunctivae normal.      Pupils: Pupils are equal, round, and reactive to light. Neck:      Thyroid: No thyromegaly. Cardiovascular:      Rate and Rhythm: Normal rate and regular rhythm. Pulses: Normal pulses. Dorsalis pedis pulses are 2+ on the right side and 2+ on the left side. Posterior tibial pulses are 2+ on the right side and 2+ on the left side. Heart sounds: Normal heart sounds. Pulmonary:      Effort: Pulmonary effort is normal.      Breath sounds: Normal breath sounds and air entry. Abdominal:      General: Bowel sounds are normal.      Palpations: Abdomen is soft. Musculoskeletal:      Right wrist: Tenderness present. Decreased range of motion. Left wrist: Tenderness present.  Decreased range of motion. Cervical back: Neck supple. Tenderness present. Decreased range of motion. Thoracic back: No tenderness. Normal range of motion. Lumbar back: No tenderness. Normal range of motion. Lymphadenopathy:      Cervical: No cervical adenopathy. Skin:     General: Skin is warm and dry. Capillary Refill: Capillary refill takes 2 to 3 seconds. Neurological:      General: No focal deficit present. Mental Status: He is alert and oriented to person, place, and time. Mental status is at baseline. Coordination: Coordination is intact. Psychiatric:         Mood and Affect: Mood normal.         Speech: Speech normal.         Behavior: Behavior normal.         Thought Content: Thought content normal.         Cognition and Memory: Cognition and memory normal.         Judgment: Judgment normal.         /76   Pulse 71   Temp 98.2 °F (36.8 °C) (Temporal)   Resp 18   Ht 5' 9\" (1.753 m)   Wt 215 lb (97.5 kg)   SpO2 98%   BMI 31.75 kg/m²     Assessment:      Diagnosis Orders   1. Cervical radiculopathy  MRI CERVICAL SPINE WO CONTRAST   2. Wrist pain, right  triamcinolone acetonide (KENALOG-40) injection 40 mg    lidocaine 1 % injection 1 mL   3. Right carpal tunnel syndrome  Warden Juan Ramon MD, Neurosurgery, [place]   4. DDD (degenerative disc disease), cervical  MRI CERVICAL SPINE WO CONTRAST   5. Arthritis  meloxicam (MOBIC) 7.5 MG tablet       No results found for this visit on 05/19/21. Plan:     Cervical MRI due to continued radiculopathy  Will try Meloxicam for arthritic discomfort. Referral to neurosurgery for possible carpal tunnel repair    CARPAL TUNNEL INJECTION: right ([id number])  Patient was given verbal informed consent and agreed verbally and with signed consent to the risks and benefits of procedure. Risks discussed included bleeding, infection, damage to structures, and potentially other yet unlikely unforeseen consequences of those risks.  An impression was made with a pen for injection site approximately 1 cm proximal to palmar crease and medial to the palmaris longus tendon. The area was cleaned w/ betadine and alcohol swab. It was then sprayed w/ ethyl chloride and injected w/ a 25 gauge 0.75\" needle projecting the needle 45 degrees toward the base of the thumb. The needle was aspirated and no bloody return into syringe. The patient denied any jolting or shocking sensation in the patient's hand or arm during the procedure. The medicine was administered w/o issue. 1.0 cc of 1% lidocaine w/o epinephrine and 1.0 cc of 40 mg/mL kenalog was administered. A bandage was applied directly thereafter. All bleeding stopped. EBL - 0 cc. Pt was instructed on basic cleansing and rest of affected area. Pt noted some relief prior to leaving the office. Return if symptoms worsen or fail to improve. Orders Placed This Encounter   Procedures    MRI CERVICAL SPINE WO CONTRAST     Standing Status:   Future     Standing Expiration Date:   5/19/2022   Sophia Eastman MD, Neurosurgery, [place]     Referral Priority:   Routine     Referral Type:   Eval and Treat     Referral Reason:   Specialty Services Required     Referred to Provider:   Rae Trinidad MD     Requested Specialty:   Neurosurgery     Number of Visits Requested:   1       Orders Placed This Encounter   Medications    meloxicam (MOBIC) 7.5 MG tablet     Sig: Take 1 tablet by mouth daily     Dispense:  90 tablet     Refill:  1    triamcinolone acetonide (KENALOG-40) injection 40 mg    lidocaine 1 % injection 1 mL            Patient offered educational handouts and has had all questions answered. Patient voices understanding and agrees to plans along with risks and benefits of plan. Patient is instructed to continue prior meds, diet, and exercise plans as instructed. Patient agrees to follow up as instructed and sooner if needed.   Patient agrees to go to ER if condition becomes emergent. EMR Dragon/transcription disclaimer: Some of this encounter note is an electronic transcription/translation of spoken language to printed text. The electronic translation of spoken language may permit erroneous, or at times, nonsensical words or phrases to be inadvertently transcribed.  Although I have reviewed the note for such errors, some may still exist.    Electronically signed by MARTIN Walters on 5/20/2021 at 4:33 PM

## 2021-05-19 NOTE — TELEPHONE ENCOUNTER
Kelley Denise called to request a refill on his medication. Last office visit : 5/19/2021   Next office visit : Visit date not found     Last UDS:   Amphetamine Screen, Urine   Date Value Ref Range Status   02/20/2020 neg  Final     Barbiturate Screen, Urine   Date Value Ref Range Status   02/20/2020 neg  Final     Benzodiazepine Screen, Urine   Date Value Ref Range Status   02/20/2020 neg  Final     Buprenorphine Urine   Date Value Ref Range Status   02/20/2020 neg  Final     Cocaine Metabolite Screen, Urine   Date Value Ref Range Status   02/20/2020 neg  Final     Gabapentin Screen, Urine   Date Value Ref Range Status   02/20/2020 neg  Final     MDMA, Urine   Date Value Ref Range Status   02/20/2020 neg  Final     Methamphetamine, Urine   Date Value Ref Range Status   02/20/2020 neg  Final     Opiate Scrn, Ur   Date Value Ref Range Status   02/20/2020 neg  Final     Oxycodone Screen, Ur   Date Value Ref Range Status   02/20/2020 neg  Final     PCP Screen, Urine   Date Value Ref Range Status   02/20/2020 neg  Final     Propoxyphene Screen, Urine   Date Value Ref Range Status   02/20/2020 neg  Final     THC Screen, Urine   Date Value Ref Range Status   02/20/2020 neg  Final     Tricyclic Antidepressants, Urine   Date Value Ref Range Status   02/20/2020 neg  Final       Last Avis Canela: 5/3/21  Medication Contract: 8/20/20   Last Fill: n/a    Requested Prescriptions     Pending Prescriptions Disp Refills    diazePAM (VALIUM) 5 MG tablet 1 tablet 0     Sig: Take 1 tablet by mouth as needed for Anxiety (to take one hour prior to having mri done do not drive after taking medication) for up to 1 day. Please approve or refuse this medication.    Marion Gracia Monitor Summary: SR [id number], NE .16, QRS .08, QT .40  per strip from monitor room

## 2021-05-20 ENCOUNTER — TELEPHONE (OUTPATIENT)
Dept: NEUROSURGERY | Age: 61
End: 2021-05-20

## 2021-05-20 RX ORDER — DIAZEPAM 5 MG/1
5 TABLET ORAL PRN
Qty: 1 TABLET | Refills: 0 | Status: SHIPPED | OUTPATIENT
Start: 2021-06-07 | End: 2021-06-08

## 2021-05-20 ASSESSMENT — ENCOUNTER SYMPTOMS
EYES NEGATIVE: 1
GASTROINTESTINAL NEGATIVE: 1
RESPIRATORY NEGATIVE: 1

## 2021-05-20 NOTE — TELEPHONE ENCOUNTER
Valium has been sent to pharmacy already. Can you please let patient know and see how the injection from yesterday is doing?

## 2021-05-20 NOTE — TELEPHONE ENCOUNTER
1st attempt to call patient to schedule appointment, left voicemail with call back number [phone]

## 2021-05-24 ENCOUNTER — TELEPHONE (OUTPATIENT)
Dept: NEUROSURGERY | Age: 61
End: 2021-05-24

## 2021-05-27 ENCOUNTER — TELEPHONE (OUTPATIENT)
Dept: NEUROSURGERY | Age: 61
End: 2021-05-27

## 2021-05-27 NOTE — TELEPHONE ENCOUNTER
3rd attempt to call patient to schedule appointment, left voicemail with call back number [phone]

## 2021-06-25 ENCOUNTER — HOSPITAL ENCOUNTER (OUTPATIENT)
Dept: MRI IMAGING | Age: 61
Discharge: HOME | End: 2021-06-25
Payer: COMMERCIAL

## 2021-06-25 DIAGNOSIS — M54.12 CERVICAL RADICULOPATHY: ICD-10-CM

## 2021-06-25 DIAGNOSIS — M50.30 DDD (DEGENERATIVE DISC DISEASE), CERVICAL: ICD-10-CM

## 2021-06-25 PROCEDURE — 72141 MRI NECK SPINE W/O DYE: CPT

## 2021-06-28 ENCOUNTER — OFFICE VISIT (OUTPATIENT)
Dept: NEUROSURGERY | Age: 61
End: 2021-06-28
Payer: COMMERCIAL

## 2021-06-28 VITALS
DIASTOLIC BLOOD PRESSURE: 83 MMHG | HEIGHT: 69 IN | BODY MASS INDEX: 31.1 KG/M2 | HEART RATE: 73 BPM | SYSTOLIC BLOOD PRESSURE: 149 MMHG | WEIGHT: 210 LBS

## 2021-06-28 DIAGNOSIS — M25.531 RIGHT WRIST PAIN: ICD-10-CM

## 2021-06-28 DIAGNOSIS — M25.532 LEFT WRIST PAIN: ICD-10-CM

## 2021-06-28 DIAGNOSIS — M13.0 POLYARTICULAR ARTHRITIS: ICD-10-CM

## 2021-06-28 DIAGNOSIS — M54.2 NECK PAIN: Primary | ICD-10-CM

## 2021-06-28 DIAGNOSIS — G62.9 NEUROPATHY: ICD-10-CM

## 2021-06-28 PROCEDURE — 99215 OFFICE O/P EST HI 40 MIN: CPT | Performed by: NEUROLOGICAL SURGERY

## 2021-06-28 NOTE — PROGRESS NOTES
[facility] Neurosurgery  Office Visit        Chief Complaint   Patient presents with   Wilhelminia Blazing Consultation     wrist pain, neck pain. HISTORY OF PRESENT ILLNESS:      The patient is a 62 y.o. male who presents for neurosurgical evaluation of R>L wrist pain and neck pain. He is known to me for previous evaluation of lower back pain and had many longstanding chronic pain complaints. Currently he complains of pain in the base of his wrists, R>L as well as paresthesias and a burning sensation in the dorsum of his hands, again R>L. He also complains of chronic neck pain, trapezius pain and interscapular pain. His back pain complaints are essentially unchanged from his prior visit in which he had a lumbar MRI that was unremarkable. He also complains of severe ongoing muscle cramps. He denies any numbness or tingling or weakness in his fingers but does complain that his hands lock up on him. He denies waking at night with numbness in or pain in his hands but does have ongoing pain in his wrists. He is currently attempting to control his pain with gabapentin, meloxicam and tizanidine with limited relief. He has tried Lyrica in the past, and while this did somewhat help with his symptoms, he gained 15 lbs and was forced to switch back to gabapentin, which he can take without weight gain but does cause sedation which limits his ability to take it during the day. He recently received a steroid injection in his wrist for presumed carpal tunnel syndrome that did provide some relief of his wrist pain. He recently saw his PCP for his complaints and an EMG/NCS was obtained as well as a cervical MRI scan. The EMG/NCS revealed evidence of bilateral carpal tunnel syndrome (moderate) as well as a possible right C7 radiculopathy.       MEDICAL HISTORY:             Past Medical History:   Diagnosis Date    Backache, unspecified     Artis's esophagus     Bleeds easily ([address]     Diseases of lips     Esophageal reflux  Glossitis     Hematospermia     Hoarseness     Insomnia, unspecified     Mononeuritis of unspecified site     Neck pain     Neuropathy     nate feet    Other abnormal clinical finding     Other chronic pain     Other malaise and fatigue     Other specified disorders of breast     Sore throat     Unspecified essential hypertension     Urinary tract infection, site not specified        Past Surgical History:   Procedure Laterality Date   Ly Hinson      Dr Susan Herrera @ [facility]-normal per patient    COLONOSCOPY  03/07/2014    Dr Rachel Pedro:  [facility], 5y recall    COLONOSCOPY N/A 03/22/2021    Dr Shawanda Felix yr recall   [address]      Dr Susan Herrera @ [facility]-Artis's     UPPER GASTROINTESTINAL ENDOSCOPY  02/19/2014    Dr Rachel Pedro:  Biopsy for Artis's surveillance inadequate for evaluation. 3 year repeat    UPPER GASTROINTESTINAL ENDOSCOPY N/A 08/06/2019    Dr Rosy Pleitez over wire-55 F-Gastropathy, gastric polyps, esophagitis-No recall    UPPER GASTROINTESTINAL ENDOSCOPY  08/06/2019    Dr Rosy Pleitez over wire-55 F-Gastropathy, gastric polyps, esophagitis-No recall         Current Outpatient Medications:     meloxicam (MOBIC) 7.5 MG tablet, Take 1 tablet by mouth daily, Disp: 90 tablet, Rfl: 1    gabapentin (NEURONTIN) 600 MG tablet, TAKE 1 TABLET BY MOUTH 2 TIMES A DAY, Disp: 180 tablet, Rfl: 0    lisinopril (PRINIVIL;ZESTRIL) 10 MG tablet, TAKE ONE TABLET ONE TIME DAILY, Disp: 90 tablet, Rfl: 2    tiZANidine (ZANAFLEX) 4 MG tablet, TAKE ONE TABLET BY MOUTH EVERY 6 HOURS AS NEEDED MUST BE SEEN BEFORE FURTHER REFILLS, Disp: 60 tablet, Rfl: 0    omeprazole (PRILOSEC) 40 MG delayed release capsule, TAKE ONE CAPSULE TWO TIMES A DAY, Disp: 180 capsule, Rfl: 3    terbinafine (LAMISIL) 1 % cream, Apply topically 2 times daily. , Disp: 28 g, Rfl: 0    montelukast (SINGULAIR) 10 MG tablet, TAKE 1 TABLET BY MOUTH ONE TIME A DAY, Disp: 90 tablet, Rfl: 3    Multiple Vitamins-Minerals (MULTIVITAMIN ADULT PO), Take by mouth daily, Disp: , Rfl:     Fexofenadine HCl (ALLEGRA PO), Take by mouth daily , Disp: , Rfl:     gabapentin (NEURONTIN) 100 MG capsule, Take 1 capsule by mouth daily for 30 days. , Disp: 30 capsule, Rfl: 3    Allergies:  Iodine    Social History:   Social History     Tobacco Use   Smoking Status Never Smoker   Smokeless Tobacco Never Used     Social History     Substance and Sexual Activity   Alcohol Use No         Family History:   Family History   Problem Relation Age of Onset    Diabetes Mother     Diabetes Father     Heart Disease Father     Crohn's Disease Daughter     Crohn's Disease Other     Colon Cancer Neg Hx     Colon Polyps Neg Hx     Liver Cancer Neg Hx     Stomach Cancer Neg Hx     Rectal Cancer Neg Hx     Esophageal Cancer Neg Hx     Liver Disease Neg Hx        REVIEW OF SYSTEMS:    Constitutional: Negative. HENT: Negative. Eyes: Negative. Respiratory: Negative. Cardiovascular: Negative. Gastrointestinal: Negative. Genitourinary: Negative. Musculoskeletal: Positive for joint pain and neck pain. Skin: Negative. Neurological: Negative. Endo/Heme/Allergies: Negative. Psychiatric/Behavioral: Negative. Review of Systems was obtained by the medical assistant and reviewed by myself. PHYSICAL EXAM:    Vitals:    06/28/21 1527   BP: (!) 149/83   Pulse: 73       Constitutional: Appears well-developed and well-nourished.    Eyes  conjunctiva normal.  Pupils react to light  Ear, nose,throat -Normal pinna and tragus, No scars, or lesions over external nose or ears, no obvious atrophy of tongue  Neck- symmetric, trachea midline, no jugular vein distension  Respiration- chest wall symmetric, normal effort without use of accessory muscles  Musculoskeletal  no significant wasting of muscles noted, no bony deformities, symmetric bulk  Extremities- no clubbing, cyanosis or edema  Skin  warm, dry, and intact. No rash,erythema, or pallor. Psychiatric  mood, affect, and behavior appear normal.       NEUROLOGIC EXAM:    MENTAL STATUS: Alert, oriented, thought content appropriate    CRANIAL NERVES: PERRL, EOMI, symmetric facies, tongue midline    MOTOR:     Right Upper Extremity:    Deltoid: 5/5  Biceps: 5/5  Triceps: 5/5  Wrist Extension: 5/5  Finger Abduction: 5/5  APB: 5/5    Left Upper Extremity:    Deltoid: 5/5  Biceps: 5/5  Triceps: 5/5  Wrist Extension: 5/5  Finger Abduction: 5/5  APB: 5/5    Right Lower Extremity:    Hip Flexion: 5/5  Knee Extension: 5/5  Ankle Plantarflexion: 5/5  Ankle Dorsiflexion: 5/5      Left Lower Extremity:    Hip Flexion: 5/5  Knee Extension: 5/5  Ankle Plantarflexion: 5/5  Ankle Dorsiflexion: 5/5      SOMATOSENSORY:     Right Upper Extremity: normal light touch sensation  Left Upper Extremity: normal light touch sensation  Right Lower Extremity: normal light touch sensation  Left Lower Extremity: normal light touch sensation      REFLEXES:    Biceps: 2+  Patella: 2+    Garcia's: Negative    PROVOCATIVE TESTS:     Tinel's: Negative at the wrist bilaterally      COORDINATION and GAIT:    Gait: Normal      DATA:    Lab Results   Component Value Date    WBC 6.6 03/15/2021    HGB 16.3 03/15/2021    HCT 47.4 03/15/2021    MCV 92.8 03/15/2021     03/15/2021     Lab Results   Component Value Date     03/15/2021    K 4.0 03/15/2021     03/15/2021    CO2 27 03/15/2021    BUN 23 03/15/2021    CREATININE 0.9 03/15/2021    GLUCOSE 90 03/15/2021    CALCIUM 9.3 03/15/2021    PROT 7.0 03/15/2021    LABALBU 4.2 03/15/2021    BILITOT 0.6 03/15/2021    ALKPHOS 114 03/15/2021    AST 24 03/15/2021    ALT 23 03/15/2021    LABGLOM >60 03/15/2021    GFRAA >59 03/15/2021   No results found for: INR, PROTIME    No results found. IMAGING:    My interpretation of imaging studies: MRI of the cervical spine reviewed. Cervical lordosis is preserved.   There is mild multilevel degenerative disc disease. At C3/4 there is a small right-eccentric uncovertebral osteophyte that does not cause any spinal canal or foraminal narrowing. There is a small disc protrusion at C4/5 without causing any canal or foraminal narrowing. There is no disc pathology, canal stenosis or foraminal narrowing at C5/6, C6/7 or C7/T1 to explain the finding of a C7 radiculopathy on his recent EMG. ASSESSMENT AND PLAN:  This is a 62 y.o. male who presents for neurosurgical evaluation of possible carpal tunnel syndrome that was identified on a recent EMG/NCS. He also presents complaining of neck pain having recently undergone a cervical MRI scan. He does have significant pain in his wrists bilaterally, however there is no weakness or sensory loss in the distribution of the median nerve to suggest that is related to carpal tunnel syndrome. His other symptoms are quite nonspecific and his cervical MRI scan does not show any pathology that would be amenable to surgical intervention. We had an extensive discussion and I advised him that I did not feel he would be likely to benefit from any surgery for either his neck pain or carpal tunnel syndrome. He does have complaints of wrist pain that appear more consistent with structural issues within the wrist/carpal bones moreso than carpal tunnel syndrome. I recommended that he be evaluated by a hand specialist to discuss this further and provided a referral to Dr. Cecile Maynard. He also appears to have widespread and polyarticular joint pain and perhaps and underlying polyneuropathy despite labwork that has been unrevealing to this point. 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   If no underlying cause can be identified that explains his symptoms, the most likely diagnosis may be an underlying chronic pain disorder such as

## 2021-06-29 ENCOUNTER — TELEPHONE (OUTPATIENT)
Dept: NEUROSURGERY | Age: 61
End: 2021-06-29

## 2021-06-29 NOTE — TELEPHONE ENCOUNTER
1st attempt to call patient to schedule appointment, left voicemail with call back number [phone]

## 2021-07-01 RX ORDER — TIZANIDINE 4 MG/1
TABLET ORAL
Qty: 60 TABLET | Refills: 0 | Status: SHIPPED | OUTPATIENT
Start: 2021-07-01 | End: 2021-08-20

## 2021-07-02 ENCOUNTER — TELEPHONE (OUTPATIENT)
Dept: NEUROSURGERY | Age: 61
End: 2021-07-02

## 2021-07-02 NOTE — TELEPHONE ENCOUNTER
2nd attempt to call patient to schedule appointment, left voicemail with call back number [phone]

## 2021-07-06 ENCOUNTER — TELEPHONE (OUTPATIENT)
Dept: NEUROSURGERY | Age: 61
End: 2021-07-06

## 2021-07-18 DIAGNOSIS — L50.9 HIVES: ICD-10-CM

## 2021-07-19 RX ORDER — MONTELUKAST SODIUM 10 MG/1
TABLET ORAL
Qty: 90 TABLET | Refills: 3 | Status: SHIPPED | OUTPATIENT
Start: 2021-07-19 | End: 2022-07-07

## 2021-07-23 ENCOUNTER — TELEPHONE (OUTPATIENT)
Dept: NEUROLOGY | Age: 61
End: 2021-07-23

## 2021-07-23 NOTE — TELEPHONE ENCOUNTER
The pt has requested the nerve conduction study results sent to Dr Vin Maria at [id number] [facility]. Fax is [phone].

## 2021-07-28 NOTE — TELEPHONE ENCOUNTER
Called and left patient a VM to let him know that since we was not the ordering DR for the NCS-EMG test that he will need to call the provider that had ordered this test to get his result.

## 2021-08-18 LAB
CHOLESTEROL, TOTAL: 197 MG/DL (ref 160–199)
GLUCOSE BLD-MCNC: 89 MG/DL (ref 74–109)
HDLC SERPL-MCNC: 43 MG/DL (ref 55–121)
LDL CHOLESTEROL CALCULATED: 130 MG/DL
TRIGL SERPL-MCNC: 121 MG/DL (ref 0–149)

## 2021-08-19 ENCOUNTER — TELEPHONE (OUTPATIENT)
Dept: PRIMARY CARE CLINIC | Age: 61
End: 2021-08-19

## 2021-08-19 NOTE — TELEPHONE ENCOUNTER
Pt called to make appt with provider because he cannot refill   Gabapentin 100MG  Or Tizanidine 4 MG w/o being seen by Muna the pharmacy. He is out and wants to know if he can get enough to make it to his appt on 8/27.

## 2021-08-20 DIAGNOSIS — M54.16 LUMBAR RADICULAR PAIN: ICD-10-CM

## 2021-08-20 RX ORDER — GABAPENTIN 600 MG/1
TABLET ORAL
Qty: 60 TABLET | Refills: 0 | Status: SHIPPED | OUTPATIENT
Start: 2021-08-20 | End: 2022-01-05

## 2021-08-20 RX ORDER — TIZANIDINE 4 MG/1
TABLET ORAL
Qty: 60 TABLET | Refills: 0 | Status: SHIPPED | OUTPATIENT
Start: 2021-08-20 | End: 2021-08-27 | Stop reason: SDUPTHER

## 2021-08-20 NOTE — TELEPHONE ENCOUNTER
Fletcher Oconnellr called to request a refill on his medication. Last office visit : 5/19/2021   Next office visit : 8/27/2021     Last UDS:   Amphetamine Screen, Urine   Date Value Ref Range Status   02/20/2020 neg  Final     Barbiturate Screen, Urine   Date Value Ref Range Status   02/20/2020 neg  Final     Benzodiazepine Screen, Urine   Date Value Ref Range Status   02/20/2020 neg  Final     Buprenorphine Urine   Date Value Ref Range Status   02/20/2020 neg  Final     Cocaine Metabolite Screen, Urine   Date Value Ref Range Status   02/20/2020 neg  Final     Gabapentin Screen, Urine   Date Value Ref Range Status   02/20/2020 neg  Final     MDMA, Urine   Date Value Ref Range Status   02/20/2020 neg  Final     Methamphetamine, Urine   Date Value Ref Range Status   02/20/2020 neg  Final     Opiate Scrn, Ur   Date Value Ref Range Status   02/20/2020 neg  Final     Oxycodone Screen, Ur   Date Value Ref Range Status   02/20/2020 neg  Final     PCP Screen, Urine   Date Value Ref Range Status   02/20/2020 neg  Final     Propoxyphene Screen, Urine   Date Value Ref Range Status   02/20/2020 neg  Final     THC Screen, Urine   Date Value Ref Range Status   02/20/2020 neg  Final     Tricyclic Antidepressants, Urine   Date Value Ref Range Status   02/20/2020 neg  Final       Last Ita Greaser: 5-3-21  Medication Contract: 8-20-20   Last Fill: 5-4-21    Requested Prescriptions     Pending Prescriptions Disp Refills    gabapentin (NEURONTIN) 600 MG tablet [Pharmacy Med Name: GABAPENTIN 600MG TABS] 180 tablet 0     Sig: TAKE ONE TABLET TWO TIMES A DAY    tiZANidine (ZANAFLEX) 4 MG tablet [Pharmacy Med Name: TIZANIDINE HCL 4MG TABS] 60 tablet 0     Sig: TAKE ONE TABLET BY MOUTH EVERY 6 HOURS AS NEEDED MUST BE SEEN BEFORE FURTHER REFILLS         Please approve or refuse this medication.    Adriano Noriega MA

## 2021-08-27 ENCOUNTER — OFFICE VISIT (OUTPATIENT)
Dept: PRIMARY CARE CLINIC | Age: 61
End: 2021-08-27
Payer: COMMERCIAL

## 2021-08-27 VITALS
TEMPERATURE: 97.3 F | BODY MASS INDEX: 31.64 KG/M2 | SYSTOLIC BLOOD PRESSURE: 136 MMHG | WEIGHT: 213.6 LBS | OXYGEN SATURATION: 98 % | HEART RATE: 75 BPM | HEIGHT: 69 IN | DIASTOLIC BLOOD PRESSURE: 74 MMHG

## 2021-08-27 DIAGNOSIS — Z79.899 DRUG THERAPY: ICD-10-CM

## 2021-08-27 DIAGNOSIS — M19.90 ARTHRITIS: ICD-10-CM

## 2021-08-27 DIAGNOSIS — M54.16 LUMBAR RADICULAR PAIN: ICD-10-CM

## 2021-08-27 DIAGNOSIS — F41.9 ANXIETY: Primary | ICD-10-CM

## 2021-08-27 DIAGNOSIS — I10 ESSENTIAL HYPERTENSION: ICD-10-CM

## 2021-08-27 LAB
ALCOHOL URINE: NEGATIVE
AMPHETAMINE SCREEN, URINE: NEGATIVE
BARBITURATE SCREEN, URINE: NEGATIVE
BENZODIAZEPINE SCREEN, URINE: NEGATIVE
BUPRENORPHINE URINE: NEGATIVE
COCAINE METABOLITE SCREEN URINE: NEGATIVE
FENTANYL SCREEN, URINE: NEGATIVE
GABAPENTIN SCREEN, URINE: NEGATIVE
MDMA URINE: NEGATIVE
METHADONE SCREEN, URINE: NEGATIVE
METHAMPHETAMINE, URINE: NEGATIVE
OPIATE SCREEN URINE: NEGATIVE
OXYCODONE SCREEN URINE: NEGATIVE
PHENCYCLIDINE SCREEN URINE: NEGATIVE
PROPOXYPHENE SCREEN, URINE: NEGATIVE
SYNTHETIC CANNABINOIDS(K2) SCREEN, URINE: NEGATIVE
THC SCREEN, URINE: NEGATIVE
TRAMADOL SCREEN URINE: NEGATIVE
TRICYCLIC ANTIDEPRESSANTS, UR: NEGATIVE

## 2021-08-27 PROCEDURE — 80305 DRUG TEST PRSMV DIR OPT OBS: CPT | Performed by: NURSE PRACTITIONER

## 2021-08-27 PROCEDURE — 99214 OFFICE O/P EST MOD 30 MIN: CPT | Performed by: NURSE PRACTITIONER

## 2021-08-27 RX ORDER — LISINOPRIL 10 MG/1
TABLET ORAL
Qty: 90 TABLET | Refills: 3 | Status: SHIPPED | OUTPATIENT
Start: 2021-08-27 | End: 2022-02-07

## 2021-08-27 RX ORDER — MELOXICAM 15 MG/1
15 TABLET ORAL DAILY
Qty: 90 TABLET | Refills: 3 | Status: SHIPPED | OUTPATIENT
Start: 2021-08-27 | End: 2022-09-08

## 2021-08-27 RX ORDER — TIZANIDINE 4 MG/1
TABLET ORAL
Qty: 60 TABLET | Refills: 0 | Status: SHIPPED | OUTPATIENT
Start: 2021-08-27 | End: 2021-10-12

## 2021-08-27 RX ORDER — GABAPENTIN 100 MG/1
100 CAPSULE ORAL DAILY
Qty: 30 CAPSULE | Refills: 3 | Status: SHIPPED | OUTPATIENT
Start: 2021-08-27 | End: 2022-10-13 | Stop reason: SDUPTHER

## 2021-08-27 ASSESSMENT — ENCOUNTER SYMPTOMS
RESPIRATORY NEGATIVE: 1
GASTROINTESTINAL NEGATIVE: 1
EYES NEGATIVE: 1

## 2021-08-27 NOTE — PROGRESS NOTES
[address] [facility]  [address] [address]  [address] [address]  Dept: [phone]  Dept Fax: [phone]  Loc: [phone]    Sharee Delaney is a 62 y.o. male who presents today for his medical conditions/complaints as noted below. Sharee Delaney is c/o of Medication Refill and Discuss Medications (wanting to increase mobic )      Chief Complaint   Patient presents with    Medication Refill    Discuss Medications     wanting to increase mobic        HPI:     HPI   Patient is on office today to and needing medication refills along with a new medication contract and urine drug screen. He also is requesting to increase his Mobic up to 15 mg because the 7.5 is not enough. He has had the covid shot, but will send a picture of his card later through [facility] so it can be added to his chart. Past Medical History:   Diagnosis Date    Backache, unspecified     Artis's esophagus     Bleeds easily (HCC)     Diseases of lips     Esophageal reflux     Glossitis     Hematospermia     Hoarseness     Insomnia, unspecified     Mononeuritis of unspecified site     Neck pain     Neuropathy     nate feet    Other abnormal clinical finding     Other chronic pain     Other malaise and fatigue     Other specified disorders of breast     Sore throat     Unspecified essential hypertension     Urinary tract infection, site not specified         Past Surgical History:   Procedure Laterality Date    APPENDECTOMY      CHOLECYSTECTOMY      COLONOSCOPY      Dr Sheldon Logan @ [facility]-normal per patient    COLONOSCOPY  03/07/2014    Dr Radha Deluca:  HP, 5y recall    COLONOSCOPY N/A 03/22/2021    Dr Jorge Duenas yr recall   Relda Pea      Dr Sheldon Logan @ [facility]     UPPER GASTROINTESTINAL ENDOSCOPY  02/19/2014    Dr Radha Deluca:  Biopsy for Artis's surveillance inadequate for evaluation.  3 year repeat    UPPER GASTROINTESTINAL ENDOSCOPY N/A 08/06/2019     Respiratory: Negative. Cardiovascular: Negative. Gastrointestinal: Negative. Endocrine: Negative. Genitourinary: Negative. Musculoskeletal: Positive for arthralgias. Wrist pain from carpal tunnel    Skin: Negative. Neurological: Negative. Hematological: Negative. Psychiatric/Behavioral: Negative. Objective:     Physical Exam  Vitals and nursing note reviewed. Constitutional:       Appearance: Normal appearance. HENT:      Head: Normocephalic and atraumatic. Right Ear: Hearing, tympanic membrane, ear canal and external ear normal.      Left Ear: Hearing, tympanic membrane, ear canal and external ear normal.      Nose: Nose normal.      Mouth/Throat:      Lips: Pink. Mouth: Mucous membranes are moist.      Pharynx: Oropharynx is clear. Eyes:      General: Lids are normal.      Extraocular Movements: Extraocular movements intact. Conjunctiva/sclera: Conjunctivae normal.      Pupils: Pupils are equal, round, and reactive to light. Neck:      Thyroid: No thyromegaly. Cardiovascular:      Rate and Rhythm: Normal rate and regular rhythm. Pulses: Normal pulses. Dorsalis pedis pulses are 2+ on the right side and 2+ on the left side. Posterior tibial pulses are 2+ on the right side and 2+ on the left side. Heart sounds: Normal heart sounds. Pulmonary:      Effort: Pulmonary effort is normal.      Breath sounds: Normal breath sounds and air entry. Abdominal:      General: Bowel sounds are normal.      Palpations: Abdomen is soft. Musculoskeletal:      Right wrist: Tenderness present. Decreased range of motion. Left wrist: Tenderness present. Decreased range of motion. Cervical back: Neck supple. Tenderness present. Decreased range of motion. Thoracic back: No tenderness. Normal range of motion. Lumbar back: No tenderness. Normal range of motion. Lymphadenopathy:      Cervical: No cervical adenopathy. Skin:     General: Skin is warm and dry. Capillary Refill: Capillary refill takes less than 2 seconds. Neurological:      General: No focal deficit present. Mental Status: He is alert and oriented to person, place, and time. Mental status is at baseline. Coordination: Coordination is intact. Psychiatric:         Mood and Affect: Mood normal.         Speech: Speech normal.         Behavior: Behavior normal.         Thought Content: Thought content normal.         Cognition and Memory: Cognition and memory normal.         Judgment: Judgment normal.         /74   Pulse 75   Temp 97.3 °F (36.3 °C)   Ht 5' 9\" (1.753 m)   Wt 213 lb 9.6 oz (96.9 kg)   SpO2 98%   BMI 31.54 kg/m²     Assessment:      Diagnosis Orders   1. Anxiety     2. Lumbar radicular pain  gabapentin (NEURONTIN) 100 MG capsule   3. Essential hypertension  lisinopril (PRINIVIL;ZESTRIL) 10 MG tablet   4. Arthritis  meloxicam (MOBIC) 15 MG tablet   5. Drug therapy  POCT Rapid Drug Screen       Results for orders placed or performed in visit on 08/27/21   POCT Rapid Drug Screen   Result Value Ref Range    Alcohol, Urine negative     Amphetamine Screen, Urine negative     Barbiturate Screen, Urine negative     Benzodiazepine Screen, Urine negative     Buprenorphine Urine negative     Cocaine Metabolite Screen, Urine negative     FENTANYL SCREEN, URINE negative     Gabapentin Screen, Urine negative     MDMA, Urine negative     Methadone Screen, Urine negative     Methamphetamine, Urine negative     Opiate Scrn, Ur negative     Oxycodone Screen, Ur negative     PCP Screen, Urine negative     Propoxyphene Screen, Urine negative     Synthetic Cannabinoids (K2) Screen, Urine negative     THC Screen, Urine negative     Tramadol Scrn, Ur negative     Tricyclic Antidepressants, Urine negative        Plan: Will increase mobic as discussed. Labs have been completed by be well within.   We will continue current medication regimen at this time. Patient is to follow-up with Ortho for possible carpal tunnel surgery. Patient can return to office for carpal tunnel injection if needed. More than 50% of the time was spent counseling and coordinating care for a total time of 34 mins face to face. Return in about 6 months (around 2/27/2022) for Follow up chronic conditions. Orders Placed This Encounter   Procedures    POCT Rapid Drug Screen       Orders Placed This Encounter   Medications    tiZANidine (ZANAFLEX) 4 MG tablet     Sig: TAKE ONE TABLET BY MOUTH EVERY 6 HOURS AS NEEDED     Dispense:  60 tablet     Refill:  0    gabapentin (NEURONTIN) 100 MG capsule     Sig: Take 1 capsule by mouth daily for 30 days. Dispense:  30 capsule     Refill:  3    lisinopril (PRINIVIL;ZESTRIL) 10 MG tablet     Sig: TAKE ONE TABLET ONE TIME DAILY     Dispense:  90 tablet     Refill:  3    meloxicam (MOBIC) 15 MG tablet     Sig: Take 1 tablet by mouth daily     Dispense:  90 tablet     Refill:  3            Patient offered educational handouts and has had all questions answered. Patient voices understanding and agrees to plans along with risks and benefits of plan. Patient is instructed to continue prior meds, diet, and exercise plans as instructed. Patient agrees to follow up as instructed and sooner if needed. Patient agrees to go to ER if condition becomes emergent. EMR Dragon/transcription disclaimer: Some of this encounter note is an electronic transcription/translation of spoken language to printed text. The electronic translation of spoken language may permit erroneous, or at times, nonsensical words or phrases to be inadvertently transcribed.  Although I have reviewed the note for such errors, some may still exist.    Electronically signed by MARTIN Lucio on 8/27/2021 at 8:24 AM

## 2021-10-12 RX ORDER — TIZANIDINE 4 MG/1
TABLET ORAL
Qty: 60 TABLET | Refills: 0 | Status: SHIPPED | OUTPATIENT
Start: 2021-10-12 | End: 2021-12-02

## 2021-10-14 ENCOUNTER — OFFICE VISIT (OUTPATIENT)
Dept: PRIMARY CARE CLINIC | Age: 61
End: 2021-10-14
Payer: COMMERCIAL

## 2021-10-14 VITALS
DIASTOLIC BLOOD PRESSURE: 78 MMHG | BODY MASS INDEX: 32.11 KG/M2 | SYSTOLIC BLOOD PRESSURE: 138 MMHG | WEIGHT: 216.8 LBS | TEMPERATURE: 97.5 F | OXYGEN SATURATION: 98 % | HEART RATE: 68 BPM | HEIGHT: 69 IN

## 2021-10-14 DIAGNOSIS — A09 DIARRHEA OF INFECTIOUS ORIGIN: Primary | ICD-10-CM

## 2021-10-14 DIAGNOSIS — R25.2 SPASM: ICD-10-CM

## 2021-10-14 PROCEDURE — 99213 OFFICE O/P EST LOW 20 MIN: CPT | Performed by: NURSE PRACTITIONER

## 2021-10-14 RX ORDER — METRONIDAZOLE 500 MG/1
500 TABLET ORAL 3 TIMES DAILY
Qty: 30 TABLET | Refills: 0 | Status: CANCELLED | OUTPATIENT
Start: 2021-10-14 | End: 2021-10-24

## 2021-10-14 RX ORDER — CIPROFLOXACIN 500 MG/1
500 TABLET, FILM COATED ORAL 2 TIMES DAILY
Qty: 14 TABLET | Refills: 0 | Status: SHIPPED | OUTPATIENT
Start: 2021-10-14 | End: 2021-10-21

## 2021-10-14 ASSESSMENT — ENCOUNTER SYMPTOMS
DIARRHEA: 1
EYES NEGATIVE: 1
RESPIRATORY NEGATIVE: 1
NAUSEA: 0
ABDOMINAL PAIN: 0

## 2021-10-14 NOTE — PROGRESS NOTES
[address]  [id number] [address]  Dept: [phone]  Dept Fax: [phone]  Loc: [phone]    Nell Lynn is a 62 y.o. male who presents today for his medical conditions/complaints as noted below. Nell Lynn is c/o of Diarrhea      Chief Complaint   Patient presents with    Diarrhea       HPI:     HPI  Patient is in office today to discuss having diarrhea for 2 weeks. Now he's having 2 episodes a day he was having 4-5 times a day, but he has quit eating near as much just so it would slow down. He is also having worsening spasms as well. These spasms have been ongoing for months now, but recently have worsened. Past Medical History:   Diagnosis Date    Backache, unspecified     Artis's esophagus     Bleeds easily (HCC)     Diseases of lips     Esophageal reflux     Glossitis     Hematospermia     Hoarseness     Insomnia, unspecified     Mononeuritis of unspecified site     Neck pain     Neuropathy     nate feet    Other abnormal clinical finding     Other chronic pain     Other malaise and fatigue     Other specified disorders of breast     Sore throat     Unspecified essential hypertension     Urinary tract infection, site not specified         Past Surgical History:   Procedure Laterality Date    APPENDECTOMY      CHOLECYSTECTOMY      COLONOSCOPY      Dr Flavio Irizarry @ [facility]-normal per patient    COLONOSCOPY  03/07/2014    Dr Sherle Schaumann:  HP, 5y recall    COLONOSCOPY N/A 03/22/2021    Dr Payne Gins yr recall   Lissy Irizarry @ [facility]     UPPER GASTROINTESTINAL ENDOSCOPY  02/19/2014    Dr Sherle Schaumann:  Biopsy for Artis's surveillance inadequate for evaluation.  3 year repeat    UPPER GASTROINTESTINAL ENDOSCOPY N/A 08/06/2019    Dr Barrios Fail over [address]-55 F-Gastropathy, gastric polyps, esophagitis-No recall    UPPER GASTROINTESTINAL ENDOSCOPY  08/06/2019 Dr Allyssa Jacobo over wire-55 F-Gastropathy, gastric polyps, esophagitis-No recall       Social History     Tobacco Use    Smoking status: Never Smoker    Smokeless tobacco: Never Used   Substance Use Topics    Alcohol use: No        Current Outpatient Medications   Medication Sig Dispense Refill    ciprofloxacin (CIPRO) 500 MG tablet Take 1 tablet by mouth 2 times daily for 7 days 14 tablet 0    tiZANidine (ZANAFLEX) 4 MG tablet TAKE ONE TABLET BY MOUTH EVERY 6 HOURS AS NEEDED FOR MUSCLE SPASM 60 tablet 0    lisinopril (PRINIVIL;ZESTRIL) 10 MG tablet TAKE ONE TABLET ONE TIME DAILY 90 tablet 3    meloxicam (MOBIC) 15 MG tablet Take 1 tablet by mouth daily 90 tablet 3    montelukast (SINGULAIR) 10 MG tablet TAKE ONE TABLET BY MOUTH ONE TIME DAILY 90 tablet 3    omeprazole (PRILOSEC) 40 MG delayed release capsule TAKE ONE CAPSULE TWO TIMES A  capsule 3    terbinafine (LAMISIL) 1 % cream Apply topically 2 times daily. 28 g 0    Multiple Vitamins-Minerals (MULTIVITAMIN ADULT PO) Take by mouth daily      gabapentin (NEURONTIN) 100 MG capsule Take 1 capsule by mouth daily for 30 days. 30 capsule 3    gabapentin (NEURONTIN) 600 MG tablet TAKE ONE TABLET TWO TIMES A DAY 60 tablet 0    Fexofenadine HCl (ALLEGRA PO) Take by mouth daily        No current facility-administered medications for this visit. Allergies   Allergen Reactions    Iodine Rash     Skin peels, feet and hands turn red       Family History   Problem Relation Age of Onset    Diabetes Mother     Diabetes Father     Heart Disease Father     Crohn's Disease Daughter     Crohn's Disease Other     Colon Cancer Neg Hx     Colon Polyps Neg Hx     Liver Cancer Neg Hx     Stomach Cancer Neg Hx     Rectal Cancer Neg Hx     Esophageal Cancer Neg Hx     Liver Disease Neg Hx                Subjective:      Review of Systems   Constitutional: Negative. HENT: Negative. Eyes: Negative. Respiratory: Negative. Cardiovascular: Negative. Gastrointestinal: Positive for diarrhea. Negative for abdominal pain and nausea. Endocrine: Negative. Genitourinary: Negative. Musculoskeletal: Positive for myalgias (spasms). Skin: Negative. Neurological: Negative. Hematological: Negative. Psychiatric/Behavioral: Negative. Objective:     Physical Exam  Vitals and nursing note reviewed. Constitutional:       Appearance: Normal appearance. HENT:      Head: Normocephalic and atraumatic. Right Ear: Hearing, tympanic membrane, ear canal and external ear normal.      Left Ear: Hearing, tympanic membrane, ear canal and external ear normal.      Nose: Nose normal.      Mouth/Throat:      Lips: Pink. Mouth: Mucous membranes are moist.      Pharynx: Oropharynx is clear. Eyes:      General: Lids are normal.      Extraocular Movements: Extraocular movements intact. Conjunctiva/sclera: Conjunctivae normal.      Pupils: Pupils are equal, round, and reactive to light. Neck:      Thyroid: No thyromegaly. Cardiovascular:      Rate and Rhythm: Normal rate and regular rhythm. Pulses: Normal pulses. Dorsalis pedis pulses are 2+ on the right side and 2+ on the left side. Posterior tibial pulses are 2+ on the right side and 2+ on the left side. Heart sounds: Normal heart sounds. Pulmonary:      Effort: Pulmonary effort is normal.      Breath sounds: Normal breath sounds and air entry. Abdominal:      General: Bowel sounds are normal.      Palpations: Abdomen is soft. Musculoskeletal:      Cervical back: Full passive range of motion without pain, normal range of motion and neck supple. Thoracic back: No tenderness. Normal range of motion. Lumbar back: No tenderness. Normal range of motion. Lymphadenopathy:      Cervical: No cervical adenopathy. Skin:     General: Skin is warm and dry. Capillary Refill: Capillary refill takes less than 2 seconds. Neurological:      General: No focal deficit present. Mental Status: He is alert and oriented to person, place, and time. Mental status is at baseline. Coordination: Coordination is intact. Psychiatric:         Mood and Affect: Mood normal.         Speech: Speech normal.         Behavior: Behavior normal.         Thought Content: Thought content normal.         Cognition and Memory: Cognition and memory normal.         Judgment: Judgment normal.         /78   Pulse 68   Temp 97.5 °F (36.4 °C)   Ht 5' 9\" (1.753 m)   Wt 216 lb 12.8 oz (98.3 kg)   SpO2 98%   BMI 32.02 kg/m²     Assessment:      Diagnosis Orders   1. Diarrhea of infectious origin  Culture, Stool    Basic Metabolic Panel    CBC Auto Differential   2. Spasm  Magnesium       No results found for this visit on 10/14/21. Plan:     I am treating with oral abx for diarrhea due to the severity and length of symptoms. If abx does not improve symptoms will need stool culture. Checking labs for worsening spasms. Will determine next step based on these results. More than 50% of the time was spent counseling and coordinating care for a total time of 23 mins face to face. Return if symptoms worsen or fail to improve, for Keep follow up as scheduled.     Orders Placed This Encounter   Procedures    Culture, Stool     Standing Status:   Future     Standing Expiration Date:   10/14/2022    Magnesium     Standing Status:   Future     Number of Occurrences:   1     Standing Expiration Date:   10/14/2022    Basic Metabolic Panel     Standing Status:   Future     Number of Occurrences:   1     Standing Expiration Date:   10/14/2022    CBC Auto Differential     Standing Status:   Future     Number of Occurrences:   1     Standing Expiration Date:   10/14/2022       Orders Placed This Encounter   Medications    ciprofloxacin (CIPRO) 500 MG tablet     Sig: Take 1 tablet by mouth 2 times daily for 7 days     Dispense:  14 tablet Refill:  0            Patient offered educational handouts and has had all questions answered. Patient voices understanding and agrees to plans along with risks and benefits of plan. Patient is instructed to continue prior meds, diet, and exercise plans as instructed. Patient agrees to follow up as instructed and sooner if needed. Patient agrees to go to ER if condition becomes emergent. EMR Dragon/transcription disclaimer: Some of this encounter note is an electronic transcription/translation of spoken language to printed text. The electronic translation of spoken language may permit erroneous, or at times, nonsensical words or phrases to be inadvertently transcribed.  Although I have reviewed the note for such errors, some may still exist.    Electronically signed by MARTIN Corcoran on 10/14/2021 at 11:15 PM

## 2021-10-18 ENCOUNTER — TELEPHONE (OUTPATIENT)
Dept: PRIMARY CARE CLINIC | Age: 61
End: 2021-10-18

## 2021-10-18 NOTE — TELEPHONE ENCOUNTER
----- Message from MARTIN Russell sent at 10/14/2021 10:10 PM CDT -----  Please let patient know that lab results were normal.  Thanks!

## 2021-12-02 RX ORDER — TIZANIDINE 4 MG/1
TABLET ORAL
Qty: 60 TABLET | Refills: 0 | Status: SHIPPED | OUTPATIENT
Start: 2021-12-02 | End: 2022-02-07

## 2021-12-02 NOTE — TELEPHONE ENCOUNTER
Coco Loachapoka called to request a refill on his medication.       Last office visit : 10/14/2021   Next office visit : 3/1/2022     Requested Prescriptions     Signed Prescriptions Disp Refills    tiZANidine (ZANAFLEX) 4 MG tablet 60 tablet 0     Sig: TAKE ONE TABLET BY MOUTH EVERY 6 HOURS AS NEEDED FOR MUSCLE SPASM     Authorizing Provider: Faye Morgan     Ordering User: Luz Han MA

## 2022-01-04 DIAGNOSIS — M54.16 LUMBAR RADICULAR PAIN: ICD-10-CM

## 2022-01-05 RX ORDER — GABAPENTIN 600 MG/1
TABLET ORAL
Qty: 180 TABLET | Refills: 0 | Status: SHIPPED | OUTPATIENT
Start: 2022-01-05 | End: 2022-07-07

## 2022-01-05 NOTE — TELEPHONE ENCOUNTER
Katiuska Jackman called to request a refill on his medication. Last office visit : 10/14/2021   Next office visit : 3/1/2022     Last UDS:   Amphetamine Screen, Urine   Date Value Ref Range Status   08/27/2021 negative  Final     Barbiturate Screen, Urine   Date Value Ref Range Status   08/27/2021 negative  Final     Benzodiazepine Screen, Urine   Date Value Ref Range Status   08/27/2021 negative  Final     Buprenorphine Urine   Date Value Ref Range Status   08/27/2021 negative  Final     Cocaine Metabolite Screen, Urine   Date Value Ref Range Status   08/27/2021 negative  Final     Gabapentin Screen, Urine   Date Value Ref Range Status   08/27/2021 negative  Final     MDMA, Urine   Date Value Ref Range Status   08/27/2021 negative  Final     Methamphetamine, Urine   Date Value Ref Range Status   08/27/2021 negative  Final     Opiate Scrn, Ur   Date Value Ref Range Status   08/27/2021 negative  Final     Oxycodone Screen, Ur   Date Value Ref Range Status   08/27/2021 negative  Final     PCP Screen, Urine   Date Value Ref Range Status   08/27/2021 negative  Final     Propoxyphene Screen, Urine   Date Value Ref Range Status   08/27/2021 negative  Final     THC Screen, Urine   Date Value Ref Range Status   08/27/2021 negative  Final     Tricyclic Antidepressants, Urine   Date Value Ref Range Status   08/27/2021 negative  Final       Last [place] Jose Alberto: 05-03-21  Medication Contract: 08-27-21   Last Fill: 08-27-21    Requested Prescriptions     Pending Prescriptions Disp Refills    gabapentin (NEURONTIN) 600 MG tablet [Pharmacy Med Name: GABAPENTIN 600MG TABS] 180 tablet 0     Sig: TAKE ONE TABLET TWO TIMES A DAY         Please approve or refuse this medication.    Lavern Tidwell MA

## 2022-02-07 DIAGNOSIS — I10 ESSENTIAL HYPERTENSION: ICD-10-CM

## 2022-02-07 RX ORDER — TIZANIDINE 4 MG/1
TABLET ORAL
Qty: 60 TABLET | Refills: 0 | Status: SHIPPED | OUTPATIENT
Start: 2022-02-07 | End: 2022-04-07

## 2022-02-07 RX ORDER — LISINOPRIL 10 MG/1
TABLET ORAL
Qty: 90 TABLET | Refills: 2 | Status: SHIPPED | OUTPATIENT
Start: 2022-02-07 | End: 2022-03-29 | Stop reason: SDUPTHER

## 2022-03-01 ENCOUNTER — OFFICE VISIT (OUTPATIENT)
Dept: PRIMARY CARE CLINIC | Age: 62
End: 2022-03-01
Payer: COMMERCIAL

## 2022-03-01 ENCOUNTER — HOSPITAL ENCOUNTER (OUTPATIENT)
Dept: GENERAL RADIOLOGY | Age: 62
Discharge: HOME | End: 2022-03-01
Payer: COMMERCIAL

## 2022-03-01 VITALS
HEART RATE: 69 BPM | HEIGHT: 69 IN | SYSTOLIC BLOOD PRESSURE: 150 MMHG | WEIGHT: 221 LBS | OXYGEN SATURATION: 98 % | TEMPERATURE: 98.2 F | DIASTOLIC BLOOD PRESSURE: 80 MMHG | BODY MASS INDEX: 32.73 KG/M2

## 2022-03-01 DIAGNOSIS — G89.29 CHRONIC MIDLINE THORACIC BACK PAIN: ICD-10-CM

## 2022-03-01 DIAGNOSIS — M54.6 CHRONIC MIDLINE THORACIC BACK PAIN: ICD-10-CM

## 2022-03-01 DIAGNOSIS — F41.9 ANXIETY: ICD-10-CM

## 2022-03-01 DIAGNOSIS — I10 ESSENTIAL HYPERTENSION: Primary | ICD-10-CM

## 2022-03-01 DIAGNOSIS — Z76.0 MEDICATION REFILL: ICD-10-CM

## 2022-03-01 PROCEDURE — 72072 X-RAY EXAM THORAC SPINE 3VWS: CPT

## 2022-03-01 PROCEDURE — 99214 OFFICE O/P EST MOD 30 MIN: CPT | Performed by: NURSE PRACTITIONER

## 2022-03-01 RX ORDER — CYCLOBENZAPRINE HCL 10 MG
10 TABLET ORAL NIGHTLY PRN
Qty: 30 TABLET | Refills: 0 | Status: SHIPPED | OUTPATIENT
Start: 2022-03-01 | End: 2022-03-11

## 2022-03-01 RX ORDER — HYDROXYZINE HYDROCHLORIDE 10 MG/1
10 TABLET, FILM COATED ORAL 2 TIMES DAILY PRN
Qty: 60 TABLET | Refills: 1 | Status: SHIPPED | OUTPATIENT
Start: 2022-03-01 | End: 2022-03-31

## 2022-03-01 RX ORDER — OMEPRAZOLE 40 MG/1
CAPSULE, DELAYED RELEASE ORAL
Qty: 180 CAPSULE | Refills: 3 | Status: SHIPPED | OUTPATIENT
Start: 2022-03-01

## 2022-03-01 ASSESSMENT — PATIENT HEALTH QUESTIONNAIRE - PHQ9
SUM OF ALL RESPONSES TO PHQ QUESTIONS 1-9: 0
SUM OF ALL RESPONSES TO PHQ QUESTIONS 1-9: 0
1. LITTLE INTEREST OR PLEASURE IN DOING THINGS: 0
SUM OF ALL RESPONSES TO PHQ9 QUESTIONS 1 & 2: 0
SUM OF ALL RESPONSES TO PHQ QUESTIONS 1-9: 0
SUM OF ALL RESPONSES TO PHQ QUESTIONS 1-9: 0
2. FEELING DOWN, DEPRESSED OR HOPELESS: 0

## 2022-03-01 ASSESSMENT — ENCOUNTER SYMPTOMS
EYES NEGATIVE: 1
BACK PAIN: 1
RESPIRATORY NEGATIVE: 1
GASTROINTESTINAL NEGATIVE: 1

## 2022-03-01 NOTE — PROGRESS NOTES
[address] [facility] CARE  [address] [place] [address]  Dept: [phone]  Dept Fax: [phone]  Loc: [phone]    John Collazo is a 65 y.o. male who presents today for his medical conditions/complaints as noted below. John Collazo is c/o of Back Pain and Abdominal Pain (spasms in stomach and back)      Chief Complaint   Patient presents with    Back Pain    Abdominal Pain     spasms in stomach and back       HPI:     HPI  Pt is here for 6 month follow up for chronic conditions including HTN and anxiety with concerns of back pain that radiated to this stomach and shoulder that causes spasms. He has had imaging done in the past.    He would like to be put on hydroxyzine again but a smaller dose. This is for the increased itching he has been having. Past Medical History:   Diagnosis Date    Backache, unspecified     Artis's esophagus     Bleeds easily (HCC)     Diseases of lips     Esophageal reflux     Glossitis     Hematospermia     Hoarseness     Insomnia, unspecified     Mononeuritis of unspecified site     Neck pain     Neuropathy     nate feet    Other abnormal clinical finding     Other chronic pain     Other malaise and fatigue     Other specified disorders of breast     Sore throat     Unspecified essential hypertension     Urinary tract infection, site not specified         Past Surgical History:   Procedure Laterality Date    APPENDECTOMY      CHOLECYSTECTOMY      COLONOSCOPY      Dr Marcellus Hudson @ [facility]-normal per patient    COLONOSCOPY  03/07/2014    Dr Edd Velazquez:  HP, 5y recall    COLONOSCOPY N/A 03/22/2021    Dr Iggy Kumar yr recall   [place]      Dr Marcellus Hudson @ [facility]-Artis's     UPPER GASTROINTESTINAL ENDOSCOPY  02/19/2014    Dr Edd Velazquez:  Biopsy for Artis's surveillance inadequate for evaluation.  3 year repeat    UPPER GASTROINTESTINAL ENDOSCOPY N/A 08/06/2019    Dr Skyler Coley over wire-55 F-Gastropathy, gastric polyps, esophagitis-No recall    UPPER GASTROINTESTINAL ENDOSCOPY  08/06/2019    Dr Supriya Venegas over wire-55 F-Gastropathy, gastric polyps, esophagitis-No recall       Social History     Tobacco Use    Smoking status: Never Smoker    Smokeless tobacco: Never Used   Substance Use Topics    Alcohol use: No        Current Outpatient Medications   Medication Sig Dispense Refill    omeprazole (PRILOSEC) 40 MG delayed release capsule TAKE ONE CAPSULE TWO TIMES A  capsule 3    hydrOXYzine (ATARAX) 10 MG tablet Take 1 tablet by mouth 2 times daily as needed for Itching or Anxiety 60 tablet 1    cyclobenzaprine (FLEXERIL) 10 MG tablet Take 1 tablet by mouth nightly as needed for Muscle spasms 30 tablet 0    tiZANidine (ZANAFLEX) 4 MG tablet TAKE ONE TABLET BY MOUTH EVERY 6 HOURS AS NEEDED FOR MUSCLE SPASMS 60 tablet 0    lisinopril (PRINIVIL;ZESTRIL) 10 MG tablet TAKE ONE TABLET ONE TIME DAILY 90 tablet 2    gabapentin (NEURONTIN) 600 MG tablet TAKE ONE TABLET TWO TIMES A  tablet 0    gabapentin (NEURONTIN) 100 MG capsule Take 1 capsule by mouth daily for 30 days. 30 capsule 3    meloxicam (MOBIC) 15 MG tablet Take 1 tablet by mouth daily 90 tablet 3    montelukast (SINGULAIR) 10 MG tablet TAKE ONE TABLET BY MOUTH ONE TIME DAILY 90 tablet 3    terbinafine (LAMISIL) 1 % cream Apply topically 2 times daily. 28 g 0    Multiple Vitamins-Minerals (MULTIVITAMIN ADULT PO) Take by mouth daily      Fexofenadine HCl (ALLEGRA PO) Take by mouth daily        No current facility-administered medications for this visit.        Allergies   Allergen Reactions    Iodine Rash     Skin peels, feet and hands turn red       Family History   Problem Relation Age of Onset    Diabetes Mother     Diabetes Father     Heart Disease Father     Crohn's Disease Daughter     Crohn's Disease Other     Colon Cancer Neg Hx     Colon Polyps Neg Hx     Liver Cancer Neg Hx  Stomach Cancer Neg Hx     Rectal Cancer Neg Hx     Esophageal Cancer Neg Hx     Liver Disease Neg Hx                Subjective:      Review of Systems   Constitutional: Negative. HENT: Negative. Eyes: Negative. Respiratory: Negative. Cardiovascular: Negative. Gastrointestinal: Negative. Endocrine: Negative. Genitourinary: Negative. Musculoskeletal: Positive for arthralgias, back pain and neck pain. Spasm in diaphragm area    Skin: Negative. Neurological: Negative. Hematological: Negative. Psychiatric/Behavioral: The patient is nervous/anxious (stable). Objective:     Physical Exam  Vitals and nursing note reviewed. Constitutional:       Appearance: Normal appearance. HENT:      Head: Normocephalic and atraumatic. Right Ear: Hearing, tympanic membrane, ear canal and external ear normal.      Left Ear: Hearing, tympanic membrane, ear canal and external ear normal.      Nose: Nose normal.      Mouth/Throat:      Lips: Pink. Mouth: Mucous membranes are moist.      Pharynx: Oropharynx is clear. Eyes:      General: Lids are normal.      Extraocular Movements: Extraocular movements intact. Conjunctiva/sclera: Conjunctivae normal.      Pupils: Pupils are equal, round, and reactive to light. Neck:      Thyroid: No thyromegaly. Cardiovascular:      Rate and Rhythm: Normal rate and regular rhythm. Pulses: Normal pulses. Dorsalis pedis pulses are 2+ on the right side and 2+ on the left side. Posterior tibial pulses are 2+ on the right side and 2+ on the left side. Heart sounds: Normal heart sounds. Pulmonary:      Effort: Pulmonary effort is normal.      Breath sounds: Normal breath sounds and air entry. Abdominal:      General: Bowel sounds are normal.      Palpations: Abdomen is soft. Musculoskeletal:      Right wrist: Tenderness present. Decreased range of motion. Left wrist: Tenderness present.  Decreased range of motion. Cervical back: Neck supple. Tenderness present. Decreased range of motion. Thoracic back: Tenderness present. Decreased range of motion. Lumbar back: No tenderness. Decreased range of motion. Lymphadenopathy:      Cervical: No cervical adenopathy. Skin:     General: Skin is warm and dry. Capillary Refill: Capillary refill takes less than 2 seconds. Neurological:      General: No focal deficit present. Mental Status: He is alert and oriented to person, place, and time. Mental status is at baseline. Coordination: Coordination is intact. Psychiatric:         Mood and Affect: Mood normal.         Speech: Speech normal.         Behavior: Behavior normal.         Thought Content: Thought content normal.         Cognition and Memory: Cognition and memory normal.         Judgment: Judgment normal.         BP (!) 150/80 (Site: Left Upper Arm)   Pulse 69   Temp 98.2 °F (36.8 °C)   Ht 5' 9\" (1.753 m)   Wt 221 lb (100.2 kg)   SpO2 98%   BMI 32.64 kg/m²     Assessment:      Diagnosis Orders   1. Essential hypertension     2. Medication refill  omeprazole (PRILOSEC) 40 MG delayed release capsule   3. Anxiety     4. Chronic midline thoracic back pain  XR THORACIC SPINE (3 VIEWS)       No results found for this visit on 03/01/22. Plan:     Sending in 10 mg Atarax to see if this will help with itching. Dr Anamaria Hoffman for carpal tunnel. Thoracic xray. Patient will likely need MRI based on results. Will try Flexeril to see if this will help with spasms. More than 50% of the time was spent counseling and coordinating care for a total time of 37 mins face to face. Return in about 4 weeks (around 3/29/2022) for Medication Follow Up.     Orders Placed This Encounter   Procedures    XR THORACIC SPINE (3 VIEWS)     Standing Status:   Future     Number of Occurrences:   1     Standing Expiration Date:   3/1/2023       Orders Placed This Encounter   Medications    omeprazole (PRILOSEC) 40 MG delayed release capsule     Sig: TAKE ONE CAPSULE TWO TIMES A DAY     Dispense:  180 capsule     Refill:  3    hydrOXYzine (ATARAX) 10 MG tablet     Sig: Take 1 tablet by mouth 2 times daily as needed for Itching or Anxiety     Dispense:  60 tablet     Refill:  1    cyclobenzaprine (FLEXERIL) 10 MG tablet     Sig: Take 1 tablet by mouth nightly as needed for Muscle spasms     Dispense:  30 tablet     Refill:  0            Patient offered educational handouts and has had all questions answered. Patient voices understanding and agrees to plans along with risks and benefits of plan. Patient is instructed to continue prior meds, diet, and exercise plans as instructed. Patient agrees to follow up as instructed and sooner if needed. Patient agrees to go to ER if condition becomes emergent. EMR Dragon/transcription disclaimer: Some of this encounter note is an electronic transcription/translation of spoken language to printed text. The electronic translation of spoken language may permit erroneous, or at times, nonsensical words or phrases to be inadvertently transcribed.  Although I have reviewed the note for such errors, some may still exist.    Electronically signed by MARTIN Leigh on 3/1/2022 at 3:30 PM

## 2022-03-02 ENCOUNTER — TELEPHONE (OUTPATIENT)
Dept: PRIMARY CARE CLINIC | Age: 62
End: 2022-03-02

## 2022-03-02 DIAGNOSIS — R20.0 NUMBNESS AND TINGLING: ICD-10-CM

## 2022-03-02 DIAGNOSIS — G89.29 CHRONIC MIDLINE THORACIC BACK PAIN: Primary | ICD-10-CM

## 2022-03-02 DIAGNOSIS — R20.2 NUMBNESS AND TINGLING: ICD-10-CM

## 2022-03-02 DIAGNOSIS — M54.6 CHRONIC MIDLINE THORACIC BACK PAIN: Primary | ICD-10-CM

## 2022-03-02 NOTE — TELEPHONE ENCOUNTER
Pt stated verbal understanding he will check his my chart for MRI order he stated or if we would call him

## 2022-03-02 NOTE — TELEPHONE ENCOUNTER
----- Message from MARTIN Chowdhury sent at 3/2/2022  6:54 AM CST -----  Moderate amount of degenerative changes and spurring is noted. Will order MRI of thoracic spine to further evaluate.

## 2022-03-11 ENCOUNTER — HOSPITAL ENCOUNTER (OUTPATIENT)
Dept: MRI IMAGING | Age: 62
Discharge: HOME | End: 2022-03-11
Payer: COMMERCIAL

## 2022-03-11 DIAGNOSIS — R20.0 NUMBNESS AND TINGLING: ICD-10-CM

## 2022-03-11 DIAGNOSIS — G89.29 CHRONIC MIDLINE THORACIC BACK PAIN: ICD-10-CM

## 2022-03-11 DIAGNOSIS — M54.6 CHRONIC MIDLINE THORACIC BACK PAIN: ICD-10-CM

## 2022-03-11 DIAGNOSIS — R20.2 NUMBNESS AND TINGLING: ICD-10-CM

## 2022-03-11 PROCEDURE — 72146 MRI CHEST SPINE W/O DYE: CPT

## 2022-03-16 ENCOUNTER — TELEPHONE (OUTPATIENT)
Dept: PRIMARY CARE CLINIC | Age: 62
End: 2022-03-16

## 2022-03-16 DIAGNOSIS — G89.29 CHRONIC MIDLINE THORACIC BACK PAIN: Primary | ICD-10-CM

## 2022-03-16 DIAGNOSIS — M54.6 CHRONIC MIDLINE THORACIC BACK PAIN: Primary | ICD-10-CM

## 2022-03-16 NOTE — TELEPHONE ENCOUNTER
----- Message from Genie HoldingMARTIN sent at 3/15/2022  2:18 PM CDT -----  Please let patient know that MRI of thoracic spine has returned. This was actually normal.  No disc issues were noted at all.

## 2022-03-16 NOTE — TELEPHONE ENCOUNTER
----- Message from MARTIN Chowdhury sent at 3/15/2022  2:18 PM CDT -----  Please let patient know that MRI of thoracic spine has returned. This was actually normal.  No disc issues were noted at all.

## 2022-03-16 NOTE — TELEPHONE ENCOUNTER
----- Message from MARTIN Centeno sent at 3/15/2022  2:18 PM CDT -----  Please let patient know that MRI of thoracic spine has returned. This was actually normal.  No disc issues were noted at all.

## 2022-03-24 ENCOUNTER — HOSPITAL ENCOUNTER (OUTPATIENT)
Dept: PHYSICAL THERAPY | Age: 62
Setting detail: THERAPIES SERIES
Discharge: HOME | End: 2022-03-24
Payer: COMMERCIAL

## 2022-03-24 PROCEDURE — 97162 PT EVAL MOD COMPLEX 30 MIN: CPT

## 2022-03-24 ASSESSMENT — PAIN DESCRIPTION - DESCRIPTORS: DESCRIPTORS: CRAMPING;SQUEEZING

## 2022-03-24 ASSESSMENT — PAIN DESCRIPTION - LOCATION: LOCATION: BACK;RIB CAGE

## 2022-03-24 ASSESSMENT — PAIN DESCRIPTION - ORIENTATION: ORIENTATION: MID

## 2022-03-24 ASSESSMENT — PAIN SCALES - GENERAL: PAINLEVEL_OUTOF10: 3
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
## 2022-03-28 ENCOUNTER — HOSPITAL ENCOUNTER (OUTPATIENT)
Dept: PHYSICAL THERAPY | Age: 62
Setting detail: THERAPIES SERIES
Discharge: HOME | End: 2022-03-28
Payer: COMMERCIAL

## 2022-03-28 VITALS — SYSTOLIC BLOOD PRESSURE: 142 MMHG | DIASTOLIC BLOOD PRESSURE: 93 MMHG | OXYGEN SATURATION: 97 %

## 2022-03-28 PROCEDURE — 97110 THERAPEUTIC EXERCISES: CPT

## 2022-03-28 ASSESSMENT — PAIN SCALES - GENERAL: PAINLEVEL_OUTOF10: 4

## 2022-03-28 ASSESSMENT — PAIN DESCRIPTION - PROGRESSION: CLINICAL_PROGRESSION: GRADUALLY WORSENING

## 2022-03-28 ASSESSMENT — PAIN DESCRIPTION - FREQUENCY: FREQUENCY: CONTINUOUS

## 2022-03-28 ASSESSMENT — PAIN DESCRIPTION - LOCATION: LOCATION: RIB CAGE

## 2022-03-28 ASSESSMENT — PAIN DESCRIPTION - ONSET: ONSET: AWAKENED FROM SLEEP

## 2022-03-28 NOTE — PROGRESS NOTES
Physical Therapy  Daily Treatment Note  Date: 3/28/2022  Patient Name: Carmella Quiñones  MRN: [id number]     :   1960    Subjective:   General  Chart Reviewed: Yes  Additional Pertinent Hx: 65year old male referred to PT with diagnosis of chronic midline thoracic back pain. Recent MRI earlier this month showed spinal curve and alignment normal, vertebral body heights normal, moderate chronic degenerative changes in the form ofosteophytes and narrowing of the disc spaces, more pronounced in the lower thoracic spine.   Response To Previous Treatment: Not applicable  Referring Practitioner: MARTIN Kennedy  PT Visit Information  PT Insurance Information: Camille Flynn [address] [facility]), no auth  Total # of Visits Approved: 12  Total # of Visits to Date: 2  Progress Note Due Date: 22  Subjective  Subjective: He rates pain at 4/10  Pain Screening  Patient Currently in Pain: Yes  Pain Assessment  Pain Assessment: 0-10  Pain Level: 4  Pain Location: Rib cage  Pain Frequency: Continuous  Pain Onset: Awakened from sleep  Clinical Progression: Gradually worsening  Vital Signs  BP: (!) 142/93  BP Location: Right upper arm  Patient Position: Sitting  Patient Currently in Pain: Yes  Oxygen Therapy  SpO2: 97 %       Treatment Activities:                                    Exercises  Exercise 2: sitting slouch overcorrect--10 reps, slow segmental movement starting from neck and downward  Exercise 3: corner stretch--4 reps, 15 second hold  Exercise 4: rhomboid stretch on pole--4 reps, 15 second hold  Exercise 5: scapular retraction with t-band--15 reps, green  Exercise 6: shoulder ER maintaining scapular retraction (one side at a time)  Exercise 7: supine overhead cane stretch--4 reps, 15 second hold at end  Exercise 8: supine arm raises (\"W's\"), 15  Exercise 9: supine bilateral shoulder ER (shoulder abducted to 90 deg, neutral to ER)  10 reps  Exercise 10: supine shoulder protraction with cane, 5#, 10 reps  Exercise 11: lower trunk rotation stretch to right side--4 reps, 10 sec hold  Exercise 12: supine left quadratus stretch to right-- 4 reps, 10 sec hold  Exercise 13: sidelying bilateral \"open book\"--10 reps, 5 sec hold at end range  Exercise 14: supine lying on thoracic pad   Not today  Exercise 15: prayer stretch with motion to right side  Not today  Exercise 16: bilateral calf stretches (gastroc/soleus)--4 reps each, 10 sec hold  Exercise 17: heel raises, 10 reps  Exercise 18: I's and t's with band (yellow to start, then advance if tolerated)--10 reps  not today  Exercise 19: L stretch, left arm overhead, right out to side, 10 sec hold, 4 reps  Exercise 20: bower's pose, left arm on bow, right pulling back, 10 sec hold, 4 reps                               Assessment:   Conditions Requiring Skilled Therapeutic Intervention  Assessment: He rates pan at 4/10 upon arrival.  He performs ther ex as per flow shee. Pain is 2/10 at completion of PT session today. Prognosis: Fair  Decision Making: Medium Complexity  REQUIRES PT FOLLOW UP: Yes    Goals:  Short term goals  Time Frame for Short term goals: 3-4 weeks  Short term goal 1: Patient to be independent with HEP. Short term goal 2: Patient to report reduction in lower rib radicular symptoms. 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 Long term goals  Time Frame for Long term goals : 4-6 weeks  Long term goal 1: Patient able to perform forward and overhead reach without constant cramping. Long term goal 2: Patient to report being able to carry groceries and medium size weight with less pain/cramping. Long term goal 3: Patient to score <= 19% impairment on the Oswestry Disability Questionnaire.   Patient Goals   Patient goals : Decreased rib pain, decreased muscle cramping, be able to return back to normal work activites.   Plan:    Plan  Times per week: 2 x per week  Plan weeks: 4-6 weeks  Current Treatment Recommendations: Strengthening,ROM,Home Exercise Program,Patient/Caregiver Education & Training,Modalities,Positioning,Pain Management,Manual Therapy - Joint Manipulation,Manual Therapy - Soft Tissue Mobilization  Timed Code Treatment Minutes: 60 Minutes     Therapy Time   Individual Concurrent Group Co-treatment   Time In 2286         Time Out 2883         Minutes 60         Timed Code Treatment Minutes: 60 Minutes  Electronically signed by Paulina Badillo PT on 3/28/2022 at 4:01 PM

## 2022-03-29 ENCOUNTER — OFFICE VISIT (OUTPATIENT)
Dept: PRIMARY CARE CLINIC | Age: 62
End: 2022-03-29
Payer: COMMERCIAL

## 2022-03-29 VITALS
OXYGEN SATURATION: 97 % | HEART RATE: 83 BPM | DIASTOLIC BLOOD PRESSURE: 82 MMHG | BODY MASS INDEX: 32.44 KG/M2 | SYSTOLIC BLOOD PRESSURE: 158 MMHG | TEMPERATURE: 98 F | HEIGHT: 69 IN | WEIGHT: 219 LBS

## 2022-03-29 DIAGNOSIS — Z79.899 DRUG THERAPY: ICD-10-CM

## 2022-03-29 DIAGNOSIS — I10 ESSENTIAL HYPERTENSION: Primary | ICD-10-CM

## 2022-03-29 DIAGNOSIS — M54.6 CHRONIC MIDLINE THORACIC BACK PAIN: ICD-10-CM

## 2022-03-29 DIAGNOSIS — G89.29 CHRONIC MIDLINE THORACIC BACK PAIN: ICD-10-CM

## 2022-03-29 PROCEDURE — 99214 OFFICE O/P EST MOD 30 MIN: CPT | Performed by: NURSE PRACTITIONER

## 2022-03-29 PROCEDURE — 80305 DRUG TEST PRSMV DIR OPT OBS: CPT | Performed by: NURSE PRACTITIONER

## 2022-03-29 RX ORDER — LISINOPRIL 20 MG/1
TABLET ORAL
Qty: 90 TABLET | Refills: 1 | Status: SHIPPED | OUTPATIENT
Start: 2022-03-29 | End: 2022-06-01 | Stop reason: SDUPTHER

## 2022-03-29 ASSESSMENT — ENCOUNTER SYMPTOMS
BACK PAIN: 1
RESPIRATORY NEGATIVE: 1
EYES NEGATIVE: 1
GASTROINTESTINAL NEGATIVE: 1

## 2022-03-29 ASSESSMENT — PATIENT HEALTH QUESTIONNAIRE - PHQ9
SUM OF ALL RESPONSES TO PHQ QUESTIONS 1-9: 2
SUM OF ALL RESPONSES TO PHQ QUESTIONS 1-9: 2
1. LITTLE INTEREST OR PLEASURE IN DOING THINGS: 1
SUM OF ALL RESPONSES TO PHQ QUESTIONS 1-9: 2
SUM OF ALL RESPONSES TO PHQ QUESTIONS 1-9: 2
SUM OF ALL RESPONSES TO PHQ9 QUESTIONS 1 & 2: 2
2. FEELING DOWN, DEPRESSED OR HOPELESS: 1

## 2022-03-29 NOTE — PROGRESS NOTES
[address] [facility]  [id number] [place] [address]  Dept: [phone]  Dept Fax: [phone]  Loc: [phone]    Najma Calderon is a 65 y.o. male who presents today for his medical conditions/complaints as noted below. Najma Calderon is c/o of Discuss Medications (hydroxzyine is helping), Discuss Labs (back MRI), Back Pain (back spasms), and Hypertension      Chief Complaint   Patient presents with    Discuss Medications     hydroxzyine is helping    Discuss Labs     back MRI    Back Pain     back spasms    Hypertension       HPI:     HPI  Patient is here to discuss medications and back MRI. He states that he is still having the back spasms. Patient did start PT yesterday. Patient also was noted to have osteopenia as well. Pt is here for htn as well. bp in office today is 158/82 and 155/80. He has been monitoring BP at home and it has been running a little high at home.          Past Medical History:   Diagnosis Date    Backache, unspecified     Artis's esophagus     Bleeds easily (HCC)     Diseases of lips     Esophageal reflux     Glossitis     Hematospermia     Hoarseness     Insomnia, unspecified     Mononeuritis of unspecified site     Neck pain     Neuropathy     nate feet    Other abnormal clinical finding     Other chronic pain     Other malaise and fatigue     Other specified disorders of breast     Sore throat     Unspecified essential hypertension     Urinary tract infection, site not specified         Past Surgical History:   Procedure Laterality Date    APPENDECTOMY      CHOLECYSTECTOMY      COLONOSCOPY      Dr Nelly Crow @ [facility]-normal per patient    COLONOSCOPY  03/07/2014    Dr Allison Keller:  HP, 5y recall    COLONOSCOPY N/A 03/22/2021    Dr Ramiro Fonseca yr recall   [place]      Dr Nelly Crow @ [facility]-Artis's     UPPER GASTROINTESTINAL ENDOSCOPY  02/19/2014    Dr Allison Keller:  Biopsy for Artis's surveillance inadequate for evaluation. 3 year repeat    UPPER GASTROINTESTINAL ENDOSCOPY N/A 08/06/2019    Dr Estefany Kapoor over wire-55 F-Gastropathy, gastric polyps, esophagitis-No recall    UPPER GASTROINTESTINAL ENDOSCOPY  08/06/2019    Dr Estefany Kapoor over wire-55 F-Gastropathy, gastric polyps, esophagitis-No recall       Social History     Tobacco Use    Smoking status: Never Smoker    Smokeless tobacco: Never Used   Substance Use Topics    Alcohol use: No        Current Outpatient Medications   Medication Sig Dispense Refill    lisinopril (PRINIVIL;ZESTRIL) 20 MG tablet TAKE ONE TABLET ONE TIME DAILY 90 tablet 1    omeprazole (PRILOSEC) 40 MG delayed release capsule TAKE ONE CAPSULE TWO TIMES A  capsule 3    tiZANidine (ZANAFLEX) 4 MG tablet TAKE ONE TABLET BY MOUTH EVERY 6 HOURS AS NEEDED FOR MUSCLE SPASMS 60 tablet 0    gabapentin (NEURONTIN) 600 MG tablet TAKE ONE TABLET TWO TIMES A  tablet 0    gabapentin (NEURONTIN) 100 MG capsule Take 1 capsule by mouth daily for 30 days. 30 capsule 3    meloxicam (MOBIC) 15 MG tablet Take 1 tablet by mouth daily 90 tablet 3    montelukast (SINGULAIR) 10 MG tablet TAKE ONE TABLET BY MOUTH ONE TIME DAILY 90 tablet 3    terbinafine (LAMISIL) 1 % cream Apply topically 2 times daily. 28 g 0    Multiple Vitamins-Minerals (MULTIVITAMIN ADULT PO) Take by mouth daily      Fexofenadine HCl (ALLEGRA PO) Take by mouth daily        No current facility-administered medications for this visit.        Allergies   Allergen Reactions    Iodine Rash     Skin peels, feet and hands turn red       Family History   Problem Relation Age of Onset    Diabetes Mother     Diabetes Father     Heart Disease Father     Crohn's Disease Daughter     Crohn's Disease Other     Colon Cancer Neg Hx     Colon Polyps Neg Hx     Liver Cancer Neg Hx     Stomach Cancer Neg Hx     Rectal Cancer Neg Hx     Esophageal Cancer Neg Hx     Liver Disease Neg Hx          Subjective:      Review of Systems   Constitutional: Negative. HENT: Negative. Eyes: Negative. Respiratory: Negative. Cardiovascular: Negative. Gastrointestinal: Negative. Endocrine: Negative. Genitourinary: Negative. Musculoskeletal: Positive for back pain. Skin: Negative. Neurological: Negative. Hematological: Negative. Psychiatric/Behavioral: Negative. Objective:     Physical Exam  Vitals and nursing note reviewed. Constitutional:       Appearance: Normal appearance. HENT:      Head: Normocephalic and atraumatic. Right Ear: Hearing, tympanic membrane, ear canal and external ear normal.      Left Ear: Hearing, tympanic membrane, ear canal and external ear normal.      Nose: Nose normal.      Mouth/Throat:      Lips: Pink. Mouth: Mucous membranes are moist.      Pharynx: Oropharynx is clear. Eyes:      General: Lids are normal.      Extraocular Movements: Extraocular movements intact. Conjunctiva/sclera: Conjunctivae normal.      Pupils: Pupils are equal, round, and reactive to light. Neck:      Thyroid: No thyromegaly. Cardiovascular:      Rate and Rhythm: Normal rate and regular rhythm. Pulses: Normal pulses. Dorsalis pedis pulses are 2+ on the right side and 2+ on the left side. Posterior tibial pulses are 2+ on the right side and 2+ on the left side. Heart sounds: Normal heart sounds. Pulmonary:      Effort: Pulmonary effort is normal.      Breath sounds: Normal breath sounds and air entry. Abdominal:      General: Bowel sounds are normal.      Palpations: Abdomen is soft. Musculoskeletal:      Cervical back: Neck supple. Tenderness present. Decreased range of motion. Thoracic back: Tenderness present. Decreased range of motion. Lumbar back: No tenderness. Decreased range of motion. Lymphadenopathy:      Cervical: No cervical adenopathy. Skin:     General: Skin is warm and dry. Capillary Refill: Capillary refill takes less than 2 seconds. Neurological:      General: No focal deficit present. Mental Status: He is alert and oriented to person, place, and time. Mental status is at baseline. Coordination: Coordination is intact. Psychiatric:         Mood and Affect: Mood normal.         Speech: Speech normal.         Behavior: Behavior normal.         Thought Content: Thought content normal.         Cognition and Memory: Cognition and memory normal.         Judgment: Judgment normal.         BP (!) 158/82 (Site: Left Upper Arm)   Pulse 83   Temp 98 °F (36.7 °C)   Ht 5' 9\" (1.753 m)   Wt 219 lb (99.3 kg)   SpO2 97%   BMI 32.34 kg/m²     Assessment:      Diagnosis Orders   1. Essential hypertension  lisinopril (PRINIVIL;ZESTRIL) 20 MG tablet   2. Chronic midline thoracic back pain     3. Drug therapy  POCT Rapid Drug Screen       Results for orders placed or performed in visit on 03/29/22   POCT Rapid Drug Screen   Result Value Ref Range    Alcohol, Urine -     Amphetamine Screen, Urine -     Barbiturate Screen, Urine -     Benzodiazepine Screen, Urine -     Buprenorphine Urine -     Cocaine Metabolite Screen, Urine -     FENTANYL SCREEN, URINE -     Gabapentin Screen, Urine -     MDMA, Urine -     Methadone Screen, Urine -     Methamphetamine, Urine -     Opiate Scrn, Ur -     Oxycodone Screen, Ur -     PCP Screen, Urine -     Propoxyphene Screen, Urine -     Synthetic Cannabinoids (K2) Screen, Urine -     THC Screen, Urine -     Tramadol Scrn, Ur -     Tricyclic Antidepressants, Urine -        Plan:     1. Essential hypertension - worsening  Increasing lisinopril up to 20 mg daily. - lisinopril (PRINIVIL;ZESTRIL) 20 MG tablet; TAKE ONE TABLET ONE TIME DAILY  Dispense: 90 tablet; Refill: 1    2. Chronic midline thoracic back pain - worsening   Patient is to continue PT for back pain. He is wanting to have second opinion at another neurosurgery.   [id number] Daphnie Roman for this referral to be placed if needed. 3. Drug therapy    - POCT Rapid Drug Screen     Ok for Zynex    Return in about 6 weeks (around 5/10/2022) for HTN, thoracic pain. Orders Placed This Encounter   Procedures    POCT Rapid Drug Screen       Orders Placed This Encounter   Medications    lisinopril (PRINIVIL;ZESTRIL) 20 MG tablet     Sig: TAKE ONE TABLET ONE TIME DAILY     Dispense:  90 tablet     Refill:  1            Patient offered educational handouts and has had all questions answered. Patient voices understanding and agrees to plans along with risks and benefits of plan. Patient is instructed to continue prior meds, diet, and exercise plans as instructed. Patient agrees to follow up as instructed and sooner if needed. Patient agrees to go to ER if condition becomes emergent. EMR Dragon/transcription disclaimer: Some of this encounter note is an electronic transcription/translation of spoken language to printed text. The electronic translation of spoken language may permit erroneous, or at times, nonsensical words or phrases to be inadvertently transcribed.  Although I have reviewed the note for such errors, some may still exist.    Electronically signed by MARTIN Aguillon on 4/4/2022 at 10:15 PM

## 2022-03-30 ENCOUNTER — HOSPITAL ENCOUNTER (OUTPATIENT)
Dept: PHYSICAL THERAPY | Age: 62
Setting detail: THERAPIES SERIES
Discharge: HOME | End: 2022-03-30
Payer: COMMERCIAL

## 2022-03-30 VITALS — DIASTOLIC BLOOD PRESSURE: 87 MMHG | HEART RATE: 81 BPM | SYSTOLIC BLOOD PRESSURE: 143 MMHG

## 2022-03-30 PROCEDURE — 97110 THERAPEUTIC EXERCISES: CPT

## 2022-03-30 ASSESSMENT — PAIN DESCRIPTION - FREQUENCY: FREQUENCY: INTERMITTENT

## 2022-03-30 ASSESSMENT — PAIN DESCRIPTION - PROGRESSION: CLINICAL_PROGRESSION: GRADUALLY WORSENING

## 2022-03-30 ASSESSMENT — PAIN DESCRIPTION - ONSET: ONSET: AWAKENED FROM SLEEP

## 2022-03-30 ASSESSMENT — PAIN SCALES - GENERAL: PAINLEVEL_OUTOF10: 3

## 2022-03-30 NOTE — PROGRESS NOTES
Physical Therapy  Daily Treatment Note  Date: 3/30/2022  Patient Name: Carlos Nunn  MRN: [id number]     :   1960    Subjective:   General  Chart Reviewed: Yes  Additional Pertinent Hx: 65year old male referred to PT with diagnosis of chronic midline thoracic back pain. Recent MRI earlier this month showed spinal curve and alignment normal, vertebral body heights normal, moderate chronic degenerative changes in the form ofosteophytes and narrowing of the disc spaces, more pronounced in the lower thoracic spine. Response To Previous Treatment: Not applicable  Referring Practitioner: MARTIN Contreras  PT Visit Information  PT Insurance Information: Jessica [facility]), no auth  Total # of Visits Approved: 12  Total # of Visits to Date: 3  Progress Note Due Date: 22  Subjective  Subjective: He rates pain at 3/10.   Pain Screening  Patient Currently in Pain: Yes  Pain Assessment  Pain Assessment: 0-10  Pain Level: 3  Pain Frequency: Intermittent  Pain Onset: Awakened from sleep  Clinical Progression: Gradually worsening  Vital Signs  Pulse: 81  BP: (!) 143/87  Patient Currently in Pain: Yes       Treatment Activities:                                    Exercises  Exercise 1: ++ patient has frequent spasms in arms and legs even with light resistance, keep reps low initially and gradually advance++  Exercise 2: sitting slouch overcorrect--10 reps, slow segmental movement starting from neck and downward  Exercise 3: corner stretch--4 reps, 15 second hold  Exercise 4: rhomboid stretch on pole--4 reps, 15 second hold  Exercise 5: scapular retraction with t-band--15 reps, green  Exercise 6: shoulder ER maintaining scapular retraction (one side at a time), 15 reps  Exercise 7: supine overhead cane stretch--4 reps, 15 second hold at end  Exercise 8: supine arm raises (\"W's\"), 15  Exercise 9: supine bilateral shoulder ER (shoulder abducted to 90 deg, neutral to ER)  10 reps  Exercise 10: supine shoulder protraction with cane, 5#, 15 reps  Exercise 11: lower trunk rotation stretch to right side--4 reps, 10 sec hold  Exercise 12: supine left quadratus stretch to right-- 4 reps, 10 sec hold  Exercise 13: sidelying bilateral \"open book\"--10 reps, 5 sec hold at end range  Exercise 14: supine lying on thoracic pad   Not today  Exercise 15: prayer stretch with motion to right side  5 reps  Exercise 16: bilateral calf stretches (gastroc/soleus)--4 reps each, 10 sec hold  Exercise 17: heel raises, 10 reps  Exercise 18: I's and t's with band (yellow to start, then advance if tolerated)--10 reps  Exercise 19: L stretch, left arm overhead, right out to side, 10 sec hold, 4 reps  Exercise 20: bower's pose, left arm on bow, right pulling back, 10 sec hold, 4 reps                               Assessment:   Conditions Requiring Skilled Therapeutic Intervention  Assessment: He rates pain at 3/10 upon arrival.   He performs the ex as per flow sheet. Pain at end of PT session is 3/10. Prognosis: Fair  Decision Making: Medium Complexity  REQUIRES PT FOLLOW UP: Yes  Discharge Recommendations: Continue to assess pending progress    Goals:  Short term goals  Time Frame for Short term goals: 3-4 weeks  Short term goal 1: Patient to be independent with HEP. Short term goal 2: Patient to report reduction in lower rib radicular symptoms. 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 Long term goals  Time Frame for Long term goals : 4-6 weeks  Long term goal 1: Patient able to perform forward and overhead reach without constant cramping. Long term goal 2: Patient to report being able to carry groceries and medium size weight with less pain/cramping. Long term goal 3: Patient to score <= 19% impairment on the Oswestry Disability Questionnaire.   Patient Goals   Patient goals : Decreased rib pain, decreased muscle cramping, be able to return back to normal work activites.   Plan:    Plan  Times per week: 2 x per week  Plan weeks: 4-6 weeks  Current Treatment Recommendations: Strengthening,ROM,Home Exercise Program,Patient/Caregiver Education & Training,Modalities,Positioning,Pain Management,Manual Therapy - Joint Manipulation,Manual Therapy - Soft Tissue Mobilization  Timed Code Treatment Minutes: 46 Minutes     Therapy Time   Individual Concurrent Group Co-treatment   Time In 1400         Time Out 1348         Minutes 46         Timed Code Treatment Minutes: 46 Minutes  Electronically signed by Eyad Danielle PT on 3/30/2022 at 4:18 PM

## 2022-04-04 ENCOUNTER — HOSPITAL ENCOUNTER (OUTPATIENT)
Dept: PHYSICAL THERAPY | Age: 62
Setting detail: THERAPIES SERIES
Discharge: HOME | End: 2022-04-04
Payer: COMMERCIAL

## 2022-04-04 PROCEDURE — 97110 THERAPEUTIC EXERCISES: CPT

## 2022-04-04 ASSESSMENT — PAIN DESCRIPTION - LOCATION: LOCATION: BACK

## 2022-04-04 ASSESSMENT — PAIN DESCRIPTION - DESCRIPTORS: DESCRIPTORS: ACHING;SORE

## 2022-04-04 ASSESSMENT — PAIN SCALES - GENERAL: PAINLEVEL_OUTOF10: 4

## 2022-04-04 ASSESSMENT — PAIN DESCRIPTION - PAIN TYPE: TYPE: CHRONIC PAIN

## 2022-04-04 ASSESSMENT — PAIN DESCRIPTION - PROGRESSION: CLINICAL_PROGRESSION: GRADUALLY WORSENING

## 2022-04-04 ASSESSMENT — PAIN DESCRIPTION - ORIENTATION: ORIENTATION: MID;LOWER

## 2022-04-04 NOTE — PROGRESS NOTES
Daily Treatment Note  Date: 2022  Patient Name: John Perera  MRN: [id number]     :   1960    Subjective:   General  Chart Reviewed: Yes  Additional Pertinent Hx: 65year old male referred to PT with diagnosis of chronic midline thoracic back pain. Recent MRI earlier this month showed spinal curve and alignment normal, vertebral body heights normal, moderate chronic degenerative changes in the form ofosteophytes and narrowing of the disc spaces, more pronounced in the lower thoracic spine. 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 Pain Screening  Patient Currently in Pain: Yes  Pain Assessment  Pain Assessment: 0-10  Pain Level: 4  Pain Type: Chronic pain  Pain Location: Back  Pain Orientation: Mid;Lower  Pain Descriptors: Aching; Sore  Clinical Progression: Gradually worsening  Vital Signs  Patient Currently in Pain: Yes       Treatment Activities:   Exercises  Exercise 1: ++ patient has frequent spasms in arms and legs even with light resistance, keep reps low initially and gradually advance++  Exercise 2: sitting slouch overcorrect--10 reps, slow segmental movement starting from neck and downward  Exercise 3: corner stretch--4 reps, 15 second hold  Exercise 4: rhomboid stretch on pole--4 reps, 15 second hold  Exercise 5: scapular retraction with t-band--15 reps, green  Exercise 6: shoulder ER maintaining scapular retraction (one side at a time), 15 reps  Exercise 7: supine overhead cane stretch--5 reps, 15 second hold at end  Exercise 8: supine arm raises (\"W's\"), 15  Exercise 9: supine bilateral shoulder ER (shoulder abducted to 90 deg, neutral to ER)  15 reps  Exercise 10: supine shoulder protraction with cane, 5#, 15 reps  Exercise 11: lower trunk rotation stretch to right side--5 reps, 10 sec hold  Exercise 12: supine left quadratus stretch to right-- 5 reps, 10 sec hold; Decreased reps this date due to increased L HS crampness  Exercise 13: sidelying bilateral \"open book\"--10 reps, 5 sec hold at end range  Exercise 14: supine lying on thoracic pad   Not today  Exercise 15: prayer stretch with motion to right and L side 3 reps  Exercise 16: bilateral calf stretches (gastroc/soleus)--4 reps each, 10 sec hold  Exercise 17: heel raises, 15 reps  Exercise 18: I's and t's with band (yellow to start, then advance if tolerated)--15 reps  Exercise 19: L stretch, left arm overhead, right out to side, 10 sec hold, 4 reps  Exercise 20: bower's pose, left arm on bow, right pulling back, yellow x 10 sec hold, 4 reps    Assessment:   Conditions Requiring Skilled Therapeutic Intervention  Assessment: Pain is a little elevated today due to increased activity yesterday but patient is able to complete all exercises without issues. He had c/o L HS cramping during supine exercises with decreased reps performed of quad stretch. He tolerates increased reps/resistance well. Some cues required to ensure correct form/technique for max benefit. Will continue with current POC and progress as able. Prognosis: Fair  Decision Making: Medium Complexity  REQUIRES PT FOLLOW UP: Yes  Discharge Recommendations: Continue to assess pending progress      Goals:  Short term goals  Time Frame for Short term goals: 3-4 weeks  Short term goal 1: Patient to be independent with HEP. Short term goal 2: Patient to report reduction in lower rib radicular symptoms. 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   Long term goals  Time Frame for Long term goals : 4-6 weeks  Long term goal 1: Patient able to perform forward and overhead reach without constant cramping. Long term goal 2: Patient to report being able to carry groceries and medium size weight with less pain/cramping. Long term goal 3: Patient to score <= 19% impairment on the Oswestry Disability Questionnaire. Patient Goals   Patient goals : Decreased rib pain, decreased muscle cramping, be able to return back to normal work activites.     Plan:    Plan  Times per week: 2 x per week  Plan weeks: 4-6 weeks  Current Treatment Recommendations: Strengthening,ROM,Home Exercise Program,Patient/Caregiver Education & Training,Modalities,Positioning,Pain Management,Manual Therapy - Joint Manipulation,Manual Therapy - Soft Tissue Mobilization  Timed Code Treatment Minutes: 47 Minutes     Therapy Time   Individual Concurrent Group Co-treatment   Time In 9250         Time Out 6481         Minutes 54         Timed Code Treatment Minutes: 47 Minutes       Electronically signed by Shira Garrett PTA on 4/4/2022 at 4:11 PM

## 2022-04-05 ENCOUNTER — TELEPHONE (OUTPATIENT)
Dept: PRIMARY CARE CLINIC | Age: 62
End: 2022-04-05

## 2022-04-05 NOTE — TELEPHONE ENCOUNTER
Contacted patient to advise PCP will be moving to [address] [facility]. Provided patient with options of following provider or changing to new PCP in office. Patient is undecided and stated they would call back. Canceled appt and removed PCP.

## 2022-04-07 ENCOUNTER — HOSPITAL ENCOUNTER (OUTPATIENT)
Dept: PHYSICAL THERAPY | Age: 62
Setting detail: THERAPIES SERIES
Discharge: HOME | End: 2022-04-07
Payer: COMMERCIAL

## 2022-04-07 PROCEDURE — 97110 THERAPEUTIC EXERCISES: CPT

## 2022-04-07 RX ORDER — TIZANIDINE 4 MG/1
TABLET ORAL
Qty: 60 TABLET | Refills: 1 | Status: SHIPPED | OUTPATIENT
Start: 2022-04-07 | End: 2022-10-06 | Stop reason: SDUPTHER

## 2022-04-07 ASSESSMENT — PAIN DESCRIPTION - PAIN TYPE: TYPE: CHRONIC PAIN

## 2022-04-07 ASSESSMENT — PAIN DESCRIPTION - ORIENTATION: ORIENTATION: MID

## 2022-04-07 ASSESSMENT — PAIN DESCRIPTION - LOCATION: LOCATION: BACK;RIB CAGE

## 2022-04-07 ASSESSMENT — PAIN SCALES - GENERAL: PAINLEVEL_OUTOF10: 3

## 2022-04-07 NOTE — PROGRESS NOTES
Physical Therapy  Daily Treatment Note  Date: 2022  Patient Name: George Iyer  MRN: [id number]     :   1960    Subjective:   General  Referring Practitioner: MARTIN Galaviz  PT Insurance Information: Camille Flynn [address] [facility]), no auth  Total # of Visits Approved: 12  Total # of Visits to Date: 5  Progress Note Due Date: 22  Subjective: the pain is more in my mid back and around my ribs  Patient Currently in Pain: Yes  Pain Level: 3  Pain Type: Chronic pain  Pain Location: Back;Rib cage  Pain Orientation: Mid       Treatment Activities:   Exercises  Exercise 1: ++ patient has frequent spasms in arms and legs even with light resistance, keep reps low initially and gradually advance++  Exercise 2: sitting slouch overcorrect--10 reps, slow segmental movement starting from neck and downward  Exercise 3: corner stretch--4 reps, 15 second hold  Exercise 4: rhomboid stretch on pole--4 reps, 15 second hold  Exercise 5: scapular retraction with t-band--15 reps, green  Exercise 6: shoulder ER maintaining scapular retraction (one side at a time), 15 reps  Exercise 7: supine overhead cane stretch--5 reps, 15 second hold at end  Exercise 8: supine arm raises (\"W's\"), 15  Exercise 9: supine bilateral shoulder ER (shoulder abducted to 90 deg, neutral to ER)  15 reps  Exercise 10: supine shoulder protraction with cane, 5#, 15 reps  Exercise 11: lower trunk rotation stretch to right side--5 reps, 10 sec hold  Exercise 12: supine left quadratus stretch to right-- 5 reps, 10 sec hold  Exercise 13: sidelying bilateral \"open book\"--10 reps, 5 sec hold at end range  Exercise 14: supine lying on thoracic pad   x 2'  Exercise 15: prayer stretch with motion to right and L side 3 reps  x 10 sec  Exercise 16: bilateral calf stretches (gastroc/soleus)--4 reps each, 10 sec hold  Exercise 17: heel raises, 15 reps  Exercise 18: I's and t's with band (yellow to start, then advance if tolerated)--15 reps  Exercise 19: L stretch, left arm overhead, right out to side, 10 sec hold, 4 reps  Exercise 20: bower's pose, left arm on bow, right pulling back, yellow x 10 sec hold, 4 reps      Assessment:   Conditions Requiring Skilled Therapeutic Intervention  Assessment: Patient had l calf cramping performing L quadratus stretch and tactile cues during open book stretch to stabilize ship and allow more rotation in thoracic region. Added pivitol therapy stretch on thoracic pad this visit, will monitor his progress and advance routine accordingly. REQUIRES PT FOLLOW UP: Yes        Goals:  Short term goals  Time Frame for Short term goals: 3-4 weeks  Short term goal 1: Patient to be independent with HEP. Short term goal 2: Patient to report reduction in lower rib radicular symptoms. 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 Long term goals  Time Frame for Long term goals : 4-6 weeks  Long term goal 1: Patient able to perform forward and overhead reach without constant cramping. Long term goal 2: Patient to report being able to carry groceries and medium size weight with less pain/cramping. Long term goal 3: Patient to score <= 19% impairment on the Oswestry Disability Questionnaire. Patient Goals   Patient goals : Decreased rib pain, decreased muscle cramping, be able to return back to normal work activites.     Plan:    Times per week: 2 x per week  Plan weeks: 4-6 weeks  Current Treatment Recommendations: Strengthening,ROM,Home Exercise Program,Patient/Caregiver Education & Training,Modalities,Positioning,Pain Management,Manual Therapy - Joint Manipulation,Manual Therapy - Soft Tissue Mobilization        Therapy Time   Individual Concurrent Group Co-treatment   Time In [address]         Time Out Danny Engel [address], PTA    Electronically signed by Cayla Sherman Velia, PTA on 4/7/2022 at 3:09 PM

## 2022-04-07 NOTE — TELEPHONE ENCOUNTER
Michel Mckoy called to request a refill on his medication.       Last office visit : 3/29/2022   Next office visit : Visit date not found     Requested Prescriptions     Pending Prescriptions Disp Refills    tiZANidine (ZANAFLEX) 4 MG tablet [Pharmacy Med Name: TIZANIDINE HCL 4MG TABS] 60 tablet 0     Sig: TAKE ONE TABLET BY MOUTH EVERY 6 HOURS AS NEEDED FOR MUSCLE SPASMS            Karen Cadet MA

## 2022-04-11 ENCOUNTER — HOSPITAL ENCOUNTER (OUTPATIENT)
Dept: PHYSICAL THERAPY | Age: 62
Setting detail: THERAPIES SERIES
Discharge: HOME | End: 2022-04-11
Payer: COMMERCIAL

## 2022-04-11 PROCEDURE — 97110 THERAPEUTIC EXERCISES: CPT

## 2022-04-11 ASSESSMENT — PAIN DESCRIPTION - PAIN TYPE: TYPE: CHRONIC PAIN

## 2022-04-11 ASSESSMENT — PAIN DESCRIPTION - LOCATION: LOCATION: BACK;RIB CAGE;LEG

## 2022-04-11 ASSESSMENT — PAIN DESCRIPTION - ORIENTATION: ORIENTATION: MID;OUTER

## 2022-04-11 ASSESSMENT — PAIN SCALES - GENERAL: PAINLEVEL_OUTOF10: 6

## 2022-04-11 NOTE — PROGRESS NOTES
Daily Treatment Note  Date: 2022  Patient Name: Angie Lnyn  MRN: [id number]     :   1960    Subjective:   General  Chart Reviewed: Yes  Response To Previous Treatment: Patient with no complaints from previous session. Referring Practitioner: MARTIN Schaefer  PT Visit Information  PT Insurance Information: Camille Flynn [facility]), no auth  Total # of Visits Approved: 12  Total # of Visits to Date: 6  Progress Note Due Date: 22  Subjective  Subjective: I am hurting today, threw some tires out of my truck earlier and I am hurting.   Pain Screening  Patient Currently in Pain: Yes  Pain Assessment  Pain Assessment: 0-10  Pain Level: 6  Pain Type: Chronic pain  Pain Location: Back;Rib cage;Leg  Pain Orientation: Mid;Outer  Pain Descriptors: Aching;Sore;Tightness;Cramping  Vital Signs  Patient Currently in Pain: Yes       Treatment Activities:   Exercises  Exercise 1: ++ patient has frequent spasms in arms and legs even with light resistance, keep reps low initially and gradually advance++  Exercise 2: sitting slouch overcorrect--10 reps, slow segmental movement starting from neck and downward  Exercise 3: corner stretch--5 reps, 15 second hold  Exercise 4: rhomboid stretch on pole--4 reps, 15 second hold  Exercise 5: scapular retraction with t-band--15 reps, green  Exercise 6: shoulder ER maintaining scapular retraction (one side at a time), 15 reps  Exercise 7: supine overhead cane stretch--5 reps, 15 second hold at end  Exercise 8: supine arm raises (\"W's\"), 15  Exercise 9: supine bilateral shoulder ER (shoulder abducted to 90 deg, neutral to ER)  15 reps  Exercise 10: supine shoulder protraction with cane, 5#, 15 reps  Exercise 11: lower trunk rotation stretch to right side--5 reps, 10 sec hold  Exercise 12: supine left quadratus stretch to right-- 5 reps, 10 sec hold  Exercise 13: sidelying bilateral \"open book\"--10 reps, 5 sec hold at end range  Exercise 14: supine lying on thoracic pad   x 2'  Exercise 15: prayer stretch with motion to right and L side 3 reps  x 10 sec NOT Today  Exercise 16: bilateral calf stretches (gastroc/soleus)--5 reps each, 10 sec hold  Exercise 17: heel raises, 15 reps  Exercise 18: I's and t's with band (yellow to start, then advance if tolerated)--15 reps  Exercise 19: L stretch, left arm overhead, right out to side, 10 sec hold, 5 reps  Exercise 20: bower's pose, left arm on bow, right pulling back, red x 10 sec hold, 5 reps    Assessment:   Conditions Requiring Skilled Therapeutic Intervention  Assessment: Patient with increased pain this date with noted increased cramps in his back and legs throughout treatment. Holding off on prayer stretch this date due to increased pain. He is still able to tolerate all other exercises with little improvement in symptoms after tx. Will continue with current POC and progress as able. REQUIRES PT FOLLOW UP: Yes      Goals:  Short term goals  Time Frame for Short term goals: 3-4 weeks  Short term goal 1: Patient to be independent with HEP. Short term goal 2: Patient to report reduction in lower rib radicular symptoms. 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 Long term goals  Time Frame for Long term goals : 4-6 weeks  Long term goal 1: Patient able to perform forward and overhead reach without constant cramping. Long term goal 2: Patient to report being able to carry groceries and medium size weight with less pain/cramping. Long term goal 3: Patient to score <= 19% impairment on the Oswestry Disability Questionnaire. Patient Goals   Patient goals : Decreased rib pain, decreased muscle cramping, be able to return back to normal work activites.     Plan:    Plan  Times per week: 2 x per week  Plan weeks: 4-6 weeks  Current Treatment Recommendations: [facility] Exercise Program,Patient/Caregiver Education & Training,Modalities,Positioning,Pain Management,Manual Therapy - Joint Manipulation,Manual Therapy - Soft Tissue Mobilization  Timed Code Treatment Minutes: 52 Minutes     Therapy Time   Individual Concurrent Group Co-treatment   Time In 7823         Time Out 1500         Minutes 47         Timed Code Treatment Minutes: 52 Minutes       Electronically signed by Pablito Llamas PTA on 4/11/2022 at 4:05 PM

## 2022-04-14 ENCOUNTER — HOSPITAL ENCOUNTER (OUTPATIENT)
Dept: PHYSICAL THERAPY | Age: 62
Setting detail: THERAPIES SERIES
Discharge: HOME | End: 2022-04-14
Payer: COMMERCIAL

## 2022-04-14 PROCEDURE — 97110 THERAPEUTIC EXERCISES: CPT

## 2022-04-14 ASSESSMENT — PAIN SCALES - GENERAL: PAINLEVEL_OUTOF10: 3

## 2022-04-14 ASSESSMENT — PAIN DESCRIPTION - ORIENTATION: ORIENTATION: MID;OUTER

## 2022-04-14 ASSESSMENT — PAIN DESCRIPTION - PAIN TYPE: TYPE: CHRONIC PAIN

## 2022-04-14 ASSESSMENT — PAIN DESCRIPTION - LOCATION: LOCATION: BACK;RIB CAGE;LEG

## 2022-04-14 ASSESSMENT — PAIN DESCRIPTION - PROGRESSION: CLINICAL_PROGRESSION: NOT CHANGED

## 2022-04-14 NOTE — PROGRESS NOTES
Daily Treatment Note  Date: 2022  Patient Name: Betty Laguerre  MRN: [id number]     :   1960    Subjective:   General  Chart Reviewed: Yes  Response To Previous Treatment: Patient with no complaints from previous session. Referring Practitioner: MARTIN Benavides  PT Visit Information  PT Insurance Information: [facility]), no auth  Total # of Visits Approved: 12  Total # of Visits to Date: 7  Progress Note Due Date: 22  Subjective  Subjective: I am better than I was last time but still get those spasms and tightness anytime I try to do anything.   Pain Screening  Patient Currently in Pain: Yes  Pain Assessment  Pain Assessment: 0-10  Pain Level: 3  Pain Type: Chronic pain  Pain Location: Back;Rib cage;Leg  Pain Orientation: Mid;Outer  Pain Descriptors: Aching;Sore;Tightness;Cramping  Clinical Progression: Not changed  Vital Signs  Patient Currently in Pain: Yes       Treatment Activities:     Exercises  Exercise 1: ++ patient has frequent spasms in arms and legs even with light resistance, keep reps low initially and gradually advance++ HEP GIVEN 2022  Exercise 2: sitting slouch overcorrect--10 reps, slow segmental movement starting from neck and downward  Exercise 3: corner stretch--5 reps, 15 second hold  Exercise 4: rhomboid stretch on pole--5 reps, 15 second hold  Exercise 5: scapular retraction with t-band--15 reps, green  Exercise 6: shoulder ER maintaining scapular retraction (one side at a time), green x 15 reps  Exercise 7: supine overhead cane stretch--5 reps, 15 second hold at end  Exercise 8: supine arm raises (\"W's\"), 15  Exercise 9: supine bilateral shoulder ER (shoulder abducted to 90 deg, neutral to ER)  15 reps 2#  Exercise 10: supine shoulder protraction with cane, 5#, 15 reps  Exercise 11: lower trunk rotation stretch to right side--5 reps, 10 sec hold  Exercise 12: supine left quadratus stretch to right-- 5 reps, 10 sec hold  Exercise 13: sidelying bilateral \"open book\"--10 reps, 5 sec hold at end range  Exercise 14: supine lying on thoracic pad x 3'  Exercise 15: prayer stretch with motion to right and L side 3 reps  x 10 sec  Exercise 16: bilateral calf stretches (gastroc/soleus)--5 reps each, 10 sec hold  Exercise 17: heel raises, 20 reps  Exercise 18: I's and t's with band (yellow to start, then advance if tolerated)--green x15 reps  Exercise 19: L stretch, left arm overhead, right out to side, 10 sec hold, 5 reps  Exercise 20: bower's pose, left arm on bow, right pulling back, red x 10 sec hold, 5 reps     Assessment:   Conditions Requiring Skilled Therapeutic Intervention  Body structures, Functions, Activity limitations: Decreased functional mobility ; Increased pain;Decreased posture;Decreased ROM  Assessment: Patient presents with decreased pain this date compared to previous visits and is able to complete supine exercises with decreased cramps. Increased weight and reps of some exercises this date with patient having no c/o increased symptoms. HEP given this date with written instructions and green theraband. Will continue with current POC to further address his impairments and improve function. REQUIRES PT FOLLOW UP: Yes      Goals:  Short term goals  Time Frame for Short term goals: 3-4 weeks  Short term goal 1: Patient to be independent with HEP. Short term goal 2: Patient to report reduction in lower rib radicular symptoms. 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 Long term goals  Time Frame for Long term goals : 4-6 weeks  Long term goal 1: Patient able to perform forward and overhead reach without constant cramping. Long term goal 2: Patient to report being able to carry groceries and medium size weight with less pain/cramping.   Long term goal 3: Patient to score <= 19% impairment on the Oswestry Disability Questionnaire. Patient Goals   Patient goals : Decreased rib pain, decreased muscle cramping, be able to return back to normal work activites.     Plan:    Plan  Times per week: 2 x per week  Plan weeks: 4-6 weeks  Current Treatment Recommendations: Strengthening,ROM,Home Exercise Program,Patient/Caregiver Education & Training,Modalities,Positioning,Pain Management,Manual Therapy - Joint Manipulation,Manual Therapy - Soft Tissue Mobilization  Timed Code Treatment Minutes: 62 Minutes     Therapy Time   Individual Concurrent Group Co-treatment   Time In 7715         Time Out 1500         Minutes 58         Timed Code Treatment Minutes: 62 Minutes       Electronically signed by Matt Juan PTA on 4/14/2022 at 3:50 PM PAST MEDICAL HISTORY:  No pertinent past medical history

## 2022-04-15 NOTE — PROGRESS NOTES
[facility]  OUTPATIENT PHYSICAL THERAPY  DISCHARGE SUMMARY    Date: 4/15/2022  Patient Name: Prasanth Oliveira        MRN: [id number]    ACCOUNT #: [de-identified]  : 1960  (65 y.o.)  Gender: male   Referring Practitioner: MARTIN Gonzalez  Diagnosis: chronic back pain G89.29  Referral Date : 22       Total # of Visits Approved: 12  Total # of Visits to Date: 7    Subjective  Subjective: I am better than I was last time but still get those spasms and tightness anytime I try to do anything. (Comment made at last session attended 22). Additional Pertinent Hx: 65year old male referred to PT with diagnosis of chronic midline thoracic back pain. Recent MRI earlier this month showed spinal curve and alignment normal, vertebral body heights normal, moderate chronic degenerative changes in the form ofosteophytes and narrowing of the disc spaces, more pronounced in the lower thoracic spine. Objective  Treatments received included Strengthening,ROM,Home Exercise Program,Patient/Caregiver Education & Training,Modalities,Positioning,Pain Management,Manual Therapy - Joint Manipulation,Manual Therapy - Soft Tissue Mobilization  See objective/subjective data in goals    Assessment  Assessment: At the last PT session attended 4/15/22, patient presented with decreased pain that date compared to previous visits and was able to complete supine exercises with decreased cramps. Increased weight and reps of some exercises that date with patient having no c/o increased symptoms. HEP issued , with written instructions and green theraband issued. Mr. Claudene Sandhoff called back today (4/15) and stated that he did not believe that therapy is helping. He has his HEP and can independently continue with them if he so desires. Will discharge from PT today as per his request.           Plan: Discharge from PT today.            Goals  Short term goals  Time Frame for Short term goals: 3-4 weeks  Short term goal 1: Patient to be independent with HEP. Short term goal 2: Patient to report reduction in lower rib radicular symptoms. 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 Long term goals  Time Frame for Long term goals : 4-6 weeks  Long term goal 1: Patient able to perform forward and overhead reach without constant cramping. Long term goal 2: Patient to report being able to carry groceries and medium size weight with less pain/cramping. Long term goal 3: Patient to score <= 19% impairment on the Oswestry Disability Questionnaire.          Electronically signed by Rand Baum PT on 4/15/2022 at 2:10 PM

## 2022-06-01 ENCOUNTER — TELEPHONE (OUTPATIENT)
Dept: FAMILY MEDICINE CLINIC | Age: 62
End: 2022-06-01

## 2022-06-01 DIAGNOSIS — I10 ESSENTIAL HYPERTENSION: ICD-10-CM

## 2022-06-01 RX ORDER — LISINOPRIL 20 MG/1
TABLET ORAL
Qty: 90 TABLET | Refills: 1 | Status: SHIPPED | OUTPATIENT
Start: 2022-06-01 | End: 2022-10-06 | Stop reason: SDUPTHER

## 2022-06-01 NOTE — TELEPHONE ENCOUNTER
----- Message from [address] sent at 6/1/2022  3:16 PM CDT -----  Subject: Refill Request    QUESTIONS  Name of Medication? lisinopril (PRINIVIL;ZESTRIL) 20 MG tablet  Patient-reported dosage and instructions? 20 mg  How many days do you have left? 6  Preferred Pharmacy? [facility] phone number (if available)? [phone]  Additional Information for Provider? pt thought he was taken 10 mg 2X a   day. Turns out he was taken 20 mg 2X day He is out of meds now and cannot   get them refilled due to it being to soon. What can be done to get him his   medication  ---------------------------------------------------------------------------  --------------  CALL BACK INFO  What is the best way for the office to contact you? OK to leave message on   voicemail  Preferred Call Back Phone Number? [phone]  ---------------------------------------------------------------------------  --------------  SCRIPT ANSWERS  Relationship to Patient?  Self

## 2022-06-01 NOTE — TELEPHONE ENCOUNTER
Called pt to clarify lisinopril dosage, states he was previously taking 10mg BID but his last script was increased to 20mg QD and he didn't realize the dosage had increased. Has been taking 20mg BID. States he has not checked BP but has been jittery and had a cough. Advised to decrease dosing back to 20mg once daily. Script sent to pharm as requested. Pt unsure if he wants to continue following with Elena Paz since changing offices due to the distance, pt given my direct line in case he wants to make appt or needs any further refills until he can get in with someone else.

## 2022-06-02 RX ORDER — HYDROXYZINE HYDROCHLORIDE 10 MG/1
TABLET, FILM COATED ORAL
Qty: 60 TABLET | Refills: 1 | Status: SHIPPED | OUTPATIENT
Start: 2022-06-02 | End: 2022-10-06 | Stop reason: SDUPTHER

## 2022-07-06 DIAGNOSIS — L50.9 HIVES: ICD-10-CM

## 2022-07-06 DIAGNOSIS — M54.16 LUMBAR RADICULAR PAIN: ICD-10-CM

## 2022-07-07 RX ORDER — GABAPENTIN 600 MG/1
TABLET ORAL
Qty: 180 TABLET | Refills: 0 | Status: SHIPPED | OUTPATIENT
Start: 2022-07-07 | End: 2022-10-06 | Stop reason: SDUPTHER

## 2022-07-07 RX ORDER — MONTELUKAST SODIUM 10 MG/1
TABLET ORAL
Qty: 90 TABLET | Refills: 3 | Status: SHIPPED | OUTPATIENT
Start: 2022-07-07 | End: 2022-10-06 | Stop reason: SDUPTHER

## 2022-08-11 RX ORDER — TIZANIDINE 4 MG/1
TABLET ORAL
Qty: 60 TABLET | Refills: 1 | OUTPATIENT
Start: 2022-08-11

## 2022-08-11 RX ORDER — CYCLOBENZAPRINE HCL 10 MG
TABLET ORAL
Qty: 30 TABLET | Refills: 0 | OUTPATIENT
Start: 2022-08-11

## 2022-09-07 DIAGNOSIS — M19.90 ARTHRITIS: ICD-10-CM

## 2022-09-08 RX ORDER — MELOXICAM 15 MG/1
TABLET ORAL
Qty: 90 TABLET | Refills: 3 | Status: SHIPPED | OUTPATIENT
Start: 2022-09-08

## 2022-09-29 RX ORDER — TIZANIDINE 4 MG/1
TABLET ORAL
Qty: 60 TABLET | Refills: 0 | OUTPATIENT
Start: 2022-09-29

## 2022-09-29 RX ORDER — CYCLOBENZAPRINE HCL 10 MG
TABLET ORAL
Qty: 30 TABLET | Refills: 0 | OUTPATIENT
Start: 2022-09-29

## 2022-09-29 RX ORDER — HYDROXYZINE HYDROCHLORIDE 10 MG/1
TABLET, FILM COATED ORAL
Qty: 60 TABLET | Refills: 1 | OUTPATIENT
Start: 2022-09-29

## 2022-10-06 DIAGNOSIS — I10 ESSENTIAL HYPERTENSION: ICD-10-CM

## 2022-10-06 DIAGNOSIS — M54.16 LUMBAR RADICULAR PAIN: ICD-10-CM

## 2022-10-06 DIAGNOSIS — L50.9 HIVES: ICD-10-CM

## 2022-10-06 RX ORDER — HYDROXYZINE HYDROCHLORIDE 10 MG/1
TABLET, FILM COATED ORAL
Qty: 60 TABLET | Refills: 1 | Status: SHIPPED | OUTPATIENT
Start: 2022-10-06

## 2022-10-06 RX ORDER — TIZANIDINE 4 MG/1
TABLET ORAL
Qty: 60 TABLET | Refills: 1 | Status: SHIPPED | OUTPATIENT
Start: 2022-10-06

## 2022-10-06 RX ORDER — MONTELUKAST SODIUM 10 MG/1
TABLET ORAL
Qty: 90 TABLET | Refills: 3 | Status: SHIPPED | OUTPATIENT
Start: 2022-10-06

## 2022-10-06 RX ORDER — LISINOPRIL 20 MG/1
TABLET ORAL
Qty: 90 TABLET | Refills: 1 | Status: SHIPPED | OUTPATIENT
Start: 2022-10-06

## 2022-10-06 RX ORDER — GABAPENTIN 600 MG/1
TABLET ORAL
Qty: 180 TABLET | Refills: 0 | Status: SHIPPED | OUTPATIENT
Start: 2022-10-06 | End: 2022-11-06

## 2022-10-06 NOTE — TELEPHONE ENCOUNTER
Yovani called requesting a refill of the below medication which has been pended for you:     Requested Prescriptions     Pending Prescriptions Disp Refills    tiZANidine (ZANAFLEX) 4 MG tablet 60 tablet 1    montelukast (SINGULAIR) 10 MG tablet 90 tablet 3     Sig: TAKE 1 TABLET BY MOUTH ONE TIME A DAY    lisinopril (PRINIVIL;ZESTRIL) 20 MG tablet 90 tablet 1     Sig: TAKE ONE TABLET ONE TIME DAILY    hydrOXYzine HCl (ATARAX) 10 MG tablet 60 tablet 1     Sig: TAKE ONE TABLET BY MOUTH TWO TIMES A DAY AS NEEDED FOR ITCHING OR ANXIETY    gabapentin (NEURONTIN) 600 MG tablet 180 tablet 0     Sig: TAKE 1 TABLET BY MOUTH 2 TIMES A DAY       Last Appointment Date: 3/29/2022  Next Appointment Date: 10/13/2022    Allergies   Allergen Reactions    Iodine Rash     Skin peels, feet and hands turn red

## 2022-10-07 RX ORDER — HYDROXYZINE HYDROCHLORIDE 10 MG/1
TABLET, FILM COATED ORAL
Qty: 60 TABLET | Refills: 1 | OUTPATIENT
Start: 2022-10-07

## 2022-10-07 RX ORDER — GABAPENTIN 600 MG/1
TABLET ORAL
Qty: 180 TABLET | Refills: 0 | OUTPATIENT
Start: 2022-10-07 | End: 2023-01-05

## 2022-10-07 RX ORDER — TIZANIDINE 4 MG/1
TABLET ORAL
Qty: 60 TABLET | Refills: 0 | OUTPATIENT
Start: 2022-10-07

## 2022-10-07 RX ORDER — LISINOPRIL 20 MG/1
TABLET ORAL
Qty: 90 TABLET | Refills: 1 | OUTPATIENT
Start: 2022-10-07

## 2022-10-13 ENCOUNTER — OFFICE VISIT (OUTPATIENT)
Dept: FAMILY MEDICINE CLINIC | Age: 62
End: 2022-10-13
Payer: COMMERCIAL

## 2022-10-13 VITALS
DIASTOLIC BLOOD PRESSURE: 76 MMHG | SYSTOLIC BLOOD PRESSURE: 120 MMHG | HEIGHT: 69 IN | HEART RATE: 78 BPM | BODY MASS INDEX: 32.14 KG/M2 | WEIGHT: 217 LBS | TEMPERATURE: 98.2 F | OXYGEN SATURATION: 99 %

## 2022-10-13 DIAGNOSIS — K22.719 BARRETT'S ESOPHAGUS WITH DYSPLASIA: Primary | ICD-10-CM

## 2022-10-13 DIAGNOSIS — M54.16 LUMBAR RADICULAR PAIN: ICD-10-CM

## 2022-10-13 DIAGNOSIS — K22.719 BARRETT'S ESOPHAGUS WITH DYSPLASIA: ICD-10-CM

## 2022-10-13 DIAGNOSIS — I10 ESSENTIAL HYPERTENSION: ICD-10-CM

## 2022-10-13 DIAGNOSIS — R49.0 HOARSENESS: ICD-10-CM

## 2022-10-13 LAB
ANION GAP SERPL CALCULATED.3IONS-SCNC: 11 MMOL/L (ref 7–19)
BASOPHILS ABSOLUTE: 0.1 K/UL (ref 0–0.2)
BASOPHILS RELATIVE PERCENT: 1.2 % (ref 0–1)
BUN BLDV-MCNC: 22 MG/DL (ref 8–23)
CALCIUM SERPL-MCNC: 9.1 MG/DL (ref 8.8–10.2)
CHLORIDE BLD-SCNC: 106 MMOL/L (ref 98–111)
CO2: 26 MMOL/L (ref 22–29)
CREAT SERPL-MCNC: 0.9 MG/DL (ref 0.5–1.2)
EOSINOPHILS ABSOLUTE: 0.3 K/UL (ref 0–0.6)
EOSINOPHILS RELATIVE PERCENT: 4 % (ref 0–5)
GFR AFRICAN AMERICAN: >59
GFR NON-AFRICAN AMERICAN: >60
GLUCOSE BLD-MCNC: 68 MG/DL (ref 74–109)
HCT VFR BLD CALC: 49.1 % (ref 42–52)
HEMOGLOBIN: 16.5 G/DL (ref 14–18)
IMMATURE GRANULOCYTES #: 0 K/UL
LYMPHOCYTES ABSOLUTE: 2.3 K/UL (ref 1.1–4.5)
LYMPHOCYTES RELATIVE PERCENT: 34.6 % (ref 20–40)
MCH RBC QN AUTO: 31.5 PG (ref 27–31)
MCHC RBC AUTO-ENTMCNC: 33.6 G/DL (ref 33–37)
MCV RBC AUTO: 93.7 FL (ref 80–94)
MONOCYTES ABSOLUTE: 0.5 K/UL (ref 0–0.9)
MONOCYTES RELATIVE PERCENT: 7.8 % (ref 0–10)
NEUTROPHILS ABSOLUTE: 3.5 K/UL (ref 1.5–7.5)
NEUTROPHILS RELATIVE PERCENT: 52.1 % (ref 50–65)
PDW BLD-RTO: 12.4 % (ref 11.5–14.5)
PLATELET # BLD: 211 K/UL (ref 130–400)
PMV BLD AUTO: 10.2 FL (ref 9.4–12.4)
POTASSIUM SERPL-SCNC: 3.7 MMOL/L (ref 3.5–5)
RBC # BLD: 5.24 M/UL (ref 4.7–6.1)
SODIUM BLD-SCNC: 143 MMOL/L (ref 136–145)
TSH REFLEX FT4: 1.71 UIU/ML (ref 0.35–5.5)
WBC # BLD: 6.7 K/UL (ref 4.8–10.8)

## 2022-10-13 PROCEDURE — 99214 OFFICE O/P EST MOD 30 MIN: CPT | Performed by: NURSE PRACTITIONER

## 2022-10-13 RX ORDER — CYCLOBENZAPRINE HCL 10 MG
10 TABLET ORAL 3 TIMES DAILY PRN
COMMUNITY
End: 2022-10-13 | Stop reason: SDUPTHER

## 2022-10-13 RX ORDER — CYCLOBENZAPRINE HCL 10 MG
10 TABLET ORAL 3 TIMES DAILY PRN
Qty: 30 TABLET | Refills: 2 | Status: SHIPPED | OUTPATIENT
Start: 2022-10-13

## 2022-10-13 RX ORDER — GABAPENTIN 100 MG/1
100 CAPSULE ORAL DAILY
Qty: 30 CAPSULE | Refills: 3 | Status: SHIPPED | OUTPATIENT
Start: 2022-10-13 | End: 2022-11-12

## 2022-10-13 NOTE — PROGRESS NOTES
[facility] PHYSICIAN SERVICES  [facility] [place]  [address] [facility]  Dept: [phone]  Dept Fax: [phone]  Loc: [phone]    Kimberly Lyn is a 65 y.o. male who presents today for his medical conditions/complaints as noted below. Kimberly Lyn is c/o of Follow-up (Chronic conditions & med refills)      Chief Complaint   Patient presents with    Follow-up     Chronic conditions & med refills       HPI:     HPI  Patient presents today for follow up of chronic conditions. He does need medication refills while here today. He is requesting EGD as he has had some issues with swallowing. He has had a history of esophagus issues in the past.    He has checked his BP at home and it is stable. Past Medical History:   Diagnosis Date    Backache, unspecified     Artis's esophagus     Bleeds easily (HCC)     Diseases of lips     Esophageal reflux     Glossitis     Hematospermia     Hoarseness     Insomnia, unspecified     Mononeuritis of unspecified site     Neck pain     Neuropathy     nate feet    Other abnormal clinical finding     Other chronic pain     Other malaise and fatigue     Other specified disorders of breast     Sore throat     Unspecified essential hypertension     Urinary tract infection, site not specified         Past Surgical History:   Procedure Laterality Date    Andrae Cheeks      COLONOSCOPY      Dr Linnea Peterson @ [facility]-normal per patient    COLONOSCOPY  03/07/2014    Dr Jazmin Lira:  HP, 5y recall    COLONOSCOPY N/A 03/22/2021    Dr Catherine Saenz yr recall    Haresh Monas      Dr Linnea Peterson @ [facility]-Artis's     UPPER GASTROINTESTINAL ENDOSCOPY  02/19/2014    Dr Jazmin Lira:  Biopsy for Artis's surveillance inadequate for evaluation.  3 year repeat    UPPER GASTROINTESTINAL ENDOSCOPY N/A 08/06/2019    Dr Hussein Flores over wire-55 F-Gastropathy, gastric polyps, esophagitis-No recall    UPPER GASTROINTESTINAL ENDOSCOPY  08/06/2019     Review of Systems   Constitutional: Negative. HENT:  Positive for trouble swallowing. Eyes: Negative. Respiratory: Negative. Cardiovascular: Negative. Gastrointestinal: Negative. Endocrine: Negative. Genitourinary: Negative. Musculoskeletal: Negative. Skin: Negative. Neurological: Negative. Hematological: Negative. Psychiatric/Behavioral: Negative. Objective:     Physical Exam  Vitals and nursing note reviewed. Constitutional:       Appearance: Normal appearance. HENT:      Head: Normocephalic and atraumatic. Right Ear: Hearing, tympanic membrane, ear canal and external ear normal.      Left Ear: Hearing, tympanic membrane, ear canal and external ear normal.      Nose: Nose normal.      Mouth/Throat:      Lips: Pink. Mouth: Mucous membranes are moist.      Pharynx: Oropharynx is clear. Eyes:      General: Lids are normal.      Extraocular Movements: Extraocular movements intact. Conjunctiva/sclera: Conjunctivae normal.      Pupils: Pupils are equal, round, and reactive to light. Neck:      Thyroid: No thyromegaly. Cardiovascular:      Rate and Rhythm: Normal rate and regular rhythm. Pulses: Normal pulses. Dorsalis pedis pulses are 2+ on the right side and 2+ on the left side. Posterior tibial pulses are 2+ on the right side and 2+ on the left side. Heart sounds: Normal heart sounds. Pulmonary:      Effort: Pulmonary effort is normal.      Breath sounds: Normal breath sounds and air entry. Abdominal:      General: Bowel sounds are normal.      Palpations: Abdomen is soft. Musculoskeletal:      Cervical back: Full passive range of motion without pain, normal range of motion and neck supple. Thoracic back: No tenderness. Normal range of motion. Lumbar back: No tenderness. Normal range of motion. Lymphadenopathy:      Cervical: No cervical adenopathy. Skin:     General: Skin is warm and dry. Capillary Refill: Capillary refill takes less than 2 seconds. Neurological:      General: No focal deficit present. Mental Status: He is alert and oriented to person, place, and time. Mental status is at baseline. Coordination: Coordination is intact. Psychiatric:         Mood and Affect: Mood normal.         Speech: Speech normal.         Behavior: Behavior normal.         Thought Content: Thought content normal.         Cognition and Memory: Cognition and memory normal.         Judgment: Judgment normal.       /76   Pulse 78   Temp 98.2 °F (36.8 °C)   Ht 5' 9\" (1.753 m)   Wt 217 lb (98.4 kg)   SpO2 99%   BMI 32.05 kg/m²     Assessment:      Diagnosis Orders   1. Artis's esophagus with dysplasia  CBC with Auto Differential    Basic Metabolic Panel    TSH with Reflex to [address] [place] [address], MARTIN Small, Gastroenterology, [place]      2. Lumbar radicular pain  cyclobenzaprine (FLEXERIL) 10 MG tablet    gabapentin (NEURONTIN) 100 MG capsule      3. MARTIN Barragan, Gastroenterology, [place]      4. Essential hypertension            No results found for this visit on 10/13/22. Plan:     1. Lumbar radicular pain  Refill on meds. - cyclobenzaprine (FLEXERIL) 10 MG tablet; Take 1 tablet by mouth 3 times daily as needed for Muscle spasms  Dispense: 30 tablet; Refill: 2  - gabapentin (NEURONTIN) 100 MG capsule; Take 1 capsule by mouth daily for 30 days. Dispense: 30 capsule; Refill: 3    2. Artis's esophagus with dysplasia  Referral to GI due to history of Artis's and worsening symptoms.    - CBC with Auto Differential; Future  - Basic Metabolic Panel; Future  - TSH with Reflex to FT4; Future  - [place] - MARTIN Mcginnis, Gastroenterology, [place]    3. Hoarseness    - MARTIN Iverson, Gastroenterology, [place]    4. Essential hypertension  Stable. No follow-ups on file.     Orders Placed This Encounter   Procedures    CBC with Auto Differential     Standing Status:   Future     Number of Occurrences:   1     Standing Expiration Date:   25/11/2914    Basic Metabolic Panel     Standing Status:   Future     Number of Occurrences:   1     Standing Expiration Date:   10/13/2023    TSH with Reflex to FT4     Standing Status:   Future     Number of Occurrences:   1     Standing Expiration Date:   10/13/2023    MARTIN Reaves, Gastroenterology, [place]     Referral Priority:   Routine     Referral Type:   Eval and Treat     Referral Reason:   Specialty Services Required     Referred to Provider:   MARTIN Lim - NP     Requested Specialty:   Family Nurse Practitioner     Number of Visits Requested:   1         Orders Placed This Encounter   Medications    cyclobenzaprine (FLEXERIL) 10 MG tablet     Sig: Take 1 tablet by mouth 3 times daily as needed for Muscle spasms     Dispense:  30 tablet     Refill:  2    gabapentin (NEURONTIN) 100 MG capsule     Sig: Take 1 capsule by mouth daily for 30 days. Dispense:  30 capsule     Refill:  3              Patient offered educational handouts and has had all questions answered. Patient voices understanding and agrees to plans along with risks and benefits of plan. Patient is instructed to continue prior meds, diet, and exercise plans as instructed. Patient agrees to follow up as instructed and sooner if needed. Patient agrees to go to ER if condition becomes emergent. EMR Dragon/transcription disclaimer: Some of this encounter note is an electronic transcription/translation of spoken language to printed text. The electronic translation of spoken language may permit erroneous, or at times, nonsensical words or phrases to be inadvertently transcribed.  Although I have reviewed the note for such errors, some may still exist.    Electronically signed by MARTIN Loco on 10/17/2022 at 10:22 AM

## 2022-10-14 ENCOUNTER — TELEPHONE (OUTPATIENT)
Dept: GASTROENTEROLOGY | Age: 62
End: 2022-10-14

## 2022-10-14 NOTE — TELEPHONE ENCOUNTER
Yovani requests that someone  return their call. Would like to know if egd will be scheduled before end of year with ov being mid Nov. Pt wanting to have done before year is out due to insurance. The best time to reach him is Early morning (before 9am). Thank you.

## 2022-10-17 ASSESSMENT — ENCOUNTER SYMPTOMS
GASTROINTESTINAL NEGATIVE: 1
EYES NEGATIVE: 1
RESPIRATORY NEGATIVE: 1
TROUBLE SWALLOWING: 1

## 2022-11-14 ENCOUNTER — OFFICE VISIT (OUTPATIENT)
Dept: GASTROENTEROLOGY | Age: 62
End: 2022-11-14
Payer: COMMERCIAL

## 2022-11-14 VITALS
BODY MASS INDEX: 32.44 KG/M2 | HEART RATE: 82 BPM | SYSTOLIC BLOOD PRESSURE: 138 MMHG | DIASTOLIC BLOOD PRESSURE: 80 MMHG | OXYGEN SATURATION: 96 % | HEIGHT: 69 IN | WEIGHT: 219 LBS

## 2022-11-14 DIAGNOSIS — R13.10 DYSPHAGIA, UNSPECIFIED TYPE: ICD-10-CM

## 2022-11-14 DIAGNOSIS — R49.0 HOARSENESS: ICD-10-CM

## 2022-11-14 DIAGNOSIS — K21.9 CHRONIC GERD: Primary | ICD-10-CM

## 2022-11-14 PROCEDURE — 99214 OFFICE O/P EST MOD 30 MIN: CPT | Performed by: NURSE PRACTITIONER

## 2022-11-14 ASSESSMENT — ENCOUNTER SYMPTOMS
CHOKING: 0
SHORTNESS OF BREATH: 0
VOICE CHANGE: 1
ABDOMINAL DISTENTION: 0
DIARRHEA: 0
BLOOD IN STOOL: 0
RECTAL PAIN: 0
SORE THROAT: 1
NAUSEA: 0
COUGH: 0
ABDOMINAL PAIN: 0
CONSTIPATION: 0
TROUBLE SWALLOWING: 1
ANAL BLEEDING: 0
VOMITING: 0

## 2022-11-14 NOTE — PROGRESS NOTES
Subjective:     Patient ID: Heather Jensen is a 65 y.o. male  PCP: MARTIN Rae  Referring Provider: MARTIN Philippe    [facility]  Patient presents to the office today with the following complaints: Gastroesophageal Reflux      Pt seen today for c/o issues swallowing. This began a few months ago. Does not occur with every meal.  He reports issues with foods and liquids getting stuck. He is currently taking Omeprazole 40 mg BID. He continues to have breakthrough reflux at times. He c/o sore throat all the time and hoarse voice. He has been on Nexium in the past.  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 Last EGD 2019 - w/dilation over wire-54 F-Gastropathy, gastric polyps, esophagitis-No recall  Last Colonoscopy 2021 - 5 year recall    Assessment:     1. Chronic GERD    2. Dysphagia, unspecified type    3. Hoarseness            Plan:   - Schedule EGD  Nothing to eat or drink after midnight. No driving for 24 hours after procedure. Bring a  to procedure. No aspirin, NSAIDs, fish oil 5 days before procedure. I have discussed the benefits, alternatives, and risks (including bleeding, perforation and death)  for pursuing Endoscopy (EGD/Colonscopy/EUS/ERCP) with the patient and they are willing to continue. We also discussed the need for anesthesia, IV access, proper dietary changes, medication changes if necessary, and need for bowel prep (if ordered) prior to their Endoscopic procedure. They are aware they must have someone accompany them to their scheduled procedure to drive them home - they agree to the above and are willing to continue. Orders  No orders of the defined types were placed in this encounter. Medications  No orders of the defined types were placed in this encounter.         Patient History:     Past Medical History:   Diagnosis Date    Backache, unspecified     Artis's esophagus     Bleeds easily ([address]     Diseases of lips     Esophageal reflux     Glossitis Hematospermia     Hoarseness     Insomnia, unspecified     Mononeuritis of unspecified site     Neck pain     Neuropathy     nate feet    Other abnormal clinical finding     Other chronic pain     Other malaise and fatigue     Other specified disorders of breast     Sore throat     Unspecified essential hypertension     Urinary tract infection, site not specified        Past Surgical History:   Procedure Laterality Date    Teresita Galeanam      COLONOSCOPY      Dr Lizette Devries @ [facility]-normal per patient    COLONOSCOPY  03/07/2014    Dr Nicolasa Torres:  HP, 5y recall    COLONOSCOPY N/A 03/22/2021    Dr Peter Prim yr recall    Pat Blotter      Dr Lizette Devries @ [facility]-Artis's     UPPER GASTROINTESTINAL ENDOSCOPY  02/19/2014    Dr Nicolasa Torres:  Biopsy for Artis's surveillance inadequate for evaluation.  3 year repeat    UPPER GASTROINTESTINAL ENDOSCOPY N/A 08/06/2019    Dr Haylee Lim over wire-54 F-Gastropathy, gastric polyps, esophagitis-No recall    UPPER GASTROINTESTINAL ENDOSCOPY  08/06/2019    Dr Haylee Lim over wire-55 F-Gastropathy, gastric polyps, esophagitis-No recall       Family History   Problem Relation Age of Onset    Diabetes Mother     Diabetes Father     Heart Disease Father     Colon Polyps Maternal Grandmother     Crohn's Disease Daughter     Crohn's Disease Other     Colon Cancer Neg Hx     Liver Cancer Neg Hx     Stomach Cancer Neg Hx     Rectal Cancer Neg Hx     Esophageal Cancer Neg Hx     Liver Disease Neg Hx        Social History     Socioeconomic History    Marital status:    Tobacco Use    Smoking status: Never    Smokeless tobacco: Never   Vaping Use    Vaping Use: Never used   Substance and Sexual Activity    Alcohol use: No    Drug use: No       Current Outpatient Medications   Medication Sig Dispense Refill    cyclobenzaprine (FLEXERIL) 10 MG tablet Take 1 tablet by mouth 3 times daily as needed for Muscle spasms 30 tablet 2    gabapentin (NEURONTIN) 100 MG capsule Take 1 capsule by mouth daily for 30 days. 30 capsule 3    tiZANidine (ZANAFLEX) 4 MG tablet TAKE ONE TABLET BY MOUTH EVERY 6 HOURS AS NEEDED FOR MUSCLE SPASMS 60 tablet 1    montelukast (SINGULAIR) 10 MG tablet TAKE 1 TABLET BY MOUTH ONE TIME A DAY 90 tablet 3    lisinopril (PRINIVIL;ZESTRIL) 20 MG tablet TAKE ONE TABLET ONE TIME DAILY 90 tablet 1    hydrOXYzine HCl (ATARAX) 10 MG tablet TAKE ONE TABLET BY MOUTH TWO TIMES A DAY AS NEEDED FOR ITCHING OR ANXIETY 60 tablet 1    gabapentin (NEURONTIN) 600 MG tablet TAKE 1 TABLET BY MOUTH 2 TIMES A  tablet 0    meloxicam (MOBIC) 15 MG tablet TAKE ONE TABLET BY MOUTH ONE TIME DAILY 90 tablet 3    omeprazole (PRILOSEC) 40 MG delayed release capsule TAKE ONE CAPSULE TWO TIMES A  capsule 3    Multiple Vitamins-Minerals (MULTIVITAMIN ADULT PO) Take by mouth daily      Fexofenadine HCl (ALLEGRA PO) Take by mouth daily        No current facility-administered medications for this visit. Allergies   Allergen Reactions    Iodine Rash     Skin peels, feet and hands turn red       Review of Systems   Constitutional:  Negative for activity change, appetite change, fatigue, fever and unexpected weight change. HENT:  Positive for sore throat, trouble swallowing and voice change. Respiratory:  Negative for cough, choking and shortness of breath. Cardiovascular:  Positive for chest pain. Gastrointestinal:  Negative for abdominal distention, abdominal pain, anal bleeding, blood in stool, constipation, diarrhea, nausea, rectal pain and vomiting. Reflux    Allergic/Immunologic: Negative for food allergies. All other systems reviewed and are negative. Objective:     /80 (Site: Left Upper Arm)   Pulse 82   Ht 5' 9\" (1.753 m)   Wt 219 lb (99.3 kg)   SpO2 96%   BMI 32.34 kg/m²     Physical Exam  Vitals reviewed. Constitutional:       General: He is not in acute distress. Appearance: He is well-developed. HENT:      Head: Normocephalic and atraumatic. Right Ear: External ear normal.      Left Ear: External ear normal.      Nose: Nose normal.   Eyes:      Conjunctiva/sclera: Conjunctivae normal.      Pupils: Pupils are equal, round, and reactive to light. Cardiovascular:      Rate and Rhythm: Normal rate and regular rhythm. Pulmonary:      Effort: Pulmonary effort is normal. No respiratory distress. Breath sounds: Normal breath sounds. Abdominal:      General: There is no distension. Musculoskeletal:         General: Normal range of motion. Cervical back: Normal range of motion and neck supple. Skin:     General: Skin is warm and dry. Findings: No rash. Nails: There is no clubbing. Neurological:      Mental Status: He is alert and oriented to person, place, and time. Gait: Gait normal.   Psychiatric:         Behavior: Behavior normal.         Thought Content:  Thought content normal.

## 2022-11-28 ENCOUNTER — TELEPHONE (OUTPATIENT)
Dept: GASTROENTEROLOGY | Age: 62
End: 2022-11-28

## 2022-11-28 NOTE — TELEPHONE ENCOUNTER
Called patient to remind them of their procedure  at [facility] on 12/1/22 to arrive at [address]

## 2022-11-30 DIAGNOSIS — Z76.0 MEDICATION REFILL: ICD-10-CM

## 2022-11-30 RX ORDER — OMEPRAZOLE 40 MG/1
CAPSULE, DELAYED RELEASE ORAL
Qty: 180 CAPSULE | Refills: 3 | Status: SHIPPED | OUTPATIENT
Start: 2022-11-30

## 2022-11-30 RX ORDER — HYDROXYZINE HYDROCHLORIDE 10 MG/1
TABLET, FILM COATED ORAL
Qty: 60 TABLET | Refills: 1 | Status: SHIPPED | OUTPATIENT
Start: 2022-11-30

## 2022-11-30 NOTE — TELEPHONE ENCOUNTER
Willard Clay called requesting a refill of the below medication which has been pended for you:     Requested Prescriptions     Pending Prescriptions Disp Refills    omeprazole (PRILOSEC) 40 MG delayed release capsule 180 capsule 3     Sig: TAKE ONE CAPSULE TWO TIMES A DAY    hydrOXYzine HCl (ATARAX) 10 MG tablet 60 tablet 1     Sig: TAKE ONE TABLET BY MOUTH TWO TIMES A DAY AS NEEDED FOR ITCHING OR ANXIETY       Last Appointment Date: 10/13/2022  Next Appointment Date: Visit date not found    Allergies   Allergen Reactions    Iodine Rash     Skin peels, feet and hands turn red

## 2022-12-01 ENCOUNTER — HOSPITAL ENCOUNTER (OUTPATIENT)
Age: 62
Setting detail: OUTPATIENT SURGERY
Discharge: HOME | End: 2022-12-01
Attending: INTERNAL MEDICINE | Admitting: INTERNAL MEDICINE

## 2022-12-01 ENCOUNTER — ANESTHESIA (OUTPATIENT)
Dept: OPERATING ROOM | Age: 62
End: 2022-12-01

## 2022-12-01 ENCOUNTER — HOSPITAL ENCOUNTER (OUTPATIENT)
Age: 62
Setting detail: SPECIMEN
Discharge: HOME | End: 2022-12-01
Payer: COMMERCIAL

## 2022-12-01 ENCOUNTER — ANESTHESIA EVENT (OUTPATIENT)
Dept: OPERATING ROOM | Age: 62
End: 2022-12-01

## 2022-12-01 ENCOUNTER — APPOINTMENT (OUTPATIENT)
Dept: OPERATING ROOM | Age: 62
End: 2022-12-01

## 2022-12-01 VITALS
WEIGHT: 215 LBS | RESPIRATION RATE: 18 BRPM | TEMPERATURE: 98.7 F | OXYGEN SATURATION: 96 % | SYSTOLIC BLOOD PRESSURE: 123 MMHG | DIASTOLIC BLOOD PRESSURE: 76 MMHG | HEIGHT: 69 IN | HEART RATE: 67 BPM | BODY MASS INDEX: 31.84 KG/M2

## 2022-12-01 PROCEDURE — 43239 EGD BIOPSY SINGLE/MULTIPLE: CPT

## 2022-12-01 PROCEDURE — 88305 TISSUE EXAM BY PATHOLOGIST: CPT

## 2022-12-01 PROCEDURE — 43248 EGD GUIDE WIRE INSERTION: CPT

## 2022-12-01 PROCEDURE — 43450 DILATE ESOPHAGUS 1/MULT PASS: CPT

## 2022-12-01 RX ORDER — PROPOFOL 10 MG/ML
INJECTION, EMULSION INTRAVENOUS PRN
Status: DISCONTINUED | OUTPATIENT
Start: 2022-12-01 | End: 2022-12-01 | Stop reason: SDUPTHER

## 2022-12-01 RX ORDER — SODIUM CHLORIDE 9 MG/ML
INJECTION, SOLUTION INTRAVENOUS CONTINUOUS
Status: DISCONTINUED | OUTPATIENT
Start: 2022-12-01 | End: 2022-12-01 | Stop reason: HOSPADM

## 2022-12-01 RX ORDER — LIDOCAINE HYDROCHLORIDE 10 MG/ML
INJECTION, SOLUTION INFILTRATION; PERINEURAL PRN
Status: DISCONTINUED | OUTPATIENT
Start: 2022-12-01 | End: 2022-12-01 | Stop reason: SDUPTHER

## 2022-12-01 RX ADMIN — SODIUM CHLORIDE: 9 INJECTION, SOLUTION INTRAVENOUS at 09:30

## 2022-12-01 RX ADMIN — PROPOFOL 200 MG: 10 INJECTION, EMULSION INTRAVENOUS at 10:16

## 2022-12-01 RX ADMIN — LIDOCAINE HYDROCHLORIDE 40 MG: 10 INJECTION, SOLUTION INFILTRATION; PERINEURAL at 10:16

## 2022-12-01 NOTE — ANESTHESIA POSTPROCEDURE EVALUATION
Department of Anesthesiology  Postprocedure Note    Patient: Eduardo Tao  MRN: [id number]  YOB: 1960  Date of evaluation: 12/1/2022      Procedure Summary     Date: 12/01/22 Room / Location: [facility] ENDO 01 / [address]    Anesthesia Start: 1008 Anesthesia Stop:     Procedures:       EGD BIOPSY (Esophagus)      EGD DILATION SAVORY 54 [place] (Esophagus) Diagnosis:       Dysphagia, unspecified type      Chronic GERD      (Dysphagia, unspecified type [R13.10])      (Chronic GERD [K21.9])    Surgeons: Ash Go MD Responsible Provider: MARTIN Garcia CRNA    Anesthesia Type: general, TIVA ASA Status: 2          Anesthesia Type: No value filed.     Lis Phase I:      Lis Phase II:        Anesthesia Post Evaluation    Patient location during evaluation: bedside  Patient participation: complete - patient participated  Level of consciousness: sleepy but conscious  Pain score: 0  Airway patency: patent  Nausea & Vomiting: no nausea and no vomiting  Complications: no  Cardiovascular status: hemodynamically stable and blood pressure returned to baseline  Respiratory status: acceptable and nasal cannula  Hydration status: stable

## 2022-12-01 NOTE — DISCHARGE INSTRUCTIONS
RECOMMENDATIONS:    1. Await path results  2. Continue GI meds as prescribed  3. Repeat dilation as needed    POST-OP ORDERS: ENDOSCOPY & COLONOSCOPY:    1. Rest today. 2. DO NOT eat or drink until wide awake; eat your usual diet today in moderate amount only. 3. DO NOT drive today. 4. Call physician if you have severe pain, vomiting, fever, rectal bleeding or black bowel movements. 5.  If a biopsy was taken or a polyp removed, you should expect to hear results in about 21 days. If you have heard nothing from your physician by then, call the office for results. 6.  Discharge home when patient awake, vitals signs stable and tolerating liquids.

## 2022-12-01 NOTE — OP NOTE
Endoscopic Procedure Note    Patient: Jordis Soulier : 1960  Med Rec#: [id number] Acc#: [id number]     Primary Care Provider MARTIN Rodriguez  Referring Provider: MARTIN Babb    Endoscopist: Luis Arevalo MD    Date of Procedure:  2022    Procedure:   1. EGD with biopsy  2. EGD with wire-guided dilation- 47 F    Indications:   1. Dysphagia   2. Reflux   3. Hx of Artis's esophagus    Anesthesia:  Sedation was administered by anesthesia who monitored the patient during the procedure. Estimated Blood Loss: minimal    Procedure:   After reviewing the patient's chart and obtaining informed consent, the patient was placed in the left lateral decubitus position. A forward-viewing Olympus endoscope was lubricated and inserted through the mouth into the oropharynx. Under direct visualization, the upper esophagus was intubated. The scope was advanced to the level of the third portion of duodenum. Scope was slowly withdrawn with careful inspection of the mucosal surfaces. The scope was retroflexed for inspection of the gastric fundus and incisura. Findings and maneuvers are listed in impression below. The patient tolerated the procedure well. The scope was removed. There were no immediate complications. Findings:   Esophagus: abnormal: there are mucosal changes at the GE junction, not definitive of Artis's- biopsied. There is no hiatal hernia present. There is a questionable, benign-appearing stricture in the distal esophagus, dilated due to symptoms. A guidewire was placed through the endoscope and the endoscope was removed. A 54 Kenyan savory dilator was advanced down the length of the esophagus using a fixed-point wire technique. The dilator and guidewire were removed. The patient tolerated the procedure well. Stomach:  normal      Duodenum: normal    IMPRESSION:  1. Esophageal biopsies obtained as above  2. S/p wire-guided dilation- 47 F     RECOMMENDATIONS:    1. Await path results  2. Continue GI meds as prescribed  3. Repeat dilation as needed    The results were discussed with the patient and family. A copy of the images obtained were given to the patient. Cresencio Rizvi am scribing for and in the presence of Dr. Kelly Lisa MD.  Electronically signed by Reece Bradley RN on 12/1/2022 at 9:17 AM    I personally performed the services described in this documentation as scribed by Davina Laboy, and it appears accurate and complete.      Malka Pleitez MD  12/1/2022

## 2022-12-01 NOTE — ANESTHESIA PRE PROCEDURE
Department of Anesthesiology  Preprocedure Note       Name:  Jai Rainey   Age:  65 y.o.  :  1960                                          MRN:  [id number]         Date:  2022      Surgeon: Clay Leong):  Coby Martinez MD    Procedure: Procedure(s):  EGD BIOPSY    Medications prior to admission:   Prior to Admission medications    Medication Sig Start Date End Date Taking? Authorizing Provider   omeprazole (PRILOSEC) 40 MG delayed release capsule TAKE ONE CAPSULE TWO TIMES A DAY 22   MARTIN Murcia   hydrOXYzine HCl (ATARAX) 10 MG tablet TAKE ONE TABLET BY MOUTH TWO TIMES A DAY AS NEEDED FOR ITCHING OR ANXIETY 22   MARTIN Murcia   cyclobenzaprine (FLEXERIL) 10 MG tablet Take 1 tablet by mouth 3 times daily as needed for Muscle spasms 10/13/22   MARTIN Murcia   gabapentin (NEURONTIN) 100 MG capsule Take 1 capsule by mouth daily for 30 days. 10/13/22 11/14/22  MARTIN Murcia   tiZANidine (ZANAFLEX) 4 MG tablet TAKE ONE TABLET BY MOUTH EVERY 6 HOURS AS NEEDED FOR MUSCLE SPASMS 10/6/22   MARTIN Murcia   montelukast (SINGULAIR) 10 MG tablet TAKE 1 TABLET BY MOUTH ONE TIME A DAY 10/6/22   MARTIN Murcia   lisinopril (PRINIVIL;ZESTRIL) 20 MG tablet TAKE ONE TABLET ONE TIME DAILY 10/6/22   MARTIN Murcia   gabapentin (NEURONTIN) 600 MG tablet TAKE 1 TABLET BY MOUTH 2 TIMES A DAY 10/6/22 11/14/22  MARTIN Murcia   meloxicam (MOBIC) 15 MG tablet TAKE ONE TABLET BY MOUTH ONE TIME DAILY 22   MARTIN Murcia   Multiple Vitamins-Minerals (MULTIVITAMIN ADULT PO) Take by mouth daily    Historical Provider, MD   Fexofenadine HCl (ALLEGRA PO) Take by mouth daily     Historical Provider, MD       Current medications:    No current outpatient medications on file. No current facility-administered medications for this visit. Allergies:     Allergies   Allergen Reactions    Iodine Rash     Skin peels, feet and hands turn red       Problem List:    Patient Active Problem List   Diagnosis Code    GERD (gastroesophageal reflux disease) K21.9    Artis's esophagus K22.70    Nausea R11.0    Internal hemorrhoids K64.8    History of colon polyps Z86.010    Earlobe lesion, right H61.91    Bilateral impacted cerumen H61.23       Past Medical History:        Diagnosis Date    Backache, unspecified     Artis's esophagus     Bleeds easily ([address]     Diseases of lips     Esophageal reflux     Glossitis     Hematospermia     Hoarseness     Insomnia, unspecified     Mononeuritis of unspecified site     Neck pain     Neuropathy     nate feet    Other abnormal clinical finding     Other chronic pain     Other malaise and fatigue     Other specified disorders of breast     Sore throat     Unspecified essential hypertension     Urinary tract infection, site not specified        Past Surgical History:        Procedure Laterality Date    APPENDECTOMY      CHOLECYSTECTOMY      COLONOSCOPY      Dr Jarred Jonas @ [facility]-normal per patient    COLONOSCOPY  03/07/2014    Dr Enrico Zacarias:  HP, 5y recall    COLONOSCOPY N/A 03/22/2021    Dr Cheyenne Naylor yr recall   [facility]      Dr Jarred Jonas @ [facility]-Artis's     UPPER GASTROINTESTINAL ENDOSCOPY  02/19/2014    Dr Enrico Zacarias:  Biopsy for Artis's surveillance inadequate for evaluation. 3 year repeat    UPPER GASTROINTESTINAL ENDOSCOPY N/A 08/06/2019    Dr Yoselin Campos over wire-54 F-Gastropathy, gastric polyps, esophagitis-No recall    UPPER GASTROINTESTINAL ENDOSCOPY  08/06/2019    Dr Yoselin Campos over wire-55 F-Gastropathy, gastric polyps, esophagitis-No recall       Social History:    Social History     Tobacco Use    Smoking status: Never    Smokeless tobacco: Never   Substance Use Topics    Alcohol use: No                                Counseling given: Not Answered      Vital Signs (Current): There were no vitals filed for this visit.                                            BP Readings from Last 3 Encounters:   11/14/22 138/80   10/13/22 120/76   03/30/22 (!) 143/87       NPO Status:                                                                                 BMI:   Wt Readings from Last 3 Encounters:   11/14/22 219 lb (99.3 kg)   10/13/22 217 lb (98.4 kg)   03/29/22 219 lb (99.3 kg)     There is no height or weight on file to calculate BMI.    CBC:   Lab Results   Component Value Date/Time    WBC 6.7 10/13/2022 03:03 PM    RBC 5.24 10/13/2022 03:03 PM    HGB 16.5 10/13/2022 03:03 PM    HCT 49.1 10/13/2022 03:03 PM    MCV 93.7 10/13/2022 03:03 PM    RDW 12.4 10/13/2022 03:03 PM     10/13/2022 03:03 PM       CMP:   Lab Results   Component Value Date/Time     10/13/2022 03:03 PM    K 3.7 10/13/2022 03:03 PM     10/13/2022 03:03 PM    CO2 26 10/13/2022 03:03 PM    BUN 22 10/13/2022 03:03 PM    CREATININE 0.9 10/13/2022 03:03 PM    GFRAA >59 10/13/2022 03:03 PM    LABGLOM >60 10/13/2022 03:03 PM    GLUCOSE 68 10/13/2022 03:03 PM    PROT 7.0 03/15/2021 08:18 AM    CALCIUM 9.1 10/13/2022 03:03 PM    BILITOT 0.6 03/15/2021 08:18 AM    ALKPHOS 114 03/15/2021 08:18 AM    AST 24 03/15/2021 08:18 AM    ALT 23 03/15/2021 08:18 AM       POC Tests: No results for input(s): POCGLU, POCNA, POCK, POCCL, POCBUN, POCHEMO, POCHCT in the last 72 hours.     Coags: No results found for: PROTIME, INR, APTT    HCG (If Applicable): No results found for: PREGTESTUR, PREGSERUM, HCG, HCGQUANT     ABGs: No results found for: PHART, PO2ART, MBZ3LPX, VHW6YHB, BEART, X8QGIDUS     Type & Screen (If Applicable):  No results found for: LABABO, LABRH    Drug/Infectious Status (If Applicable):  No results found for: HIV, HEPCAB    COVID-19 Screening (If Applicable):   Lab Results   Component Value Date/Time    COVID19 NOT DETECTED 03/18/2021 08:05 AM           Anesthesia Evaluation  Patient summary reviewed and Nursing notes reviewed no history of anesthetic complications:   Airway: Mallampati: II  TM distance: >3 FB   Neck ROM: full  Mouth opening: < 3 FB   Dental: normal exam         Pulmonary:normal exam                              ROS comment: hoarseness   Cardiovascular:  Exercise tolerance: no interval change,   (+) hypertension:,          Beta Blocker:  Not on Beta Blocker         Neuro/Psych:                ROS comment: Neuropathy GI/Hepatic/Renal:   (+) GERD: well controlled,          ROS comment: Artis's esophagus. Endo/Other: Negative Endo/Other ROS                    Abdominal:             Vascular: negative vascular ROS. Other Findings:             Anesthesia Plan      general and TIVA     ASA 2       Induction: intravenous. Anesthetic plan and risks discussed with patient.                         MARTIN Giles - CRNA   12/1/2022

## 2022-12-01 NOTE — H&P
Patient Name: Kyle Amin  : 1960  MRN: [id number]  DATE: 22    Allergies: Allergies   Allergen Reactions    Iodine Rash     Skin peels, feet and hands turn red        ENDOSCOPY  History and Physical    Procedure:    [] Diagnostic Colonoscopy       [] Screening Colonoscopy  [x] EGD      [] ERCP      [] EUS       [] Other    [x] Previous office notes/History and Physical reviewed from the patients chart. Please see EMR for further details of HPI. I have examined the patient's status immediately prior to the procedure and:      Indications/HPI:    []Abdominal Pain   [x]Barretts  []Screening/Surveillance   []History of Polyps  [x]Dysphagia            [] +Cologard/DNA testing  []Abnormal Imaging              []EOE Hx              [] Family Hx of CRC/Polyps  []Anemia                            []Food Impaction       []Recent Poor Prep  []GI Bleed             []Lymphadenopathy  []History of Polyps  []Change in bowel habits [x]Heartburn/Reflux  []Cancer- GI/Lung  []Chest Pain - Non Cardiac []Heme (+) Stool []Ulcers  []Constipation  []Hemoptysis  []Incontinence    []Diarrhea  []Hypoxemia  []Rectal Bleed (BRBPR)  []Nausea/Vomiting   [] Varices  []Crohns/Colitis  []Pancreatic Cyst   [] Cirrhosis   []Pancreatitis    []Abnormal MRCP  []Elevated LFT [] Stent Removal, Previous ERCP  []Other:     Anesthesia:   [x] MAC [] Moderate Sedation   [] General   [] None     ROS: 12 pt Review of Symptoms was negative unless mentioned above    Medications:   Prior to Admission medications    Medication Sig Start Date End Date Taking?  Authorizing Provider   omeprazole (PRILOSEC) 40 MG delayed release capsule TAKE ONE CAPSULE TWO TIMES A DAY 22   MARTIN Gomez   hydrOXYzine HCl (ATARAX) 10 MG tablet TAKE ONE TABLET BY MOUTH TWO TIMES A DAY AS NEEDED FOR ITCHING OR ANXIETY 22   MARTIN Gomez   cyclobenzaprine (FLEXERIL) 10 MG tablet Take 1 tablet by mouth 3 times daily as needed for Muscle spasms 10/13/22   MARTIN Mendieta   gabapentin (NEURONTIN) 100 MG capsule Take 1 capsule by mouth daily for 30 days. 10/13/22 11/14/22  MARTIN Mendieta   tiZANidine (ZANAFLEX) 4 MG tablet TAKE ONE TABLET BY MOUTH EVERY 6 HOURS AS NEEDED FOR MUSCLE SPASMS 10/6/22   Vikram Mccallum, APRN   montelukast (SINGULAIR) 10 MG tablet TAKE 1 TABLET BY MOUTH ONE TIME A DAY 10/6/22   Vikram Mccallum, APRN   lisinopril (PRINIVIL;ZESTRIL) 20 MG tablet TAKE ONE TABLET ONE TIME DAILY 10/6/22   Vikram Mccallum, APRN   gabapentin (NEURONTIN) 600 MG tablet TAKE 1 TABLET BY MOUTH 2 TIMES A DAY 10/6/22 11/14/22  Vikram Mccallum, APRSEBASTIEN   meloxicam (MOBIC) 15 MG tablet TAKE ONE TABLET BY MOUTH ONE TIME DAILY 9/8/22   MARTIN Mendieta   Multiple Vitamins-Minerals (MULTIVITAMIN ADULT PO) Take by mouth daily    Historical Provider, MD   Fexofenadine HCl (ALLEGRA PO) Take by mouth daily     Historical Provider, MD       Past Medical History:  Past Medical History:   Diagnosis Date    Backache, unspecified     Artis's esophagus     Bleeds easily ([address]     Diseases of lips     Esophageal reflux     Glossitis     Hematospermia     Hoarseness     Insomnia, unspecified     Mononeuritis of unspecified site     Neck pain     Neuropathy     nate feet    Other abnormal clinical finding     Other chronic pain     Other malaise and fatigue     Other specified disorders of breast     Sore throat     Unspecified essential hypertension     Urinary tract infection, site not specified        Past Surgical History:  Past Surgical History:   Procedure Laterality Date    Mariea Shock      COLONOSCOPY      Dr Puja Wright @ [facility]-normal per patient    COLONOSCOPY  03/07/2014    Dr Kimberly Reid:  HP, 5y recall    COLONOSCOPY N/A 03/22/2021    Dr Euceda Dock yr recall    Sharmila Wright @ [facility]-Artis's     UPPER GASTROINTESTINAL ENDOSCOPY  02/19/2014    Dr Kimberly Reid:  Biopsy for Artis's surveillance inadequate for evaluation.  3 year repeat UPPER GASTROINTESTINAL ENDOSCOPY N/A 08/06/2019    Dr Cornelius Fung over wire-54 F-Gastropathy, gastric polyps, esophagitis-No recall    UPPER GASTROINTESTINAL ENDOSCOPY  08/06/2019    Dr Cornelius Fung over wire-55 F-Gastropathy, gastric polyps, esophagitis-No recall       Social History:  Social History     Tobacco Use    Smoking status: Never    Smokeless tobacco: Never   Vaping Use    Vaping Use: Never used   Substance Use Topics    Alcohol use: No    Drug use: No       Vital Signs: There were no vitals filed for this visit. Physical Exam:  Cardiac:  [x]WNL  []Comments:  Pulmonary:  [x]WNL   []Comments:  Neuro/Mental Status:  [x]WNL  []Comments:  Abdominal:  [x]WNL    []Comments:  Other:   []WNL  []Comments:    Informed Consent:  The risks and benefits of the procedure have been discussed with either the patient or if they cannot consent, their representative. Assessment:  Patient examined and appropriate for planned sedation and procedure. Plan:  Proceed with planned sedation and procedure as above. Geraldine Sanders am scribing for and in the presence of Dr. Josh Garsia MD.  Electronically signed by Bryce Carlos RN on 12/1/2022 at 9:11 AM    I personally performed the services described in this documentation as scribed by Aguilar Deshpande, and it appears accurate and complete.      Josh Garsia MD  12/1/2022

## 2022-12-07 DIAGNOSIS — Z76.0 MEDICATION REFILL: ICD-10-CM

## 2022-12-07 DIAGNOSIS — M19.90 ARTHRITIS: ICD-10-CM

## 2022-12-07 DIAGNOSIS — M54.16 LUMBAR RADICULAR PAIN: ICD-10-CM

## 2022-12-07 RX ORDER — HYDROXYZINE HYDROCHLORIDE 10 MG/1
TABLET, FILM COATED ORAL
Qty: 60 TABLET | Refills: 1 | Status: SHIPPED | OUTPATIENT
Start: 2022-12-07

## 2022-12-07 RX ORDER — GABAPENTIN 600 MG/1
TABLET ORAL
Qty: 180 TABLET | Refills: 0 | Status: SHIPPED | OUTPATIENT
Start: 2022-12-07 | End: 2023-01-15

## 2022-12-07 RX ORDER — MELOXICAM 15 MG/1
TABLET ORAL
Qty: 90 TABLET | Refills: 3 | Status: SHIPPED | OUTPATIENT
Start: 2022-12-07

## 2022-12-07 RX ORDER — OMEPRAZOLE 40 MG/1
CAPSULE, DELAYED RELEASE ORAL
Qty: 180 CAPSULE | Refills: 3 | Status: SHIPPED | OUTPATIENT
Start: 2022-12-07

## 2022-12-07 RX ORDER — GABAPENTIN 100 MG/1
100 CAPSULE ORAL DAILY
Qty: 30 CAPSULE | Refills: 3 | Status: SHIPPED | OUTPATIENT
Start: 2022-12-07 | End: 2023-01-06

## 2022-12-07 NOTE — TELEPHONE ENCOUNTER
Adrianna Weller called to request a refill on his medication. Requesting 90 day for all       Last office visit : 10/13/2022   Next office visit : Visit date not found     Last UDS:   Amphetamine Screen, Urine   Date Value Ref Range Status   03/29/2022 -  Final     Barbiturate Screen, Urine   Date Value Ref Range Status   03/29/2022 -  Final     Benzodiazepine Screen, Urine   Date Value Ref Range Status   03/29/2022 -  Final     Buprenorphine Urine   Date Value Ref Range Status   03/29/2022 -  Final     Cocaine Metabolite Screen, Urine   Date Value Ref Range Status   03/29/2022 -  Final     Gabapentin Screen, Urine   Date Value Ref Range Status   03/29/2022 -  Final     MDMA, Urine   Date Value Ref Range Status   03/29/2022 -  Final     Methamphetamine, Urine   Date Value Ref Range Status   03/29/2022 -  Final     Opiate Scrn, Ur   Date Value Ref Range Status   03/29/2022 -  Final     Oxycodone Screen, Ur   Date Value Ref Range Status   03/29/2022 -  Final     PCP Screen, Urine   Date Value Ref Range Status   03/29/2022 -  Final     Propoxyphene Screen, Urine   Date Value Ref Range Status   03/29/2022 -  Final     THC Screen, Urine   Date Value Ref Range Status   03/29/2022 -  Final     Tricyclic Antidepressants, Urine   Date Value Ref Range Status   03/29/2022 -  Final       Last Debbe Beer: 12/7/22  Medication Contract: 3/29/22   Last Fill: 11/7/22    Requested Prescriptions     Pending Prescriptions Disp Refills    gabapentin (NEURONTIN) 100 MG capsule 30 capsule 3     Sig: Take 1 capsule by mouth daily for 30 days.     gabapentin (NEURONTIN) 600 MG tablet 180 tablet 0     Sig: TAKE 1 TABLET BY MOUTH 2 TIMES A DAY    omeprazole (PRILOSEC) 40 MG delayed release capsule 180 capsule 3     Sig: TAKE ONE CAPSULE TWO TIMES A DAY    hydrOXYzine HCl (ATARAX) 10 MG tablet 60 tablet 1     Sig: TAKE ONE TABLET BY MOUTH TWO TIMES A DAY AS NEEDED FOR ITCHING OR ANXIETY    meloxicam (MOBIC) 15 MG tablet 90 tablet 3 Please approve or refuse this medication.    Khadra Ewing MA

## 2022-12-16 ENCOUNTER — HOSPITAL ENCOUNTER (OUTPATIENT)
Dept: PREADMISSION TESTING | Age: 62
Discharge: HOME | End: 2022-12-20
Payer: COMMERCIAL

## 2022-12-16 ENCOUNTER — OFFICE VISIT (OUTPATIENT)
Dept: FAMILY MEDICINE CLINIC | Age: 62
End: 2022-12-16

## 2022-12-16 ENCOUNTER — HOSPITAL ENCOUNTER (OUTPATIENT)
Dept: GENERAL RADIOLOGY | Age: 62
Discharge: HOME | End: 2022-12-16
Payer: COMMERCIAL

## 2022-12-16 VITALS
TEMPERATURE: 97.6 F | HEART RATE: 78 BPM | HEIGHT: 69 IN | DIASTOLIC BLOOD PRESSURE: 80 MMHG | WEIGHT: 218 LBS | SYSTOLIC BLOOD PRESSURE: 124 MMHG | BODY MASS INDEX: 32.29 KG/M2 | OXYGEN SATURATION: 98 %

## 2022-12-16 VITALS — HEIGHT: 69 IN | WEIGHT: 220.8 LBS | BODY MASS INDEX: 32.7 KG/M2

## 2022-12-16 DIAGNOSIS — G89.29 CHRONIC PAIN OF RIGHT KNEE: ICD-10-CM

## 2022-12-16 DIAGNOSIS — G47.33 OSA (OBSTRUCTIVE SLEEP APNEA): Primary | ICD-10-CM

## 2022-12-16 DIAGNOSIS — M25.561 CHRONIC PAIN OF RIGHT KNEE: ICD-10-CM

## 2022-12-16 DIAGNOSIS — R06.81 APNEIC SPELL: ICD-10-CM

## 2022-12-16 PROCEDURE — 73560 X-RAY EXAM OF KNEE 1 OR 2: CPT

## 2022-12-16 PROCEDURE — 73560 X-RAY EXAM OF KNEE 1 OR 2: CPT | Performed by: RADIOLOGY

## 2022-12-16 ASSESSMENT — ENCOUNTER SYMPTOMS
EYES NEGATIVE: 1
GASTROINTESTINAL NEGATIVE: 1
APNEA: 1

## 2022-12-16 NOTE — DISCHARGE INSTRUCTIONS
The day before surgery you will receive a phone call from the surgery nurse to let you know what time to arrive on the day of surgery. This call will usually be between 2-4 PM. If you do not receive a phone call by 4 PM the day before your surgery please call [phone] and let them know you have not received an arrival time. If your surgery is on Monday, your call will be on the Friday before your Monday surgery. The morning of surgery, you may take all your prescribed medications with a sip of water. Any exceptions to this would be listed below:    Do not take your lisinopril the morning of surgery. PREOPERATIVE GUIDELINES WHEN RECEIVING ANESTHESIA    Do not eat or drink anything after midnight, the night before your surgery. This is extremely important for your safety. Take a bath (or shower) the night before your surgery and you may brush your teeth the morning of your surgery. You will be scheduled to arrive at the hospital 2 hours before your surgery, or follow your surgeon's instructions. Dress comfortably. Wear loose clothing that will be easy to remove and comfortable for your trip home. You may wear eyeglasses or contacts but bring your cases with you as they must be remove before your surgery. Hearing aids and dentures will need to be removed before your surgery. Do not wear any jewelry, including body jewelry. All jewelry will need to be removed prior to your surgery. Do not wear fingernail polish or make-up. It is best not to bring any valuables with you. If you are to stay in the hospital overnight, bring your robe, slippers and personal toiletries that you may need. POSTOPERATIVE GUIDELINES AFTER RECEIVING ANESTHESIA    If you are to go home after your surgery, you will need a responsible adult to drive you home.      You will not be able to take public transportation after your discharge from the Operative Care Unit unless you are accompanied by a responsible adult. On returning home, be sure to follow your physician's orders regarding diet, activity and medications. Remember, surgery with general anesthesia or sedation may leave you sleepy, very tired and with a decreased appetite for 12 to 24 hours. If you develop any post-surgical complications or problems, call your surgeon or [facility] ([phone]). [address] [place] [address] for Surgery Patients-Revised 6-    Visitors for surgery patients are essential for the patient's emotional well-being and care       post operatively. 2.   Visitor Expectations and Limitations        3. One visitor allowed with patients in the preop/postop rooms. 4.  A second visitor may sit in the waiting area. 5.  No children under 13 allowed in the pre-post op areas unless they are the patient. 6.  Two people may be with an underage surgical/procedural patient in preop/postop        room. 7.  If you are admitted to the hospital post operatively, there are NO RESTRICTIONS on       the floor at this time. 8.  If you are admitted to ICU postoperatively, you may have one visitor in the room from        7A-7P. A second visitor may sit in the ICU waiting room. No overnight visitors in         ICU waiting room.

## 2022-12-16 NOTE — PROGRESS NOTES
[facility] PHYSICIAN SERVICES  [facility] [place]  [address]  [address] [address]  Dept: [phone]  Dept Fax: [phone]  Loc: [phone]    Adrianna Weller is a 65 y.o. male who presents today for his medical conditions/complaints as noted below. Adrianna Weller is c/o of Other (Discuss having a sleep study )      Chief Complaint   Patient presents with    Other     Discuss having a sleep study        HPI:     HPI  Patient presents today to discuss having a sleep study completed. Patient states that he is waking himself snoring & has a dry throat when he wakes up. He has had multiple apneic spells  He states that he is having significant R knee pain. Past Medical History:   Diagnosis Date    Backache, unspecified     Artis's esophagus     Bleeds easily (HCC)     Diseases of lips     Esophageal reflux     Glossitis     Hematospermia     Hoarseness     Insomnia, unspecified     Mononeuritis of unspecified site     Neck pain     Neuropathy     nate feet    Other abnormal clinical finding     Other chronic pain     Other malaise and fatigue     Other specified disorders of breast     Sore throat     Unspecified essential hypertension     Urinary tract infection, site not specified         Past Surgical History:   Procedure Laterality Date    APPENDECTOMY      CARPAL TUNNEL RELEASE Right 12/19/2022    RIGHT OPEN CARPAL TUNNEL RELEASE performed by Carlitos Bates MD at [address]      Dr Kayleigh Alicea @ [facility] [address] per patient    COLONOSCOPY  03/07/2014    Dr Macias Larger:  HP, 5y recall    COLONOSCOPY N/A 03/22/2021    Dr Ronal Pierre yr recall    [facility]      Dr Kayleigh Alicea @ [facility]-Artis's     UPPER GASTROINTESTINAL ENDOSCOPY  02/19/2014    Dr Macias Larger:  Biopsy for Artis's surveillance inadequate for evaluation.  3 year repeat    UPPER GASTROINTESTINAL ENDOSCOPY N/A 08/06/2019    Dr Shafer Sis over [address]-55 F-Gastropathy, gastric polyps, esophagitis-No recall    UPPER GASTROINTESTINAL ENDOSCOPY  08/06/2019    Dr Haylee Lim over wire-55 F-Gastropathy, gastric polyps, esophagitis-No recall    UPPER GASTROINTESTINAL ENDOSCOPY N/A 12/01/2022    Dr OCTAVIO Sherman-[id number]-appearing stricture in the distal esophagus-Esophagitis, no Artis's    UPPER GASTROINTESTINAL ENDOSCOPY N/A 12/01/2022    Dr OCTAVIO Sherman-[id number]-appearing stricture in the distal esophagus-Esophagitis, no Artis's       Social History     Tobacco Use    Smoking status: Never    Smokeless tobacco: Never   Substance Use Topics    Alcohol use: No        Current Outpatient Medications   Medication Sig Dispense Refill    gabapentin (NEURONTIN) 100 MG capsule Take 1 capsule by mouth daily for 30 days. 30 capsule 3    gabapentin (NEURONTIN) 600 MG tablet TAKE 1 TABLET BY MOUTH 2 TIMES A DAY (Patient taking differently: Take 600 mg by mouth nightly.  TAKE 1 TABLET BY MOUTH 2 TIMES A DAY) 180 tablet 0    omeprazole (PRILOSEC) 40 MG delayed release capsule TAKE ONE CAPSULE TWO TIMES A DAY (Patient taking differently: Take 40 mg by mouth in the morning and at bedtime TAKE ONE CAPSULE TWO TIMES A DAY) 180 capsule 3    hydrOXYzine HCl (ATARAX) 10 MG tablet TAKE ONE TABLET BY MOUTH TWO TIMES A DAY AS NEEDED FOR ITCHING OR ANXIETY (Patient taking differently: Take 10 mg by mouth every 4 hours as needed TAKE ONE TABLET BY MOUTH TWO TIMES A DAY AS NEEDED FOR ITCHING OR ANXIETY) 60 tablet 1    meloxicam (MOBIC) 15 MG tablet TAKE ONE TABLET BY MOUTH ONE TIME DAILY (Patient taking differently: Take 15 mg by mouth daily TAKE ONE TABLET BY MOUTH ONE TIME DAILY) 90 tablet 3    tiZANidine (ZANAFLEX) 4 MG tablet TAKE ONE TABLET BY MOUTH EVERY 6 HOURS AS NEEDED FOR MUSCLE SPASMS (Patient taking differently: Take 4 mg by mouth every 6 hours as needed TAKE ONE TABLET BY MOUTH EVERY 6 HOURS AS NEEDED FOR MUSCLE SPASMS) 60 tablet 1    montelukast (SINGULAIR) 10 MG tablet TAKE 1 TABLET BY MOUTH ONE TIME A DAY (Patient taking differently: Take 10 mg by mouth nightly TAKE 1 TABLET BY MOUTH ONE TIME A DAY) 90 tablet 3    lisinopril (PRINIVIL;ZESTRIL) 20 MG tablet TAKE ONE TABLET ONE TIME DAILY (Patient taking differently: Take 20 mg by mouth daily TAKE ONE TABLET ONE TIME DAILY) 90 tablet 1    Fexofenadine HCl (ALLEGRA PO) Take 1 tablet by mouth daily       No current facility-administered medications for this visit. Allergies   Allergen Reactions    Iodine Rash     Skin peels, feet and hands turn red       Family History   Problem Relation Age of Onset    Diabetes Mother     Diabetes Father     Heart Disease Father     Colon Polyps Maternal Grandmother     Crohn's Disease Daughter     Crohn's Disease Other     Colon Cancer Neg Hx     Liver Cancer Neg Hx     Stomach Cancer Neg Hx     Rectal Cancer Neg Hx     Esophageal Cancer Neg Hx     Liver Disease Neg Hx          Subjective:      Review of Systems   Constitutional:  Positive for fatigue. HENT: Negative. Eyes: Negative. Respiratory:  Positive for apnea. Cardiovascular: Negative. Gastrointestinal: Negative. Endocrine: Negative. Genitourinary: Negative. Musculoskeletal:  Positive for arthralgias (right knee). Skin: Negative. Neurological:  Positive for weakness. Hematological: Negative. Psychiatric/Behavioral: Negative. Objective:     Physical Exam  Vitals and nursing note reviewed. Constitutional:       Appearance: Normal appearance. HENT:      Head: Normocephalic and atraumatic. Right Ear: Hearing, tympanic membrane, ear canal and external ear normal.      Left Ear: Hearing, tympanic membrane, ear canal and external ear normal.      Nose: Nose normal.      Mouth/Throat:      Lips: Pink. Mouth: Mucous membranes are moist.      Pharynx: Oropharynx is clear. Eyes:      General: Lids are normal.      Extraocular Movements: Extraocular movements intact.       Conjunctiva/sclera: Conjunctivae normal. Pupils: Pupils are equal, round, and reactive to light. Neck:      Thyroid: No thyromegaly. Cardiovascular:      Rate and Rhythm: Normal rate and regular rhythm. Pulses: Normal pulses. Dorsalis pedis pulses are 2+ on the right side and 2+ on the left side. Posterior tibial pulses are 2+ on the right side and 2+ on the left side. Heart sounds: Normal heart sounds. Pulmonary:      Effort: Pulmonary effort is normal.      Breath sounds: Normal breath sounds and air entry. Abdominal:      General: Bowel sounds are normal.      Palpations: Abdomen is soft. Musculoskeletal:      Cervical back: Full passive range of motion without pain, normal range of motion and neck supple. Thoracic back: No tenderness. Normal range of motion. Lumbar back: No tenderness. Normal range of motion. Right knee: Decreased range of motion. Tenderness present. Lymphadenopathy:      Cervical: No cervical adenopathy. Skin:     General: Skin is warm and dry. Capillary Refill: Capillary refill takes less than 2 seconds. Neurological:      General: No focal deficit present. Mental Status: He is alert and oriented to person, place, and time. Mental status is at baseline. Coordination: Coordination is intact. Psychiatric:         Mood and Affect: Mood normal.         Speech: Speech normal.         Behavior: Behavior normal.         Thought Content: Thought content normal.         Cognition and Memory: Cognition and memory normal.         Judgment: Judgment normal.       /80   Pulse 78   Temp 97.6 °F (36.4 °C)   Ht 5' 9\" (1.753 m)   Wt 218 lb (98.9 kg)   SpO2 98%   BMI 32.19 kg/m²     Assessment:      Diagnosis Orders   1. SPENSER (obstructive sleep apnea)  Home Sleep Study      2. Apneic spell  Home Sleep Study      3. Chronic pain of right knee  XR KNEE RIGHT (1-2 VIEWS)          No results found for this visit on 12/16/22.     Plan:     1. SPENSER (obstructive sleep apnea)    - Home Sleep Study; Future    2. Apneic spell  Home sleep study ordered to rule out sleep apnea. - Home Sleep Study; Future    3. Chronic pain of right knee  Checking xray of right knee. Will plan on joint injection if arthritis is noted. - XR KNEE RIGHT (1-2 VIEWS); Future     Return if symptoms worsen or fail to improve. Orders Placed This Encounter   Procedures    XR KNEE RIGHT (1-2 VIEWS)     Standing Status:   Future     Number of Occurrences:   1     Standing Expiration Date:   12/16/2023    Home Sleep Study     Standing Status:   Future     Standing Expiration Date:   12/16/2023     Order Specific Question:   Location For Sleep Study     Answer:   [facility]     Order Specific Question:   Select Sleep Lab Location     Answer:   [facility]       No orders of the defined types were placed in this encounter. Patient offered educational handouts and has had all questions answered. Patient voices understanding and agrees to plans along with risks and benefits of plan. Patient is instructed to continue prior meds, diet, and exercise plans as instructed. Patient agrees to follow up as instructed and sooner if needed. Patient agrees to go to ER if condition becomes emergent. EMR Dragon/transcription disclaimer: Some of this encounter note is an electronic transcription/translation of spoken language to printed text. The electronic translation of spoken language may permit erroneous, or at times, nonsensical words or phrases to be inadvertently transcribed.  Although I have reviewed the note for such errors, some may still exist.    Electronically signed by MARTIN Miller on 12/21/2022 at 7:16 AM

## 2022-12-18 LAB
EKG P AXIS: 24 DEGREES
EKG P-R INTERVAL: 162 MS
EKG Q-T INTERVAL: 378 MS
EKG QRS DURATION: 98 MS
EKG QTC CALCULATION (BAZETT): 399 MS
EKG T AXIS: 32 DEGREES

## 2022-12-19 ENCOUNTER — ANESTHESIA EVENT (OUTPATIENT)
Dept: OPERATING ROOM | Age: 62
End: 2022-12-19
Payer: COMMERCIAL

## 2022-12-19 ENCOUNTER — ANESTHESIA (OUTPATIENT)
Dept: OPERATING ROOM | Age: 62
End: 2022-12-19
Payer: COMMERCIAL

## 2022-12-19 ENCOUNTER — HOSPITAL ENCOUNTER (OUTPATIENT)
Age: 62
Setting detail: OUTPATIENT SURGERY
Discharge: HOME | End: 2022-12-19
Attending: ORTHOPAEDIC SURGERY | Admitting: ORTHOPAEDIC SURGERY
Payer: COMMERCIAL

## 2022-12-19 VITALS
HEIGHT: 69 IN | OXYGEN SATURATION: 95 % | DIASTOLIC BLOOD PRESSURE: 74 MMHG | RESPIRATION RATE: 16 BRPM | SYSTOLIC BLOOD PRESSURE: 154 MMHG | TEMPERATURE: 98.8 F | BODY MASS INDEX: 32.29 KG/M2 | WEIGHT: 218 LBS | HEART RATE: 68 BPM

## 2022-12-19 PROBLEM — G56.01 CARPAL TUNNEL SYNDROME ON RIGHT: Status: ACTIVE | Noted: 2022-12-19

## 2022-12-19 PROCEDURE — 7100000011 HC PHASE II RECOVERY - ADDTL 15 MIN: Performed by: ORTHOPAEDIC SURGERY

## 2022-12-19 PROCEDURE — 7100000001 HC PACU RECOVERY - ADDTL 15 MIN: Performed by: ORTHOPAEDIC SURGERY

## 2022-12-19 PROCEDURE — 2500000003 HC RX 250 WO HCPCS: Performed by: NURSE ANESTHETIST, CERTIFIED REGISTERED

## 2022-12-19 PROCEDURE — 2500000003 HC RX 250 WO HCPCS: Performed by: ORTHOPAEDIC SURGERY

## 2022-12-19 PROCEDURE — 2580000003 HC RX 258: Performed by: ORTHOPAEDIC SURGERY

## 2022-12-19 PROCEDURE — 2709999900 HC NON-CHARGEABLE SUPPLY: Performed by: ORTHOPAEDIC SURGERY

## 2022-12-19 PROCEDURE — 2580000003 HC RX 258: Performed by: NURSE ANESTHETIST, CERTIFIED REGISTERED

## 2022-12-19 PROCEDURE — 7100000000 HC PACU RECOVERY - FIRST 15 MIN: Performed by: ORTHOPAEDIC SURGERY

## 2022-12-19 PROCEDURE — 3600000003 HC SURGERY LEVEL 3 BASE: Performed by: ORTHOPAEDIC SURGERY

## 2022-12-19 PROCEDURE — 6370000000 HC RX 637 (ALT 250 FOR IP): Performed by: ORTHOPAEDIC SURGERY

## 2022-12-19 PROCEDURE — 3700000001 HC ADD 15 MINUTES (ANESTHESIA): Performed by: ORTHOPAEDIC SURGERY

## 2022-12-19 PROCEDURE — 3600000013 HC SURGERY LEVEL 3 ADDTL 15MIN: Performed by: ORTHOPAEDIC SURGERY

## 2022-12-19 PROCEDURE — 6360000002 HC RX W HCPCS: Performed by: ORTHOPAEDIC SURGERY

## 2022-12-19 PROCEDURE — 2580000003 HC RX 258: Performed by: ANESTHESIOLOGY

## 2022-12-19 PROCEDURE — 7100000010 HC PHASE II RECOVERY - FIRST 15 MIN: Performed by: ORTHOPAEDIC SURGERY

## 2022-12-19 PROCEDURE — 6360000002 HC RX W HCPCS: Performed by: NURSE ANESTHETIST, CERTIFIED REGISTERED

## 2022-12-19 PROCEDURE — 3700000000 HC ANESTHESIA ATTENDED CARE: Performed by: ORTHOPAEDIC SURGERY

## 2022-12-19 RX ORDER — ONDANSETRON 2 MG/ML
INJECTION INTRAMUSCULAR; INTRAVENOUS PRN
Status: DISCONTINUED | OUTPATIENT
Start: 2022-12-19 | End: 2022-12-19 | Stop reason: SDUPTHER

## 2022-12-19 RX ORDER — PROPOFOL 10 MG/ML
INJECTION, EMULSION INTRAVENOUS PRN
Status: DISCONTINUED | OUTPATIENT
Start: 2022-12-19 | End: 2022-12-19 | Stop reason: SDUPTHER

## 2022-12-19 RX ORDER — DEXAMETHASONE SODIUM PHOSPHATE 10 MG/ML
INJECTION, SOLUTION INTRAMUSCULAR; INTRAVENOUS PRN
Status: DISCONTINUED | OUTPATIENT
Start: 2022-12-19 | End: 2022-12-19 | Stop reason: SDUPTHER

## 2022-12-19 RX ORDER — FENTANYL CITRATE 50 UG/ML
INJECTION, SOLUTION INTRAMUSCULAR; INTRAVENOUS PRN
Status: DISCONTINUED | OUTPATIENT
Start: 2022-12-19 | End: 2022-12-19 | Stop reason: SDUPTHER

## 2022-12-19 RX ORDER — SODIUM CHLORIDE, SODIUM LACTATE, POTASSIUM CHLORIDE, CALCIUM CHLORIDE 600; 310; 30; 20 MG/100ML; MG/100ML; MG/100ML; MG/100ML
INJECTION, SOLUTION INTRAVENOUS CONTINUOUS
Status: DISCONTINUED | OUTPATIENT
Start: 2022-12-19 | End: 2022-12-19 | Stop reason: HOSPADM

## 2022-12-19 RX ORDER — SODIUM CHLORIDE, SODIUM LACTATE, POTASSIUM CHLORIDE, CALCIUM CHLORIDE 600; 310; 30; 20 MG/100ML; MG/100ML; MG/100ML; MG/100ML
INJECTION, SOLUTION INTRAVENOUS CONTINUOUS PRN
Status: DISCONTINUED | OUTPATIENT
Start: 2022-12-19 | End: 2022-12-19 | Stop reason: SDUPTHER

## 2022-12-19 RX ORDER — MIDAZOLAM HYDROCHLORIDE 1 MG/ML
INJECTION INTRAMUSCULAR; INTRAVENOUS PRN
Status: DISCONTINUED | OUTPATIENT
Start: 2022-12-19 | End: 2022-12-19 | Stop reason: SDUPTHER

## 2022-12-19 RX ORDER — LIDOCAINE HYDROCHLORIDE 10 MG/ML
INJECTION, SOLUTION EPIDURAL; INFILTRATION; INTRACAUDAL; PERINEURAL PRN
Status: DISCONTINUED | OUTPATIENT
Start: 2022-12-19 | End: 2022-12-19 | Stop reason: SDUPTHER

## 2022-12-19 RX ORDER — BISMUTH TRIBROMOPH/PETROLATUM 5"X9"
BANDAGE TOPICAL PRN
Status: DISCONTINUED | OUTPATIENT
Start: 2022-12-19 | End: 2022-12-19 | Stop reason: ALTCHOICE

## 2022-12-19 RX ADMIN — CEFAZOLIN 2000 MG: 2 INJECTION, POWDER, FOR SOLUTION INTRAMUSCULAR; INTRAVENOUS at 11:24

## 2022-12-19 RX ADMIN — SODIUM CHLORIDE, SODIUM LACTATE, POTASSIUM CHLORIDE, AND CALCIUM CHLORIDE: 600; 310; 30; 20 INJECTION, SOLUTION INTRAVENOUS at 11:05

## 2022-12-19 RX ADMIN — PROPOFOL 200 MG: 10 INJECTION, EMULSION INTRAVENOUS at 11:20

## 2022-12-19 RX ADMIN — FENTANYL CITRATE 50 MCG: 50 INJECTION, SOLUTION INTRAMUSCULAR; INTRAVENOUS at 11:23

## 2022-12-19 RX ADMIN — ONDANSETRON 4 MG: 2 INJECTION INTRAMUSCULAR; INTRAVENOUS at 11:20

## 2022-12-19 RX ADMIN — SODIUM CHLORIDE, SODIUM LACTATE, POTASSIUM CHLORIDE, AND CALCIUM CHLORIDE: 600; 310; 30; 20 INJECTION, SOLUTION INTRAVENOUS at 11:14

## 2022-12-19 RX ADMIN — MIDAZOLAM 2 MG: 1 INJECTION INTRAMUSCULAR; INTRAVENOUS at 11:14

## 2022-12-19 RX ADMIN — LIDOCAINE HYDROCHLORIDE 50 MG: 10 INJECTION, SOLUTION EPIDURAL; INFILTRATION; INTRACAUDAL; PERINEURAL at 11:20

## 2022-12-19 RX ADMIN — FENTANYL CITRATE 50 MCG: 50 INJECTION, SOLUTION INTRAMUSCULAR; INTRAVENOUS at 11:36

## 2022-12-19 RX ADMIN — DEXAMETHASONE SODIUM PHOSPHATE 10 MG: 10 INJECTION, SOLUTION INTRAMUSCULAR; INTRAVENOUS at 11:21

## 2022-12-19 ASSESSMENT — PAIN - FUNCTIONAL ASSESSMENT: PAIN_FUNCTIONAL_ASSESSMENT: 0-10

## 2022-12-19 ASSESSMENT — PAIN DESCRIPTION - DESCRIPTORS: DESCRIPTORS: ACHING;CRAMPING

## 2022-12-19 NOTE — OP NOTE
Patient Name: Reza Torres  : 1960  MRN: [id number]      DATE of SURGERY: 2022    SURGEON: Romie Lomeli MD    ASSISTANT: NONE    PREOPERATIVE DIAGNOSIS    1. Right carpal tunnel syndrome  2. Artis's esophagus  3. Gastroesophageal reflux disease  4. Obesity     POSTOPERATIVE DIAGNOSIS    1. Right carpal tunnel syndrome  2. Artis's esophagus  3. Gastroesophageal reflux disease  4. Obesity     PROCEDURE PERFORMED    1. Right open carpal tunnel release     IMPLANTS  None. ANESTHESIA    LMA with local     OPERATIVE INDICATIONS    The patient is a 65 y.o. male who presented to my clinic with complaints of numbness and tingling in the hand with clinical exam and neurodiagnostic evidence of carpal tunnel syndrome. The patient wished to proceed with surgery, understanding the risks, benefits, and alternatives. Conservative treatment failed to improve symptoms. The risks include, but are not limited to, that of anesthesia, bleeding, infection, pain, damage to the motor and sensory branch of the median nerve, chronic pillar pain, incomplete resolution of symptoms. ESTIMATED BLOOD LOSS    Less than 5 mL. SPECIMENS    None. DRAINS  None. COMPLICATIONS    None. PROCEDURE IN DETAIL    The patient was seen in the preoperative holding room and the informed consent form was reviewed with the patient. The site of surgery was marked with the patient's agreement. After being transferred to the operating room, a timeout was performed identifying the correct patient and the operative site. Perioperative antibiotics were administered. The tourniquet was then placed on the brachium of the operative extremity. A sterile prep and drape was performed. Monitored anesthesia care was then induced.      A skin marker was used to delineate an approximately 1.5 cm incision in line with the radial aspect of the fourth ray beginning proximal to Garcia's cardinal line and ending distal to the wrist flexion crease. The right upper extremity was then exsanguinated with an Esmarch and the tourniquet was inflated to 250 mmHg for less than 10 minutes. A 15 blade scalpel was used to incise only through the skin. Soft tissue was dissected in line with the incision through the palmar fascia. The transverse carpal ligament was identified and beginning distally, while protecting the superficial radial arch of vessels, it was incised. In a similar fashion, the proximal aspect of the transverse carpal ligament was released with both sharp and blunt tip scissor release. Finally, a pair of blunt-tipped scissors was inserted with the points directed toward the superficial and ulnar aspect of the wrist to protect the palmar cutaneous branch of the median nerve and the distal forearm fascia was incised. A complete release of the transverse carpal ligament was performed, verified visually and with palpation. The median nerve was inspected and found to be intact. There was no significant hypertrophic tenosynovium or soft tissue masses within the carpal tunnel. The tourniquet was then deflated and hemostasis was obtained with bipolar electrocautery. The incision was irrigated and the skin closed with simple interrupted 4-0 nylon sutures. For postoperative pain control, 10 cc of 1% lidocaine with epinephrine were injected in line with the incision. A soft dressing was placed; the patient was awakened from anesthesia and transported to the recovery room in stable condition. Adequate capillary refill was retained all digits at the conclusion of the procedure. All counts the end the procedure were correct. POSTOPERATIVE PLAN  Discharge home, return to clinic in 2 weeks for suture removal and activity as tolerated.

## 2022-12-19 NOTE — ANESTHESIA PRE PROCEDURE
Department of Anesthesiology  Preprocedure Note       Name:  Flakito Garza   Age:  65 y.o.  :  1960                                          MRN:  [id number]         Date:  2022      Surgeon: Candelario Camp):  Maurice Harrison MD    Procedure: Procedure(s):  RIGHT OPEN CARPAL TUNNEL RELEASE    Medications prior to admission:   Prior to Admission medications    Medication Sig Start Date End Date Taking? Authorizing Provider   gabapentin (NEURONTIN) 100 MG capsule Take 1 capsule by mouth daily for 30 days. 22  MARTIN Shah   gabapentin (NEURONTIN) 600 MG tablet TAKE 1 TABLET BY MOUTH 2 TIMES A DAY  Patient taking differently: Take 600 mg by mouth nightly.  TAKE 1 TABLET BY MOUTH 2 TIMES A DAY 12/7/22 1/15/23  MARTIN Shah   omeprazole (PRILOSEC) 40 MG delayed release capsule TAKE ONE CAPSULE TWO TIMES A DAY  Patient taking differently: Take 40 mg by mouth in the morning and at bedtime TAKE ONE CAPSULE TWO TIMES A DAY 22   MARTIN Shah   hydrOXYzine HCl (ATARAX) 10 MG tablet TAKE ONE TABLET BY MOUTH TWO TIMES A DAY AS NEEDED FOR ITCHING OR ANXIETY  Patient taking differently: Take 10 mg by mouth every 4 hours as needed TAKE ONE TABLET BY MOUTH TWO TIMES A DAY AS NEEDED FOR ITCHING OR ANXIETY 22   MARTIN Shah   meloxicam ([address]) 15 MG tablet TAKE ONE TABLET BY MOUTH ONE TIME DAILY  Patient taking differently: Take 15 mg by mouth daily TAKE ONE TABLET BY MOUTH ONE TIME DAILY 22   MARTIN Shah   tiZANidine (ZANAFLEX) 4 MG tablet TAKE ONE TABLET BY MOUTH EVERY 6 HOURS AS NEEDED FOR MUSCLE SPASMS  Patient taking differently: Take 4 mg by mouth every 6 hours as needed TAKE ONE TABLET BY MOUTH EVERY 6 HOURS AS NEEDED FOR MUSCLE SPASMS 10/6/22   MARTIN Shah   montelukast (SINGULAIR) 10 MG tablet TAKE 1 TABLET BY MOUTH ONE TIME A DAY  Patient taking differently: Take 10 mg by mouth nightly TAKE 1 TABLET BY MOUTH ONE TIME A DAY 10/6/22   MARTIN Shah lisinopril (PRINIVIL;ZESTRIL) 20 MG tablet TAKE ONE TABLET ONE TIME DAILY  Patient taking differently: Take 20 mg by mouth daily TAKE ONE TABLET ONE TIME DAILY 10/6/22   MARTIN Donovan   Fexofenadine HCl (ALLEGRA PO) Take 1 tablet by mouth daily    Historical Provider, MD       Current medications:    No current facility-administered medications for this visit. No current outpatient medications on file. Facility-Administered Medications Ordered in Other Visits   Medication Dose Route Frequency Provider Last Rate Last Admin    lactated ringers infusion   IntraVENous Continuous Jaimie Melvin  mL/hr at 12/19/22 1105 New Bag at 12/19/22 1105    ceFAZolin (ANCEF) 2,000 mg in sterile water 20 mL IV syringe  2,000 mg IntraVENous Once Nolberto Johnson MD           Allergies:     Allergies   Allergen Reactions    Iodine Rash     Skin peels, feet and hands turn red       Problem List:    Patient Active Problem List   Diagnosis Code    GERD (gastroesophageal reflux disease) K21.9    Artis's esophagus K22.70    Nausea R11.0    Internal hemorrhoids K64.8    History of colon polyps Z86.010    Earlobe lesion, right H61.91    Bilateral impacted cerumen H61.23    Carpal tunnel syndrome on right G56.01       Past Medical History:        Diagnosis Date    Backache, unspecified     Artis's esophagus     Bleeds easily ([address]     Diseases of lips     Esophageal reflux     Glossitis     Hematospermia     Hoarseness     Insomnia, unspecified     Mononeuritis of unspecified site     Neck pain     Neuropathy     nate feet    Other abnormal clinical finding     Other chronic pain     Other malaise and fatigue     Other specified disorders of breast     Sore throat     Unspecified essential hypertension     Urinary tract infection, site not specified        Past Surgical History:        Procedure Laterality Date    APPENDECTOMY      CHOLECYSTECTOMY      COLONOSCOPY      Dr Evon Abbott @ [facility]-normal per patient    COLONOSCOPY  03/07/2014    Dr Wu Pankaj:  HP, 5y recall    COLONOSCOPY N/A 03/22/2021    Dr Catherine Saenz yr recall   Haresh Nabil      Dr Linnea Peterson @ [facility]-Artis's     UPPER GASTROINTESTINAL ENDOSCOPY  02/19/2014    Dr Wu Pankaj:  Biopsy for Artis's surveillance inadequate for evaluation. 3 year repeat    UPPER GASTROINTESTINAL ENDOSCOPY N/A 08/06/2019    Dr Hussein Flores over wire-54 F-Gastropathy, gastric polyps, esophagitis-No recall    UPPER GASTROINTESTINAL ENDOSCOPY  08/06/2019    Dr Hussein Flores over wire-54 F-Gastropathy, gastric polyps, esophagitis-No recall    UPPER GASTROINTESTINAL ENDOSCOPY N/A 12/01/2022    Dr OCTAVIO Sherman-w/ied-[id number]-Lxcfrb-appearing stricture in the distal esophagus-Esophagitis, no Artis's    UPPER GASTROINTESTINAL ENDOSCOPY N/A 12/01/2022    Dr OCTAVIO Sherman-w/thk-40O-Wsxmrs-appearing stricture in the distal esophagus-Esophagitis, no Artis's       Social History:    Social History     Tobacco Use    Smoking status: Never    Smokeless tobacco: Never   Substance Use Topics    Alcohol use: No                                Counseling given: Not Answered      Vital Signs (Current): There were no vitals filed for this visit.                                            BP Readings from Last 3 Encounters:   12/19/22 (!) 169/82   12/16/22 124/80   12/01/22 123/76       NPO Status:                                                                                 BMI:   Wt Readings from Last 3 Encounters:   12/19/22 218 lb (98.9 kg)   12/16/22 218 lb (98.9 kg)   12/16/22 220 lb 12.8 oz (100.2 kg)     There is no height or weight on file to calculate BMI.    CBC:   Lab Results   Component Value Date/Time    WBC 6.7 10/13/2022 03:03 PM    RBC 5.24 10/13/2022 03:03 PM    HGB 16.5 10/13/2022 03:03 PM    HCT 49.1 10/13/2022 03:03 PM    MCV 93.7 10/13/2022 03:03 PM    RDW 12.4 10/13/2022 03:03 PM     10/13/2022 03:03 PM       CMP: Lab Results   Component Value Date/Time     10/13/2022 03:03 PM    K 3.7 10/13/2022 03:03 PM     10/13/2022 03:03 PM    CO2 26 10/13/2022 03:03 PM    BUN 22 10/13/2022 03:03 PM    CREATININE 0.9 10/13/2022 03:03 PM    GFRAA >59 10/13/2022 03:03 PM    LABGLOM >60 10/13/2022 03:03 PM    GLUCOSE 68 10/13/2022 03:03 PM    PROT 7.0 03/15/2021 08:18 AM    CALCIUM 9.1 10/13/2022 03:03 PM    BILITOT 0.6 03/15/2021 08:18 AM    ALKPHOS 114 03/15/2021 08:18 AM    AST 24 03/15/2021 08:18 AM    ALT 23 03/15/2021 08:18 AM       POC Tests: No results for input(s): POCGLU, POCNA, POCK, POCCL, POCBUN, POCHEMO, POCHCT in the last 72 hours. Coags: No results found for: PROTIME, INR, APTT    HCG (If Applicable): No results found for: PREGTESTUR, PREGSERUM, HCG, HCGQUANT     ABGs: No results found for: PHART, PO2ART, TQA6QAE, FCA8GRQ, BEART, B2ILJKRL     Type & Screen (If Applicable):  No results found for: LABABO, LABRH    Drug/Infectious Status (If Applicable):  No results found for: HIV, HEPCAB    COVID-19 Screening (If Applicable):   Lab Results   Component Value Date/Time    COVID19 NOT DETECTED 03/18/2021 08:05 AM           Anesthesia Evaluation  Patient summary reviewed and Nursing notes reviewed no history of anesthetic complications:   Airway: Mallampati: II  TM distance: >3 FB   Neck ROM: full  Mouth opening: < 3 FB   Dental: normal exam         Pulmonary:Negative Pulmonary ROS and normal exam              Patient did not smoke on day of surgery. ROS comment: hoarseness   Cardiovascular:  Exercise tolerance: no interval change,   (+) hypertension: moderate and no interval change,          Beta Blocker:  Not on Beta Blocker         Neuro/Psych:   Negative Neuro/Psych ROS               ROS comment: Neuropathy GI/Hepatic/Renal:   (+) GERD: well controlled,          ROS comment: Artis's esophagus.    Endo/Other: Negative Endo/Other ROS   (+) : arthritis: OA., .                 Abdominal: Vascular: negative vascular ROS. Other Findings:             Anesthesia Plan      general     ASA 2       Induction: intravenous. MIPS: Postoperative opioids intended and Prophylactic antiemetics administered. Anesthetic plan and risks discussed with patient.                         MARTIN Rudolph - JUSTYNA   12/19/2022

## 2022-12-19 NOTE — DISCHARGE INSTRUCTIONS
[facility] - Dr. Dexter Fuchs    IMPORTANT PHONE NUMBERS:   For emergencies, please call [phone]   You may reach Dr. Dexter Fuchs and clinical staff at [phone] M-F 8:00 am-5:00 pm   After 5pm or on the weekends, please call [phone]   Call immediately if you have any of the following symptoms:     Elevated temperature above 101.5 degrees for more than 48 hours after surgery     Persistent drainage from wound     Severe pain around surgical site    Sling use: The sling is provided for your comfort and to ensure proper healing of your repair following surgery. Please place the abduction pillow with the curved side against your side and the sling on the side of the pillow. Your surgery requires that you wear the sling if noted below. _x___ No sling required    Bathing:  _x__You may remove you dressing and shower on the 3rd day after surgery   ** if you are told to it is ok to remove your dressing and shower, DO NOT SOAK your incisions in a tub. NO lotions or ointments to incision. Dressings: Keep dressing/splint intact unless instructed otherwise below. SOME DRAINAGE IS NORMAL! DO NOT touch or apply ointment to the incision. DO NOT remove the steri-strips over the incisions (if you have steri-strips). They will         generally fall off on their own or can be removed 1 week after surgery. If you have yellow gauze and it comes off, do not worry about it. Leave them off. Signs of infection that warrant a phone call to our clinical line:     o Excessive drainage or redness     o Red streaking coming away from the incision     o Increased pain     o Increased temperature above 101 degrees      Activity:  Elevate right upper extremity. Encourage gentle finger range of motion. No heavy lifting or high impact activities with the right hand, greater than 5 to 10 pounds, until follow-up. Medications: Pain medication as prescribed.   No additional Tylenol, alcohol or driving on opioid pain medication. Wean off pain medication as able.       **If you are running low on pain medications, please notify us if you need a refill 24-48 hours prior to when you run out, so we can make arrangements to refill the prescription for you if we determine is necessary

## 2022-12-19 NOTE — ANESTHESIA POSTPROCEDURE EVALUATION
Department of Anesthesiology  Postprocedure Note    Patient: Norma Marcos  MRN: [id number]  YOB: 1960  Date of evaluation: 12/19/2022      Procedure Summary     Date: 12/19/22 Room / Location: [address]    Anesthesia Start: 1114 Anesthesia Stop: 1157    Procedure: RIGHT OPEN CARPAL TUNNEL RELEASE (Right: Wrist) Diagnosis:       Carpal tunnel syndrome on right      (Carpal tunnel syndrome on right [G56.01])    Surgeons: Elaine Calderon MD Responsible Provider: MARTIN Saunders CRNA    Anesthesia Type: general ASA Status: 2          Anesthesia Type: No value filed.     Lis Phase I: Lis Score: 10    Lis Phase II:        Anesthesia Post Evaluation    Patient location during evaluation: bedside  Patient participation: complete - patient participated  Level of consciousness: awake and alert  Pain score: 0  Airway patency: patent  Nausea & Vomiting: no nausea  Complications: no  Cardiovascular status: hemodynamically stable  Respiratory status: acceptable  Hydration status: euvolemic

## 2022-12-20 ENCOUNTER — PATIENT MESSAGE (OUTPATIENT)
Dept: FAMILY MEDICINE CLINIC | Age: 62
End: 2022-12-20

## 2022-12-30 ENCOUNTER — HOSPITAL ENCOUNTER (OUTPATIENT)
Dept: GENERAL RADIOLOGY | Age: 62
Discharge: HOME | End: 2022-12-30
Payer: COMMERCIAL

## 2022-12-30 ENCOUNTER — OFFICE VISIT (OUTPATIENT)
Age: 62
End: 2022-12-30

## 2022-12-30 VITALS
WEIGHT: 214 LBS | HEIGHT: 69 IN | DIASTOLIC BLOOD PRESSURE: 62 MMHG | TEMPERATURE: 98.2 F | OXYGEN SATURATION: 98 % | SYSTOLIC BLOOD PRESSURE: 120 MMHG | BODY MASS INDEX: 31.7 KG/M2 | HEART RATE: 72 BPM

## 2022-12-30 DIAGNOSIS — R33.9 URINARY RETENTION: ICD-10-CM

## 2022-12-30 DIAGNOSIS — R05.2 SUBACUTE COUGH: Primary | ICD-10-CM

## 2022-12-30 DIAGNOSIS — R05.2 SUBACUTE COUGH: ICD-10-CM

## 2022-12-30 LAB
APPEARANCE FLUID: CLEAR
BILIRUBIN, POC: NORMAL
BLOOD URINE, POC: NORMAL
CLARITY, POC: CLEAR
COLOR, POC: YELLOW
GLUCOSE URINE, POC: NORMAL
KETONES, POC: NORMAL
LEUKOCYTE EST, POC: NORMAL
NITRITE, POC: NORMAL
PH, POC: 5.5
PROSTATE SPECIFIC ANTIGEN: 0.65 NG/ML (ref 0–4)
PROTEIN, POC: NORMAL
SPECIFIC GRAVITY, POC: >=1.03
UROBILINOGEN, POC: NORMAL

## 2022-12-30 PROCEDURE — 71046 X-RAY EXAM CHEST 2 VIEWS: CPT

## 2022-12-30 RX ORDER — AMOXICILLIN AND CLAVULANATE POTASSIUM 875; 125 MG/1; MG/1
1 TABLET, FILM COATED ORAL EVERY 12 HOURS
Qty: 10 TABLET | Refills: 0 | Status: SHIPPED | OUTPATIENT
Start: 2022-12-30 | End: 2023-01-04

## 2022-12-30 RX ORDER — DOXYCYCLINE HYCLATE 100 MG/1
100 CAPSULE ORAL 2 TIMES DAILY
COMMUNITY

## 2022-12-30 RX ORDER — TAMSULOSIN HYDROCHLORIDE 0.4 MG/1
0.4 CAPSULE ORAL DAILY
Qty: 30 CAPSULE | Refills: 0 | Status: SHIPPED | OUTPATIENT
Start: 2022-12-30

## 2022-12-30 ASSESSMENT — ENCOUNTER SYMPTOMS
SHORTNESS OF BREATH: 0
VOMITING: 0
ABDOMINAL DISTENTION: 0
ABDOMINAL PAIN: 0
DIARRHEA: 0
ALLERGIC/IMMUNOLOGIC NEGATIVE: 1
SINUS PRESSURE: 0
EYE PAIN: 0
NAUSEA: 1
BLOOD IN STOOL: 0
SINUS PAIN: 0
COUGH: 1
SORE THROAT: 0

## 2022-12-30 NOTE — PATIENT INSTRUCTIONS
CXR- will call patient with results  Poct UA completed-normal.  PSA collected in clinic. Flomax sent to pharmacy- take as directed. Complete full course of antibiotics as directed. If you have any chest pain, difficulty breathing, syncope, visual changes, high fever, unable to hold down fluids, unable to urinate or any other concerning symptoms please present to the nearest ED. Please have close follow up with PCP. Patient verbalizes understanding and agrees with treatment plan.

## 2022-12-30 NOTE — PROGRESS NOTES
[phone]  [address] [address] [place] [address]  Dept: [phone]  Dept Fax: [phone]  Loc: [phone]    Eduardo Tao is a 65 y.o. male who presents today for his medical conditions/complaints as noted below. Eduardo Tao is complaining of Cough (Pt reports that symptoms started two weeks ago.), Pharyngitis, and Fatigue    HPI:   Patient presents with cough that is persistent, fatigue, mild nausea, decreased appetite and difficulty urinating. Patient states he has had a cough for about 2 weeks. Says he is progressively more fatigued. Patient states that he did have carpal tunnel release surgery a few days after his coughing started. Patient also has sleep apnea and few weeks ago was cleaning out his grain bin states he was wearing a respirator. Patient denies fever, flank pain, hematuria, chest pain, syncope or difficulty breathing.       Past Medical History:   Diagnosis Date    Backache, unspecified     Artis's esophagus     Bleeds easily ([address]     Diseases of lips     Esophageal reflux     Glossitis     Hematospermia     Hoarseness     Insomnia, unspecified     Mononeuritis of unspecified site     Neck pain     Neuropathy     nate feet    Other abnormal clinical finding     Other chronic pain     Other malaise and fatigue     Other specified disorders of breast     Sore throat     Unspecified essential hypertension     Urinary tract infection, site not specified        Past Surgical History:   Procedure Laterality Date    APPENDECTOMY      CARPAL TUNNEL RELEASE Right 12/19/2022    RIGHT OPEN CARPAL TUNNEL RELEASE performed by Yumiko Gonsales MD at [address] [place] [address]      Dr Norberto Swartz @ . [address] per patient    COLONOSCOPY  03/07/2014    Dr Nuñez Speaker:  HP, 5y recall    COLONOSCOPY N/A 03/22/2021    Dr Lexi Ackerman yr recall    [facility]     [facility] GASTROINTEST[facility]AL ENDOSCOPY  02/19/2014    Dr Calli Bronson:  Biopsy for Artis's surveillance inadequate for evaluation. 3 year repeat    UPPER GASTROINTESTINAL ENDOSCOPY N/A 08/06/2019    Dr Eleuterio Funez over wire-54 F-Gastropathy, gastric polyps, esophagitis-No recall    UPPER GASTROINTESTINAL ENDOSCOPY  08/06/2019    Dr Eleuterio Funez over wire-54 F-Gastropathy, gastric polyps, esophagitis-No recall    UPPER GASTROINTESTINAL ENDOSCOPY N/A 12/01/2022    Dr OCTAVIO Sherman-w/cpa-[id number]-Zydjub-appearing stricture in the distal esophagus-Esophagitis, no Artis's    UPPER GASTROINTESTINAL ENDOSCOPY N/A 12/01/2022    Dr OCTAVIO Sherman-w/ema-24I-Bgstrz-appearing stricture in the distal esophagus-Esophagitis, no Artis's       Family History   Problem Relation Age of Onset    Diabetes Mother     Diabetes Father     Heart Disease Father     Colon Polyps Maternal Grandmother     Crohn's Disease Daughter     Crohn's Disease Other     Colon Cancer Neg Hx     Liver Cancer Neg Hx     Stomach Cancer Neg Hx     Rectal Cancer Neg Hx     Esophageal Cancer Neg Hx     Liver Disease Neg Hx        Social History     Tobacco Use    Smoking status: Never    Smokeless tobacco: Never   Substance Use Topics    Alcohol use: No        Current Outpatient Medications   Medication Sig Dispense Refill    doxycycline hyclate (VIBRAMYCIN) 100 MG capsule Take 100 mg by mouth 2 times daily      amoxicillin-clavulanate (AUGMENTIN) 875-125 MG per tablet Take 1 tablet by mouth in the morning and 1 tablet in the evening. Do all this for 5 days. 10 tablet 0    tamsulosin (FLOMAX) 0.4 MG capsule Take 1 capsule by mouth daily 30 capsule 0    gabapentin (NEURONTIN) 100 MG capsule Take 1 capsule by mouth daily for 30 days. 30 capsule 3    gabapentin (NEURONTIN) 600 MG tablet TAKE 1 TABLET BY MOUTH 2 TIMES A DAY (Patient taking differently: Take 600 mg by mouth nightly.  TAKE 1 TABLET BY MOUTH 2 TIMES A DAY) 180 tablet 0    omeprazole (PRILOSEC) 40 MG delayed release capsule TAKE ONE CAPSULE TWO TIMES A DAY (Patient taking differently: Take 40 mg by mouth in the morning and at bedtime TAKE ONE CAPSULE TWO TIMES A DAY) 180 capsule 3    hydrOXYzine HCl (ATARAX) 10 MG tablet TAKE ONE TABLET BY MOUTH TWO TIMES A DAY AS NEEDED FOR ITCHING OR ANXIETY (Patient taking differently: Take 10 mg by mouth every 4 hours as needed TAKE ONE TABLET BY MOUTH TWO TIMES A DAY AS NEEDED FOR ITCHING OR ANXIETY) 60 tablet 1    meloxicam (MOBIC) 15 MG tablet TAKE ONE TABLET BY MOUTH ONE TIME DAILY (Patient taking differently: Take 15 mg by mouth daily TAKE ONE TABLET BY MOUTH ONE TIME DAILY) 90 tablet 3    tiZANidine (ZANAFLEX) 4 MG tablet TAKE ONE TABLET BY MOUTH EVERY 6 HOURS AS NEEDED FOR MUSCLE SPASMS (Patient taking differently: Take 4 mg by mouth every 6 hours as needed TAKE ONE TABLET BY MOUTH EVERY 6 HOURS AS NEEDED FOR MUSCLE SPASMS) 60 tablet 1    montelukast (SINGULAIR) 10 MG tablet TAKE 1 TABLET BY MOUTH ONE TIME A DAY (Patient taking differently: Take 10 mg by mouth nightly TAKE 1 TABLET BY MOUTH ONE TIME A DAY) 90 tablet 3    lisinopril (PRINIVIL;ZESTRIL) 20 MG tablet TAKE ONE TABLET ONE TIME DAILY (Patient taking differently: Take 20 mg by mouth daily TAKE ONE TABLET ONE TIME DAILY) 90 tablet 1    Fexofenadine HCl (ALLEGRA PO) Take 1 tablet by mouth daily       No current facility-administered medications for this visit.        Allergies   Allergen Reactions    Iodine Rash     Skin peels, feet and hands turn red       Health Maintenance   Topic Date Due    COVID-19 Vaccine (1) Never done    DTaP/Tdap/Td vaccine (2 - Td or Tdap) 07/16/2022    Flu vaccine (1) 08/01/2022    Depression Screen  03/29/2023    Colorectal Cancer Screen  03/22/2026    Lipids  08/18/2026    Shingles vaccine  Completed    Hepatitis C screen  Addressed    HIV screen  Addressed    Hepatitis A vaccine  Aged Out    Hib vaccine  Aged Out    Meningococcal (ACWY) vaccine  Aged Out    Pneumococcal 0-64 years Vaccine Aged Out       Subjective:   Review of Systems   Constitutional:  Positive for appetite change and fatigue. Negative for chills and fever. Eating smaller portions- not very hungry   HENT:  Positive for postnasal drip. Negative for congestion, sinus pressure, sinus pain and sore throat. Eyes:  Negative for pain and visual disturbance. Respiratory:  Positive for cough. Negative for shortness of breath. Cardiovascular:  Negative for chest pain. Gastrointestinal:  Positive for nausea. Negative for abdominal distention, abdominal pain, blood in stool, diarrhea and vomiting. Mild nausea   Endocrine: Negative for cold intolerance and heat intolerance. Genitourinary:  Positive for difficulty urinating. Negative for frequency, hematuria and urgency. Musculoskeletal:  Negative for myalgias. Skin:  Negative for rash. Allergic/Immunologic: Negative. Neurological:  Negative for syncope, weakness, light-headedness and headaches. Hematological: Negative. Psychiatric/Behavioral: Negative. Objective    Physical Exam  Vitals and nursing note reviewed. Constitutional:       General: He is not in acute distress. Appearance: Normal appearance. Comments: Fatigued appearing   HENT:      Head: Normocephalic and atraumatic. Right Ear: Tympanic membrane, ear canal and external ear normal.      Left Ear: Tympanic membrane, ear canal and external ear normal.      Nose: Nose normal. No congestion or rhinorrhea. Mouth/Throat:      Mouth: Mucous membranes are moist.      Pharynx: Oropharynx is clear. No oropharyngeal exudate or posterior oropharyngeal erythema. Eyes:      Extraocular Movements: Extraocular movements intact. Conjunctiva/sclera: Conjunctivae normal.   Cardiovascular:      Rate and Rhythm: Normal rate and regular rhythm. Pulses: Normal pulses. Heart sounds: Normal heart sounds.    Pulmonary:      Effort: Pulmonary effort is normal. No respiratory distress. Breath sounds: Normal breath sounds. No stridor. No wheezing, rhonchi or rales. Chest:      Chest wall: No tenderness. Abdominal:      General: Abdomen is flat. Bowel sounds are normal. There is no distension. Palpations: Abdomen is soft. Tenderness: There is no abdominal tenderness. There is no right CVA tenderness or left CVA tenderness. Musculoskeletal:         General: Normal range of motion. Cervical back: Normal range of motion and neck supple. No tenderness. Skin:     General: Skin is warm and dry. Findings: No erythema. Comments: Well healing incision to right palm s/p carpal tunnel release    Neurological:      General: No focal deficit present. Mental Status: He is alert and oriented to person, place, and time. Psychiatric:         Mood and Affect: Mood normal.         Behavior: Behavior normal.       /62   Pulse 72   Temp 98.2 °F (36.8 °C)   Ht 5' 9\" (1.753 m)   Wt 214 lb (97.1 kg)   SpO2 98%   BMI 31.60 kg/m²     Assessment       Diagnosis Orders   1. Subacute cough  XR CHEST STANDARD (2 VW)    amoxicillin-clavulanate (AUGMENTIN) 875-125 MG per tablet      2. Urinary retention  PSA Screening    POCT Urinalysis no Micro    tamsulosin (FLOMAX) 0.4 MG capsule          Plan   CXR- will call patient with results  Poct UA completed-normal.  PSA collected in clinic. Flomax sent to pharmacy- take as directed. Complete full course of antibiotics as directed. If you have any chest pain, difficulty breathing, syncope, visual changes, high fever, unable to hold down fluids, unable to urinate or any other concerning symptoms please present to the nearest ED. Please have close follow up with PCP. Patient verbalizes understanding and agrees with treatment plan.       Orders Placed This Encounter   Procedures    XR CHEST STANDARD (2 VW)     Standing Status:   Future     Number of Occurrences:   1     Standing Expiration Date:   12/30/2023    PSA Screening     Standing Status:   Future     Standing Expiration Date:   12/30/2023    POCT Urinalysis no Micro       Results for orders placed or performed in visit on 12/30/22   POCT Urinalysis no Micro   Result Value Ref Range    Color, UA yellow     Clarity, UA clear     Glucose, UA POC neg     Bilirubin, UA neg     Ketones, UA neg     Spec Grav, UA >=1.030     Blood, UA POC neg     pH, UA 5.5     Protein, UA POC neg     Urobilinogen, UA 0.2. E. U/dl     Leukocytes, UA neg     Nitrite, UA neg     Appearance, Fluid Clear Clear, Slightly Cloudy       Orders Placed This Encounter   Medications    amoxicillin-clavulanate (AUGMENTIN) 875-125 MG per tablet     Sig: Take 1 tablet by mouth in the morning and 1 tablet in the evening. Do all this for 5 days. Dispense:  10 tablet     Refill:  0    tamsulosin (FLOMAX) 0.4 MG capsule     Sig: Take 1 capsule by mouth daily     Dispense:  30 capsule     Refill:  0        New Prescriptions    AMOXICILLIN-CLAVULANATE (AUGMENTIN) 875-125 MG PER TABLET    Take 1 tablet by mouth in the morning and 1 tablet in the evening. Do all this for 5 days. TAMSULOSIN (FLOMAX) 0.4 MG CAPSULE    Take 1 capsule by mouth daily        Return if symptoms worsen or fail to improve. Discussed use, benefits, and side effects of any prescribed medications. All patient questions were answered. Patient voiced understanding of care plan. Patient was given educational materials - see patient instructions below. Patient Instructions   CXR- will call patient with results  Poct UA completed-normal.  PSA collected in clinic. Flomax sent to pharmacy- take as directed. Complete full course of antibiotics as directed. If you have any chest pain, difficulty breathing, syncope, visual changes, high fever, unable to hold down fluids, unable to urinate or any other concerning symptoms please present to the nearest ED. Please have close follow up with PCP.   Patient verbalizes understanding and agrees with treatment plan.       Electronically signed by Ludwig Boone PA-C on 12/30/2022 at 10:22 AM

## 2023-01-11 DIAGNOSIS — L50.9 HIVES: ICD-10-CM

## 2023-01-11 RX ORDER — MONTELUKAST SODIUM 10 MG/1
10 TABLET ORAL NIGHTLY
Qty: 30 TABLET | Refills: 3 | Status: SHIPPED | OUTPATIENT
Start: 2023-01-11 | End: 2023-01-12

## 2023-01-11 NOTE — TELEPHONE ENCOUNTER
Vignesh Sánchez called requesting a refill of the below medication which has been pended for you:     Requested Prescriptions     Pending Prescriptions Disp Refills    montelukast (SINGULAIR) 10 MG tablet       Sig: Take 1 tablet by mouth nightly TAKE 1 TABLET BY MOUTH ONE TIME A DAY       Last Appointment Date: 12/16/2022  Next Appointment Date: Visit date not found    Allergies   Allergen Reactions    Iodine Rash     Skin peels, feet and hands turn red

## 2023-01-12 RX ORDER — MONTELUKAST SODIUM 10 MG/1
10 TABLET ORAL DAILY
Qty: 90 TABLET | Refills: 3 | Status: SHIPPED | OUTPATIENT
Start: 2023-01-12

## 2023-03-01 DIAGNOSIS — Z76.0 MEDICATION REFILL: ICD-10-CM

## 2023-03-01 DIAGNOSIS — I10 ESSENTIAL HYPERTENSION: ICD-10-CM

## 2023-03-01 RX ORDER — LISINOPRIL 20 MG/1
20 TABLET ORAL DAILY
Qty: 30 TABLET | Refills: 5 | Status: SHIPPED | OUTPATIENT
Start: 2023-03-01

## 2023-03-01 RX ORDER — OMEPRAZOLE 40 MG/1
40 CAPSULE, DELAYED RELEASE ORAL 2 TIMES DAILY
Qty: 30 CAPSULE | Refills: 5 | Status: SHIPPED | OUTPATIENT
Start: 2023-03-01

## 2023-03-01 NOTE — TELEPHONE ENCOUNTER
Reese Donis called requesting a refill of the below medication which has been pended for you:     Requested Prescriptions     Pending Prescriptions Disp Refills    omeprazole (PRILOSEC) 40 MG delayed release capsule       Sig: Take 1 capsule by mouth in the morning and at bedtime TAKE ONE CAPSULE TWO TIMES A DAY    lisinopril (PRINIVIL;ZESTRIL) 20 MG tablet       Sig: Take 1 tablet by mouth daily TAKE ONE TABLET ONE TIME DAILY       Last Appointment Date: 12/16/2022  Next Appointment Date: Visit date not found    Allergies   Allergen Reactions    Iodine Rash     Skin peels, feet and hands turn red

## 2023-03-16 DIAGNOSIS — M19.90 ARTHRITIS: ICD-10-CM

## 2023-03-16 NOTE — TELEPHONE ENCOUNTER
Received fax from pharmacy requesting refill on pts medication(s). Pt was last seen in office on 12/16/2022  and has a follow up scheduled for Visit date not found. Will send request to  Johanna Joseph  for authorization.      Requested Prescriptions     Pending Prescriptions Disp Refills    meloxicam (MOBIC) 15 MG tablet 90 tablet 3     Sig: TAKE ONE TABLET BY MOUTH ONE TIME DAILY

## 2023-03-17 RX ORDER — MELOXICAM 15 MG/1
TABLET ORAL
Qty: 90 TABLET | Refills: 3 | Status: SHIPPED | OUTPATIENT
Start: 2023-03-17

## 2023-06-12 DIAGNOSIS — M19.90 ARTHRITIS: ICD-10-CM

## 2023-06-12 RX ORDER — MELOXICAM 15 MG/1
TABLET ORAL
Qty: 90 TABLET | Refills: 3 | OUTPATIENT
Start: 2023-06-12

## 2023-07-28 DIAGNOSIS — M54.16 LUMBAR RADICULAR PAIN: ICD-10-CM

## 2023-07-28 DIAGNOSIS — M25.561 CHRONIC PAIN OF RIGHT KNEE: ICD-10-CM

## 2023-07-28 DIAGNOSIS — G89.29 CHRONIC PAIN OF RIGHT KNEE: ICD-10-CM

## 2023-07-28 RX ORDER — TIZANIDINE 4 MG/1
TABLET ORAL
Qty: 60 TABLET | Refills: 1 | Status: SHIPPED | OUTPATIENT
Start: 2023-07-28

## 2023-07-28 NOTE — TELEPHONE ENCOUNTER
Received fax from pharmacy requesting refill on pts medication(s). Pt was last seen in office on 12/16/2022  and has a follow up scheduled for Visit date not found. Will send request to  Chevy Bush  for patient.      Requested Prescriptions     Pending Prescriptions Disp Refills    tiZANidine (ZANAFLEX) 4 MG tablet [Pharmacy Med Name: tiZANidine HCl 4 MG Tablet] 60 tablet 1     Sig: TAKE ONE TABLET BY MOUTH EVERY 6 HOURS AS NEEDED FOR MUSCLE SPASMS

## 2023-08-17 DIAGNOSIS — I10 ESSENTIAL HYPERTENSION: ICD-10-CM

## 2023-08-17 RX ORDER — LISINOPRIL 20 MG/1
TABLET ORAL
Qty: 90 TABLET | Refills: 2 | OUTPATIENT
Start: 2023-08-17

## 2023-08-23 ENCOUNTER — OFFICE VISIT (OUTPATIENT)
Dept: FAMILY MEDICINE CLINIC | Age: 63
End: 2023-08-23

## 2023-08-23 VITALS
OXYGEN SATURATION: 97 % | SYSTOLIC BLOOD PRESSURE: 138 MMHG | TEMPERATURE: 98 F | WEIGHT: 210 LBS | BODY MASS INDEX: 31.01 KG/M2 | DIASTOLIC BLOOD PRESSURE: 84 MMHG | HEART RATE: 83 BPM

## 2023-08-23 DIAGNOSIS — I10 ESSENTIAL HYPERTENSION: Primary | ICD-10-CM

## 2023-08-23 DIAGNOSIS — M54.16 LUMBAR RADICULAR PAIN: ICD-10-CM

## 2023-08-23 DIAGNOSIS — M94.0 COSTOCHONDRITIS: ICD-10-CM

## 2023-08-23 DIAGNOSIS — M25.561 CHRONIC PAIN OF RIGHT KNEE: ICD-10-CM

## 2023-08-23 DIAGNOSIS — G89.29 CHRONIC PAIN OF RIGHT KNEE: ICD-10-CM

## 2023-08-23 PROCEDURE — 99214 OFFICE O/P EST MOD 30 MIN: CPT | Performed by: NURSE PRACTITIONER

## 2023-08-23 PROCEDURE — 3074F SYST BP LT 130 MM HG: CPT | Performed by: NURSE PRACTITIONER

## 2023-08-23 PROCEDURE — 3078F DIAST BP <80 MM HG: CPT | Performed by: NURSE PRACTITIONER

## 2023-08-23 PROCEDURE — 96372 THER/PROPH/DIAG INJ SC/IM: CPT | Performed by: NURSE PRACTITIONER

## 2023-08-23 RX ORDER — DEXAMETHASONE SODIUM PHOSPHATE 10 MG/ML
5 INJECTION INTRAMUSCULAR; INTRAVENOUS ONCE
Status: DISCONTINUED | OUTPATIENT
Start: 2023-08-23 | End: 2023-08-24

## 2023-08-23 RX ORDER — PREDNISONE 10 MG/1
10 TABLET ORAL DAILY
Qty: 7 TABLET | Refills: 0 | Status: SHIPPED | OUTPATIENT
Start: 2023-08-23 | End: 2023-08-30

## 2023-08-23 RX ORDER — GABAPENTIN 100 MG/1
100 CAPSULE ORAL 2 TIMES DAILY
Qty: 180 CAPSULE | Refills: 1 | Status: SHIPPED | OUTPATIENT
Start: 2023-08-23 | End: 2023-09-22

## 2023-08-23 RX ORDER — GABAPENTIN 600 MG/1
600 TABLET ORAL 2 TIMES DAILY
Qty: 180 TABLET | Refills: 1 | Status: SHIPPED | OUTPATIENT
Start: 2023-08-23 | End: 2024-02-19

## 2023-08-23 RX ORDER — LISINOPRIL 20 MG/1
20 TABLET ORAL DAILY
Qty: 90 TABLET | Refills: 3 | Status: SHIPPED | OUTPATIENT
Start: 2023-08-23

## 2023-08-23 RX ORDER — TESTOSTERONE CYPIONATE 200 MG/ML
5 INJECTION INTRAMUSCULAR ONCE
Status: COMPLETED | OUTPATIENT
Start: 2023-08-23 | End: 2023-08-23

## 2023-08-23 RX ORDER — TIZANIDINE 4 MG/1
4 TABLET ORAL EVERY 8 HOURS PRN
Qty: 90 TABLET | Refills: 1 | Status: SHIPPED | OUTPATIENT
Start: 2023-08-23

## 2023-08-23 RX ADMIN — TESTOSTERONE CYPIONATE 5 MG: 200 INJECTION INTRAMUSCULAR at 15:21

## 2023-08-23 ASSESSMENT — PATIENT HEALTH QUESTIONNAIRE - PHQ9
1. LITTLE INTEREST OR PLEASURE IN DOING THINGS: 0
SUM OF ALL RESPONSES TO PHQ QUESTIONS 1-9: 0
SUM OF ALL RESPONSES TO PHQ9 QUESTIONS 1 & 2: 0
2. FEELING DOWN, DEPRESSED OR HOPELESS: 0
SUM OF ALL RESPONSES TO PHQ QUESTIONS 1-9: 0

## 2023-08-23 NOTE — PROGRESS NOTES
[facility]YSICIAN [facility]  [facility]  [address]  [address] [address]  Dept: [phone]  Dept Fax: [phone]  Loc: [phone]    Kaye Lopez is a 59 y.o. male who presents today for his medical conditions/complaints as noted below. Kaye Lopez is c/o of Medication Refill (Gabapentin and lisinopril refill.) and Other (Chest discomfort, when coughing or breathing deeply. has had crack rib in past and it feels the same as it did before )      Chief Complaint   Patient presents with    Medication Refill     Gabapentin and lisinopril refill. Other     Chest discomfort, when coughing or breathing deeply. has had crack rib in past and it feels the same as it did before        HPI:     HPI  Patient here for follow up on chronic conditions including carpal tunnel and HTN. Patient does report having episode of some chest discomfort when coughing or taking a deep breath. He was lifting and moving equipment over the weekend and this developed the next day. Denies any shortness of breath or pain or discomfort when resting.     Past Medical History:   Diagnosis Date    Backache, unspecified     Artis's esophagus     Bleeds easily ([address])     Diseases of lips     Esophageal reflux     Glossitis     Hematospermia     Hoarseness     Insomnia, unspecified     Mononeuritis of unspecified site     Neck pain     Neuropathy     nate feet    Other abnormal clinical finding     Other chronic pain     Other malaise and fatigue     Other specified disorders of breast     Sore throat     Unspecified essential hypertension     Urinary tract infection, site not specified         Past Surgical History:   Procedure Laterality Date    APPENDECTOMY      CARPAL TUNNEL RELEASE Right 12/19/2022    RIGHT OPEN CARPAL TUNNEL RELEASE performed by Tianna Valdovinos MD at [address] [facility]      Dr Trinidad Hwang @ [facility] [address] per patient    COLONOSCOPY  03/07/2014    Dr Dodge Yuri:  HP, 5y recall

## 2023-08-24 ENCOUNTER — PATIENT MESSAGE (OUTPATIENT)
Dept: FAMILY MEDICINE CLINIC | Age: 63
End: 2023-08-24

## 2023-08-24 ASSESSMENT — ENCOUNTER SYMPTOMS
EYES NEGATIVE: 1
GASTROINTESTINAL NEGATIVE: 1
RESPIRATORY NEGATIVE: 1

## 2023-08-25 NOTE — TELEPHONE ENCOUNTER
From: Trev Fernandes  To: Chevy Bush  Sent: 8/24/2023 10:09 PM CDT  Subject: rib problem    forgot to ask . . will it hurt anything if i play golf with some pain? Or do i have to wait till i am pain free ? thank you!

## 2024-02-21 DIAGNOSIS — Z76.0 MEDICATION REFILL: ICD-10-CM

## 2024-02-22 RX ORDER — OMEPRAZOLE 40 MG/1
40 CAPSULE, DELAYED RELEASE ORAL 2 TIMES DAILY
Qty: 180 CAPSULE | Refills: 2 | OUTPATIENT
Start: 2024-02-22

## 2024-02-23 DIAGNOSIS — Z76.0 MEDICATION REFILL: ICD-10-CM

## 2024-02-23 RX ORDER — OMEPRAZOLE 40 MG/1
CAPSULE, DELAYED RELEASE ORAL
Qty: 60 CAPSULE | Refills: 5 | Status: SHIPPED | OUTPATIENT
Start: 2024-02-23

## 2024-02-23 NOTE — TELEPHONE ENCOUNTER
Pt called for a refill    Received call/My Chart Message from patient requesting refill on medication(s). Pt was last seen in office on 8/23/2023  and has a follow up scheduled for Visit date not found. Will send request to provider for authorization.     Requested Prescriptions     Pending Prescriptions Disp Refills    omeprazole (PRILOSEC) 40 MG delayed release capsule 60 capsule 5     Sig: TAKE ONE CAPSULE TWO TIMES A DAY

## 2024-03-08 ENCOUNTER — HOSPITAL ENCOUNTER (OUTPATIENT)
Dept: MRI IMAGING | Age: 64
Discharge: HOME | End: 2024-03-08
Payer: COMMERCIAL

## 2024-03-08 DIAGNOSIS — M25.562 LEFT KNEE PAIN, UNSPECIFIED CHRONICITY: ICD-10-CM

## 2024-03-08 PROCEDURE — 73721 MRI JNT OF LWR EXTRE W/O DYE: CPT

## 2024-03-27 ENCOUNTER — NURSE ONLY (OUTPATIENT)
Dept: FAMILY MEDICINE CLINIC | Age: 64
End: 2024-03-27

## 2024-03-27 DIAGNOSIS — M19.90 ARTHRITIS: ICD-10-CM

## 2024-03-27 DIAGNOSIS — Z13.9 SCREENING DUE: Primary | ICD-10-CM

## 2024-03-27 RX ORDER — MELOXICAM 15 MG/1
TABLET ORAL
Qty: 90 TABLET | Refills: 3 | Status: SHIPPED | OUTPATIENT
Start: 2024-03-27

## 2024-03-27 SDOH — ECONOMIC STABILITY: HOUSING INSECURITY
IN THE LAST 12 MONTHS, WAS THERE A TIME WHEN YOU DID NOT HAVE A STEADY PLACE TO SLEEP OR SLEPT IN A SHELTER (INCLUDING NOW)?: NO

## 2024-03-27 SDOH — ECONOMIC STABILITY: INCOME INSECURITY: HOW HARD IS IT FOR YOU TO PAY FOR THE VERY BASICS LIKE FOOD, HOUSING, MEDICAL CARE, AND HEATING?: PATIENT DECLINED

## 2024-03-27 SDOH — ECONOMIC STABILITY: FOOD INSECURITY: WITHIN THE PAST 12 MONTHS, THE FOOD YOU BOUGHT JUST DIDN'T LAST AND YOU DIDN'T HAVE MONEY TO GET MORE.: PATIENT DECLINED

## 2024-03-27 SDOH — ECONOMIC STABILITY: FOOD INSECURITY: WITHIN THE PAST 12 MONTHS, YOU WORRIED THAT YOUR FOOD WOULD RUN OUT BEFORE YOU GOT MONEY TO BUY MORE.: PATIENT DECLINED

## 2024-03-27 NOTE — PROGRESS NOTES
Question 3/25/2024 11:21 PM CDT - Filed by Patient   How hard is it for you to pay for the very basics like food, housing, medical care, and heating? Patient declined   Within the past 12 months, you worried that your food would run out before you got the money to buy more. Patient declined   Within the past 12 months, the food you bought just didn’t last and you didn’t have money to get more. Patient declined   In the past 12 months, has lack of transportation kept you from meetings, work, or from getting things needed for daily living? No   In the last 12 months, was there a time when you did not have a steady place to sleep or slept in a shelter (including now)? No   Would you like resources for any of these topics? No, thank you

## 2024-03-27 NOTE — TELEPHONE ENCOUNTER
Received fax from pharmacy requesting refill on pts medication(s). Pt was last seen in office on 8/23/2023  and has a follow up scheduled for Visit date not found. Will send request to  Brit Deluca  for authorization.     Requested Prescriptions     Pending Prescriptions Disp Refills    meloxicam (MOBIC) 15 MG tablet 90 tablet 3     Sig: TAKE ONE TABLET BY MOUTH ONE TIME DAILY

## 2024-05-10 DIAGNOSIS — L50.9 HIVES: ICD-10-CM

## 2024-05-10 RX ORDER — HYDROXYZINE HYDROCHLORIDE 10 MG/1
TABLET, FILM COATED ORAL
Qty: 180 TABLET | Refills: 1 | OUTPATIENT
Start: 2024-05-10

## 2024-12-10 ENCOUNTER — OFFICE VISIT (OUTPATIENT)
Age: 64
End: 2024-12-10
Payer: COMMERCIAL

## 2024-12-10 VITALS — HEIGHT: 69 IN | BODY MASS INDEX: 31.7 KG/M2 | WEIGHT: 214 LBS

## 2024-12-10 DIAGNOSIS — S83.232A COMPLEX TEAR OF MEDIAL MENISCUS OF LEFT KNEE AS CURRENT INJURY, INITIAL ENCOUNTER: Primary | ICD-10-CM

## 2024-12-10 PROCEDURE — 99214 OFFICE O/P EST MOD 30 MIN: CPT | Performed by: ORTHOPAEDIC SURGERY
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
## 2024-12-11 ENCOUNTER — TELEPHONE (OUTPATIENT)
Age: 64
End: 2024-12-11

## 2024-12-11 NOTE — TELEPHONE ENCOUNTER
Patient requested surgery estimate. Provided self pay estimate for code 88035 and 19753. Provided the [employer] phone number for hospital charge estimates.

## 2025-01-13 DIAGNOSIS — R52 PAIN: Primary | ICD-10-CM

## 2025-01-14 DIAGNOSIS — R52 PAIN: ICD-10-CM

## 2025-01-14 RX ORDER — ONDANSETRON 4 MG/1
4 TABLET, FILM COATED ORAL EVERY 6 HOURS PRN
Qty: 28 TABLET | Refills: 0 | Status: SHIPPED | OUTPATIENT
Start: 2025-01-15 | End: 2025-01-14

## 2025-01-14 RX ORDER — ONDANSETRON 4 MG/1
4 TABLET, FILM COATED ORAL EVERY 6 HOURS PRN
Qty: 28 TABLET | Refills: 0 | Status: SHIPPED | OUTPATIENT
Start: 2025-01-15 | End: 2025-01-15 | Stop reason: SDUPTHER

## 2025-01-14 RX ORDER — HYDROCODONE BITARTRATE AND ACETAMINOPHEN 10; 325 MG/1; MG/1
1 TABLET ORAL
Qty: 42 TABLET | Refills: 0 | Status: CANCELLED | OUTPATIENT
Start: 2025-01-15 | End: 2025-01-22

## 2025-01-14 RX ORDER — HYDROCODONE BITARTRATE AND ACETAMINOPHEN 10; 325 MG/1; MG/1
1 TABLET ORAL
Qty: 42 TABLET | Refills: 0 | Status: SHIPPED | OUTPATIENT
Start: 2025-01-15 | End: 2025-01-15 | Stop reason: SDUPTHER

## 2025-01-14 RX ORDER — ONDANSETRON 4 MG/1
4 TABLET, FILM COATED ORAL EVERY 6 HOURS PRN
Qty: 28 TABLET | Refills: 0 | Status: CANCELLED | OUTPATIENT
Start: 2025-01-15

## 2025-01-14 RX ORDER — HYDROCODONE BITARTRATE AND ACETAMINOPHEN 10; 325 MG/1; MG/1
1 TABLET ORAL
Qty: 42 TABLET | Refills: 0 | Status: SHIPPED | OUTPATIENT
Start: 2025-01-15 | End: 2025-01-14 | Stop reason: CLARIF

## 2025-01-15 DIAGNOSIS — R52 PAIN: ICD-10-CM

## 2025-01-15 RX ORDER — HYDROCODONE BITARTRATE AND ACETAMINOPHEN 10; 325 MG/1; MG/1
1 TABLET ORAL
Qty: 42 TABLET | Refills: 0 | Status: SHIPPED | OUTPATIENT
Start: 2025-01-15 | End: 2025-01-22

## 2025-01-15 RX ORDER — ONDANSETRON 4 MG/1
4 TABLET, FILM COATED ORAL EVERY 6 HOURS PRN
Qty: 28 TABLET | Refills: 0 | Status: SHIPPED | OUTPATIENT
Start: 2025-01-15

## 2025-01-28 ENCOUNTER — OFFICE VISIT (OUTPATIENT)
Age: 65
End: 2025-01-28

## 2025-01-28 DIAGNOSIS — Z98.890 S/P LEFT KNEE ARTHROSCOPY: Primary | ICD-10-CM

## 2025-01-28 PROCEDURE — 99024 POSTOP FOLLOW-UP VISIT: CPT | Performed by: ORTHOPAEDIC SURGERY

## 2025-01-28 RX ORDER — SULFAMETHOXAZOLE AND TRIMETHOPRIM 800; 160 MG/1; MG/1
1 TABLET ORAL 2 TIMES DAILY
Qty: 10 TABLET | Refills: 0 | Status: SHIPPED | OUTPATIENT
Start: 2025-01-28 | End: 2025-02-02

## 2025-01-28 NOTE — PROGRESS NOTES
Orthopaedic Clinic Note - Established Patient    NAME:  Yovani Bird   : 1960  MRN: [id number]      2025      CHIEF COMPLAINT: had concerns including Post-Op Check.      HISTORY OF PRESENT ILLNESS:    64-year-old male status post a left knee arthroscopic partial medial meniscectomy on 1/15/2025.  He is doing well without complaints today.       Past Medical History:        Diagnosis Date    Backache, unspecified     Artis's esophagus     Bleeds easily (HCC)     Bursitis     Diseases of lips     Esophageal reflux     Glossitis     Hematospermia     Hoarseness     Insomnia, unspecified     Mononeuritis of unspecified site     Neck pain     Neuropathy     nate feet    Other abnormal clinical finding     Other chronic pain     Other malaise and fatigue     Other specified disorders of breast     Sore throat     Spondylolisthesis     Unspecified essential hypertension     Urinary tract infection, site not specified        Past Surgical History:        Procedure Laterality Date    APPENDECTOMY      CARPAL TUNNEL RELEASE Right 2022    RIGHT OPEN CARPAL TUNNEL RELEASE performed by Hung Coreas MD at [facility] OR    CHOLECYSTECTOMY      COLONOSCOPY      Dr Fuentes @ [facility]-normal per patient    COLONOSCOPY  2014    Dr Strauss:  HP, 5y recall    COLONOSCOPY N/A 2021    Dr OCTAVIO Sherman-5 yr recall    UPPER GASTROINTESTINAL ENDOSCOPY      Dr Fuentes @ [facility]-Artis's     UPPER GASTROINTESTINAL ENDOSCOPY  2014    Dr Strauss:  Biopsy for Artis's surveillance inadequate for evaluation. 3 year repeat    UPPER GASTROINTESTINAL ENDOSCOPY N/A 2019    Dr Latham-w/dilation over wire-54 F-Gastropathy, gastric polyps, esophagitis-No recall    UPPER GASTROINTESTINAL ENDOSCOPY  2019    Dr Latham-w/dilation over wire-54 F-Gastropathy, gastric polyps, esophagitis-No recall    UPPER GASTROINTESTINAL ENDOSCOPY N/A 2022    Dr OCTAVIO Sherman-w/[id number]-appearing stricture in the distal

## 2025-04-03 ENCOUNTER — TELEPHONE (OUTPATIENT)
Age: 65
End: 2025-04-03

## 2025-04-03 NOTE — TELEPHONE ENCOUNTER
Patient got past due notice. We do not PAR with his insurance and he was given a self pay estimate before surgery. [facility] and I have noted billing review X2 for the self pay adjustment. Email to  for assistance.

## 2025-07-21 DIAGNOSIS — M54.16 LUMBAR RADICULAR PAIN: ICD-10-CM

## 2025-07-22 RX ORDER — GABAPENTIN 100 MG/1
CAPSULE ORAL
Qty: 180 CAPSULE | Refills: 0 | OUTPATIENT
Start: 2025-07-22

## (undated) DEVICE — DRESSING,GAUZE,XEROFORM,CURAD,5"X9",ST: Brand: CURAD

## (undated) DEVICE — AMBU AURA-I U SIZE 4, DISPOSABLE LARYNGEAL MASK: Brand: AURA-I

## (undated) DEVICE — CODMAN® BIPOLAR CORD DISPOSABLE: Brand: CODMAN®

## (undated) DEVICE — SOLUTION IV IRRIG POUR BRL 0.9% SODIUM CHL 2F7124

## (undated) DEVICE — ZIMMER® STERILE DISPOSABLE TOURNIQUET CUFF WITH PLC, DUAL PORT, SINGLE BLADDER, 18 IN. (46 CM)

## (undated) DEVICE — Device

## (undated) DEVICE — GLOVE SURG SZ 8 L12IN FNGR THK79MIL GRN LTX FREE

## (undated) DEVICE — ENDO KIT: Brand: MEDLINE INDUSTRIES, INC.

## (undated) DEVICE — GLOVE SURG SZ 75 L12IN FNGR THK94MIL TRNSLUC YEL LTX

## (undated) DEVICE — ENDO KIT,LOURDES HOSPITAL: Brand: MEDLINE INDUSTRIES, INC.

## (undated) DEVICE — GAUZE,SPONGE,FLUFF,6"X6.75",STRL,10/TRAY: Brand: MEDLINE

## (undated) DEVICE — BITE BLOCK ENDOSCP AD 60 FR W/ ADJ STRP PLAS GRN BLOX

## (undated) DEVICE — BANDAGE COMPR W6INXL12FT SMOOTH FOR LIMB EXSANG ESMARCH

## (undated) DEVICE — DRAPE,U/ SHT,SPLIT,PLAS,STERIL: Brand: MEDLINE

## (undated) DEVICE — FORCEPS BX 240CM 2.4MM L NDL RAD JAW 4 M00513334

## (undated) DEVICE — SINGLE PORT MANIFOLD: Brand: NEPTUNE 2

## (undated) DEVICE — BANDAGE COMPR W4INXL15FT BGE E SGL LAYERED CLP CLSR

## (undated) DEVICE — STANDARD HYPODERMIC NEEDLE,POLYPROPYLENE HUB: Brand: MONOJECT

## (undated) DEVICE — MINOR CDS: Brand: MEDLINE INDUSTRIES, INC.

## (undated) DEVICE — BLADE SURG NO15 C STL DISPOSABLE ST

## (undated) DEVICE — FORCEPS BX L240CM DIA2.4MM L NDL RAD JAW 4 133340

## (undated) DEVICE — SUTURE ETHLN SZ 4-0 L18IN NONABSORBABLE BLK L19MM PS-2 3/8 1667H

## (undated) DEVICE — PADDING UNDERCAST W4INXL4YD 100% COT CRIMPED FINISH WBRL II

## (undated) DEVICE — SHEET,DRAPE,53X77,STERILE: Brand: MEDLINE

## (undated) DEVICE — DRAPE,EXTREMITY,89X128,STERILE: Brand: MEDLINE

## (undated) DEVICE — CANNULA NSL AD L7FT DIV O2 CO2 W/ M LUERLOCK TRMPT CONN